# Patient Record
Sex: FEMALE | Race: WHITE | Employment: OTHER | URBAN - METROPOLITAN AREA
[De-identification: names, ages, dates, MRNs, and addresses within clinical notes are randomized per-mention and may not be internally consistent; named-entity substitution may affect disease eponyms.]

---

## 2017-03-08 ENCOUNTER — HOSPITAL ENCOUNTER (OUTPATIENT)
Age: 82
Setting detail: SPECIMEN
Discharge: HOME | End: 2017-03-08
Payer: MEDICARE

## 2017-03-08 LAB
ABSOLUTE EOS #: 0.1 K/UL (ref 0–0.4)
ABSOLUTE LYMPH #: 1.3 K/UL (ref 1–4.8)
ABSOLUTE MONO #: 0.5 K/UL (ref 0.1–1.2)
ANION GAP SERPL CALCULATED.3IONS-SCNC: 14 MMOL/L (ref 9–17)
BASOPHILS # BLD: 1 % (ref 0–2)
BASOPHILS ABSOLUTE: 0 K/UL (ref 0–0.2)
BUN BLDV-MCNC: 18 MG/DL (ref 8–23)
BUN/CREAT BLD: ABNORMAL (ref 9–20)
CALCIUM IONIZED: 1.38 MMOL/L (ref 1.13–1.33)
CALCIUM SERPL-MCNC: 10.2 MG/DL (ref 8.6–10.4)
CHLORIDE BLD-SCNC: 101 MMOL/L (ref 98–107)
CO2: 28 MMOL/L (ref 20–31)
CREAT SERPL-MCNC: 0.96 MG/DL (ref 0.5–0.9)
DIFFERENTIAL TYPE: ABNORMAL
EOSINOPHILS RELATIVE PERCENT: 4 % (ref 1–4)
GFR AFRICAN AMERICAN: >60 ML/MIN
GFR NON-AFRICAN AMERICAN: 55 ML/MIN
GFR SERPL CREATININE-BSD FRML MDRD: ABNORMAL ML/MIN/{1.73_M2}
GFR SERPL CREATININE-BSD FRML MDRD: ABNORMAL ML/MIN/{1.73_M2}
GLUCOSE BLD-MCNC: 90 MG/DL (ref 70–99)
HCT VFR BLD CALC: 40 % (ref 36–46)
HEMOGLOBIN: 13.4 G/DL (ref 12–16)
LYMPHOCYTES # BLD: 30 % (ref 24–44)
MAGNESIUM: 2.2 MG/DL (ref 1.6–2.6)
MCH RBC QN AUTO: 30 PG (ref 26–34)
MCHC RBC AUTO-ENTMCNC: 33.6 G/DL (ref 31–37)
MCV RBC AUTO: 89.4 FL (ref 80–100)
MONOCYTES # BLD: 12 % (ref 2–11)
PDW BLD-RTO: 14.4 % (ref 12.5–15.4)
PHOSPHORUS: 4.4 MG/DL (ref 2.6–4.5)
PLATELET # BLD: 193 K/UL (ref 140–450)
PLATELET ESTIMATE: ABNORMAL
PMV BLD AUTO: 9.5 FL (ref 6–12)
POTASSIUM SERPL-SCNC: 4.3 MMOL/L (ref 3.7–5.3)
RBC # BLD: 4.48 M/UL (ref 4–5.2)
RBC # BLD: ABNORMAL 10*6/UL
SEG NEUTROPHILS: 53 % (ref 36–66)
SEGMENTED NEUTROPHILS ABSOLUTE COUNT: 2.3 K/UL (ref 1.8–7.7)
SODIUM BLD-SCNC: 143 MMOL/L (ref 135–144)
T3 FREE: 2.91 PG/ML (ref 2.02–4.43)
THYROXINE, FREE: 1.31 NG/DL (ref 0.93–1.7)
TSH SERPL DL<=0.05 MIU/L-ACNC: 2.07 MIU/L (ref 0.3–5)
WBC # BLD: 4.3 K/UL (ref 3.5–11)
WBC # BLD: ABNORMAL 10*3/UL

## 2017-10-04 ENCOUNTER — OFFICE VISIT (OUTPATIENT)
Dept: FAMILY MEDICINE CLINIC | Age: 82
End: 2017-10-04
Payer: MEDICARE

## 2017-10-04 VITALS
WEIGHT: 157.6 LBS | BODY MASS INDEX: 29.76 KG/M2 | HEART RATE: 90 BPM | HEIGHT: 61 IN | SYSTOLIC BLOOD PRESSURE: 150 MMHG | DIASTOLIC BLOOD PRESSURE: 60 MMHG

## 2017-10-04 DIAGNOSIS — Z23 NEED FOR INFLUENZA VACCINATION: Primary | ICD-10-CM

## 2017-10-04 DIAGNOSIS — Z00.00 MEDICARE ANNUAL WELLNESS VISIT, SUBSEQUENT: ICD-10-CM

## 2017-10-04 DIAGNOSIS — R35.89 DIURESIS: ICD-10-CM

## 2017-10-04 DIAGNOSIS — I10 ESSENTIAL HYPERTENSION: ICD-10-CM

## 2017-10-04 DIAGNOSIS — Z23 NEED FOR PNEUMOCOCCAL VACCINATION: ICD-10-CM

## 2017-10-04 PROCEDURE — G0439 PPPS, SUBSEQ VISIT: HCPCS | Performed by: FAMILY MEDICINE

## 2017-10-04 PROCEDURE — 90670 PCV13 VACCINE IM: CPT | Performed by: FAMILY MEDICINE

## 2017-10-04 PROCEDURE — G0008 ADMIN INFLUENZA VIRUS VAC: HCPCS | Performed by: FAMILY MEDICINE

## 2017-10-04 PROCEDURE — G0009 ADMIN PNEUMOCOCCAL VACCINE: HCPCS | Performed by: FAMILY MEDICINE

## 2017-10-04 PROCEDURE — 90686 IIV4 VACC NO PRSV 0.5 ML IM: CPT | Performed by: FAMILY MEDICINE

## 2017-10-04 RX ORDER — CHLORTHALIDONE 25 MG/1
TABLET ORAL
Qty: 30 TABLET | Refills: 3 | Status: SHIPPED | OUTPATIENT
Start: 2017-10-04 | End: 2019-02-27 | Stop reason: SDUPTHER

## 2017-10-04 ASSESSMENT — ENCOUNTER SYMPTOMS
CONSTIPATION: 0
TROUBLE SWALLOWING: 0
VOMITING: 0
VOICE CHANGE: 0
ANAL BLEEDING: 0
SHORTNESS OF BREATH: 0
ABDOMINAL PAIN: 0
COUGH: 0
BACK PAIN: 0
NAUSEA: 0
EYE PAIN: 0
ABDOMINAL DISTENTION: 0
RECTAL PAIN: 0
COLOR CHANGE: 0
EYE DISCHARGE: 0
DIARRHEA: 0
SINUS PRESSURE: 0
BLOOD IN STOOL: 0
CHEST TIGHTNESS: 0
EYE REDNESS: 0

## 2017-10-04 ASSESSMENT — PATIENT HEALTH QUESTIONNAIRE - PHQ9
SUM OF ALL RESPONSES TO PHQ QUESTIONS 1-9: 0
SUM OF ALL RESPONSES TO PHQ9 QUESTIONS 1 & 2: 0
1. LITTLE INTEREST OR PLEASURE IN DOING THINGS: 0
2. FEELING DOWN, DEPRESSED OR HOPELESS: 0

## 2017-10-04 NOTE — MR AVS SNAPSHOT
After Visit Summary             [facility]   10/4/2017 10:15 AM   Office Visit    Description:  Female : 1929   Provider:  Rqauel Malloy MD   Department:  [facility]              Your Follow-Up and Future Appointments         Below is a list of your follow-up and future appointments. This may not be a complete list as you may have made appointments directly with providers that we are not aware of or your providers may have made some for you. Please call your providers to confirm appointments. It is important to keep your appointments. Please bring your current insurance card, photo ID, co-pay, and all medication bottles to your appointment. If self-pay, payment is expected at the time of service. Your To-Do List     Future Appointments Provider Department Dept Phone    10/18/2017 10:30 AM Raquel Malloy MD [address] [place] [address] [phone]    Please arrive 15 minutes prior to appointment time, bring insurance card and photo ID. Future Orders Complete By Expires    Basic Metabolic Panel [FCC68 Custom]  2018 10/4/2018    Follow-Up    Return in about 2 weeks (around 10/18/2017). Information from Your Visit        Department     Name Address Phone Fax    [address] [phone]      You Were Seen for:         Comments    Need for influenza vaccination   [008088]         Vital Signs     Blood Pressure Pulse Height Weight Body Mass Index Smoking Status    150/60 90 5' 1\" (1.549 m) 157 lb 9.6 oz (71.5 kg) 29.78 kg/m2 Never Smoker      Additional Information about your Body Mass Index (BMI)           Your BMI as listed above is considered overweight (25.0-29.9). BMI is an estimate of body fat, calculated from your height and weight.   The higher your BMI, the greater your risk of heart disease, high blood pressure, type 2 diabetes, stroke, gallstones, arthritis, sleep apnea, and certain cancers. BMI is not perfect. It may overestimate body fat in athletes and people who are more muscular. If your body fat is high you can improve your BMI by decreasing your calorie intake and becoming more physically active. Learn more at: [email or web address]             Today's Medication Changes          These changes are accurate as of: 10/4/17 11:45 AM.  If you have any questions, ask your nurse or doctor.                START taking these medications           chlorthalidone 25 MG tablet   Commonly known as:  HYGROTON   Instructions:  1/2 a tab twice weekly on Thursday and Guzman mornings   Quantity:  30 tablet   Refills:  3   Started by:  Juan Pablo Warren MD            Where to Get Your Medications      [facility] [phone] - [phone]  [address] [facility] [address]     Phone:  [phone]     chlorthalidone 25 MG tablet               Your Current Medications Are              chlorthalidone (HYGROTON) 25 MG tablet 1/2 a tab twice weekly on Thursday and Guzman mornings    hydrALAZINE (APRESOLINE) 25 MG tablet TAKE 1 TABLET BY MOUTH EVERY 8 HOURS    darifenacin (ENABLEX) 7.5 MG extended release tablet TAKE 1 TABLET BY MOUTH EVERY DAY    donepezil (ARICEPT) 10 MG tablet TAKE 1 TABLET BY MOUTH NIGHTLY    memantine (NAMENDA) 10 MG tablet Take 1 tablet by mouth 2 times daily    Lactobacillus (ACIDOPHILUS) CAPS capsule Take 2 capsules by mouth daily    vitamin D3 (CHOLECALCIFEROL) 400 UNITS TABS tablet Take 400 Units by mouth daily    nitroGLYCERIN (NITROSTAT) 0.4 MG SL tablet Place 1 tablet under the tongue as needed for Chest pain    ezetimibe (ZETIA) 10 MG tablet Take 1 tablet by mouth daily    Multiple Vitamins-Minerals (CENTRUM SILVER) TABS Take 1 tablet by mouth daily omeprazole (PRILOSEC) 20 MG capsule Take 1 capsule by mouth 2 times daily    aspirin 81 MG tablet Take 1 tablet by mouth daily    calcium carbonate 600 MG TABS tablet Take 1 tablet by mouth daily     latanoprost (XALATAN) 0.005 % ophthalmic solution Place 1 drop into both eyes nightly     ranolazine (RANEXA) 500 MG extended release tablet Take 500 mg by mouth daily      Allergies              Latex Itching    Actonel [Risedronate Sodium] Other (See Comments)    abdo pain    Adhesive Tape Other (See Comments)    band-aid  band-aid    Aminophylline Itching    Other reaction(s): Unknown    Amlodipine     Celebrex [Celecoxib] Itching    Other reaction(s): Unknown  Other reaction(s): Unknown    Chlorthalidone     Ciprofloxacin     Pain muscles    Codeine Nausea Only    Other reaction(s): Unknown  headache    Colesevelam Hcl     Darvocet A500 [Propoxyphene N-acetaminophen] Nausea Only    headache    Demerol Hcl [Meperidine] Itching    Other reaction(s): Unknown  Flash backs- and did not help pain  Other reaction(s): Unknown  Flash backs- and did not help pain    Lisinopril     Lovastatin     Macrobid [Nitrofurantoin Monohyd Macro] Hives    Metoprolol     Nitrofuran Derivatives     Nitrofurantoin Hives    Other reaction(s): Unknown    Propoxyphene Nausea Only    headache    Ranitidine     Ranitidine Hcl     pain    Simvastatin     Tramadol Hcl     Other reaction(s): Unknown    Tramadol Hcl     Other reaction(s): Unknown    Trimethadione     Trimethoprim     Ultram [Tramadol] Itching    Other reaction(s): Unknown    Vioxx [Rofecoxib]     Naproxen Rash, Itching    rash    Sulfa Antibiotics Rash, Itching    Other reaction(s): Unknown      We Ordered/Performed the following           INFLUENZA, QUADV, 3 YRS AND OLDER, IM, PF, PREFILL SYR OR SDV, 0.5ML (FLUZONE QUADV, PF)     Pneumococcal conjugate vaccine 13-valent          Additional Information        Basic Information Date Of Birth Sex Race Ethnicity Preferred Language    1/12/1929 Female White Non-/Non  English      Problem List as of 10/4/2017  Date Reviewed: 10/4/2017                Dizziness    SOB (shortness of breath) on exertion    Angina pectoris (HCC) (Chronic)    Urinary bladder disorder    History of recurrent UTI (urinary tract infection)    Essential hypertension    Vascular dementia without behavioral disturbance    Dyslipidemia    Unstable angina (HCC)    UTI (urinary tract infection)    Drug allergy, multiple    Acute coronary syndrome (HCC)    Abnormal cardiovascular stress test (Chronic)    Pulmonary hypertension due to mitral valve disease (Chronic)    Hx of mitral valve repair    S/P CABG x 2    Right HF (heart failure) secondary to left HF (heart failure)    Acute right-sided CHF (congestive heart failure) (Chronic)    Renal insufficiency    GERD (gastroesophageal reflux disease)    Urinary incontinence    Osteoporosis      Immunizations as of 10/4/2017     Name Date    Influenza, Intradermal, Preservative free 10/25/2013    Influenza, Trivalent Vaccine 3 Years and above, IM, PF 10/19/2016    Pneumococcal Polysaccharide ([id number]) 4/27/2015      Preventive Care        Date Due    Yearly Flu Vaccine (1) 9/1/2017    Tetanus Combination Vaccine (1 - Tdap) 11/30/2017 (Originally 1/12/1948)    Pneumococcal Vaccines (two) for all adults aged 72 and over (2 of 2 - PCV13) 11/30/2017 (Originally 4/27/2016)            [address] [facility] records indicate that you have declined MyChart signup.

## 2017-10-04 NOTE — PROGRESS NOTES
Subjective:      Patient ID: Ilir Parry is a 80 y.o. female. HPI Here for annual wellness visit. Has been doing ok for the last couple of months. Still feels Dizzy on and off, does not exercise much, no recent falls, sleep has been ok, mood hand sleep has been good as well. Cathetarizes her bladder BID- last seen dr William Bernardo about a month ago. Has not been taking the chlorthalidone she was using 12.5 mg TIW- for a while now. Has gained about 8 Lbs as well. Spoke with Dr Rob Babcock- will restart this for twice a week- as weight gain and HTN seems to be from Fluid retention. Review of Systems   Constitutional: Negative for activity change, appetite change and fatigue. HENT: Negative for dental problem, ear pain, hearing loss, postnasal drip, sinus pressure, sneezing, tinnitus, trouble swallowing and voice change. Eyes: Negative for pain, discharge, redness and visual disturbance. Respiratory: Negative for cough, chest tightness and shortness of breath. Cardiovascular: Negative for chest pain, palpitations and leg swelling. Gastrointestinal: Negative for abdominal distention, abdominal pain, anal bleeding, blood in stool, constipation, diarrhea, nausea, rectal pain and vomiting. Endocrine: Negative for cold intolerance, heat intolerance, polydipsia, polyphagia and polyuria. Genitourinary: Positive for difficulty urinating. Negative for decreased urine volume, dyspareunia, dysuria, enuresis, flank pain, frequency, genital sores, hematuria, menstrual problem, pelvic pain, urgency, vaginal bleeding and vaginal discharge. Musculoskeletal: Negative for arthralgias, back pain, gait problem, joint swelling, myalgias, neck pain and neck stiffness. Skin: Negative for color change, pallor and rash. Allergic/Immunologic: Negative for environmental allergies, food allergies and immunocompromised state. Neurological: Positive for dizziness.  Negative for tremors, seizures, syncope, facial asymmetry, speech difficulty, weakness, light-headedness, numbness and headaches. Hematological: Negative for adenopathy. Does not bruise/bleed easily. Psychiatric/Behavioral: Negative for agitation, behavioral problems, confusion, decreased concentration, sleep disturbance and suicidal ideas. The patient is not nervous/anxious. Objective:   Physical Exam   Constitutional: She is oriented to person, place, and time. She appears well-developed and well-nourished. HENT:   Head: Normocephalic and atraumatic. Right Ear: External ear normal.   Left Ear: External ear normal.   Nose: Nose normal.   Mouth/Throat: Oropharynx is clear and moist. No oropharyngeal exudate. Eyes: Conjunctivae and EOM are normal. Pupils are equal, round, and reactive to light. Right eye exhibits no discharge. Left eye exhibits no discharge. No scleral icterus. Neck: Normal range of motion. Neck supple. No JVD present. No tracheal deviation present. No thyromegaly present. Cardiovascular: Normal rate, regular rhythm, normal heart sounds and intact distal pulses. Exam reveals no gallop and no friction rub. No murmur heard. Pulmonary/Chest: Effort normal and breath sounds normal. No respiratory distress. She has no wheezes. She has no rales. She exhibits no tenderness. Abdominal: Soft. Bowel sounds are normal. She exhibits no distension and no mass. There is no tenderness. There is no rebound and no guarding. Musculoskeletal: Normal range of motion. She exhibits no edema or tenderness. Lymphadenopathy:     She has no cervical adenopathy. Neurological: She is alert and oriented to person, place, and time. She has normal reflexes. No cranial nerve deficit. Coordination normal.   Skin: Skin is warm and dry. No rash noted. Psychiatric: She has a normal mood and affect. Assessment:      1. Need for influenza vaccination  INFLUENZA, QUADV, 3 YRS AND OLDER, IM, PF, PREFILL SYR OR SDV, 0.5ML (FLUZONE QUADV, PF)   2.  Medicare annual wellness visit, subsequent     3. Need for pneumococcal vaccination  Pneumococcal conjugate vaccine 13-valent         Plan:      Orders Placed This Encounter   Procedures    INFLUENZA, QUADV, 3 YRS AND OLDER, IM, PF, PREFILL SYR OR SDV, 0.5ML (FLUZONE QUADV, PF)    Pneumococcal conjugate vaccine 13-valent       Outpatient Encounter Prescriptions as of 10/4/2017   Medication Sig Dispense Refill    chlorthalidone (HYGROTON) 25 MG tablet 1/2 a tab twice weekly on Thursday and Sunday mornings 30 tablet 3    hydrALAZINE (APRESOLINE) 25 MG tablet TAKE 1 TABLET BY MOUTH EVERY 8 HOURS 90 tablet 0    darifenacin (ENABLEX) 7.5 MG extended release tablet TAKE 1 TABLET BY MOUTH EVERY DAY  3    donepezil (ARICEPT) 10 MG tablet TAKE 1 TABLET BY MOUTH NIGHTLY 30 tablet 3    memantine (NAMENDA) 10 MG tablet Take 1 tablet by mouth 2 times daily 60 tablet 5    Lactobacillus (ACIDOPHILUS) CAPS capsule Take 2 capsules by mouth daily 60 capsule 2    vitamin D3 (CHOLECALCIFEROL) 400 UNITS TABS tablet Take 400 Units by mouth daily      nitroGLYCERIN (NITROSTAT) 0.4 MG SL tablet Place 1 tablet under the tongue as needed for Chest pain 25 tablet 3    ezetimibe (ZETIA) 10 MG tablet Take 1 tablet by mouth daily 30 tablet 3    Multiple Vitamins-Minerals (CENTRUM SILVER) TABS Take 1 tablet by mouth daily 30 tablet 3    omeprazole (PRILOSEC) 20 MG capsule Take 1 capsule by mouth 2 times daily (Patient taking differently: Take 20 mg by mouth Daily ) 180 capsule 1    aspirin 81 MG tablet Take 1 tablet by mouth daily 30 tablet 3    calcium carbonate 600 MG TABS tablet Take 1 tablet by mouth daily       latanoprost (XALATAN) 0.005 % ophthalmic solution Place 1 drop into both eyes nightly       ranolazine (RANEXA) 500 MG extended release tablet Take 500 mg by mouth daily      [DISCONTINUED] chlorthalidone (HYGROTON) 25 MG tablet Take 12.5 mg by mouth three times a week Takes Tues, Thurs, Sat.         No facility-administered

## 2017-10-16 ENCOUNTER — HOSPITAL ENCOUNTER (OUTPATIENT)
Age: 82
Setting detail: SPECIMEN
Discharge: HOME | End: 2017-10-16
Payer: MEDICARE

## 2017-10-16 DIAGNOSIS — R35.89 DIURESIS: ICD-10-CM

## 2017-10-16 DIAGNOSIS — I10 ESSENTIAL HYPERTENSION: ICD-10-CM

## 2017-10-16 LAB
ANION GAP SERPL CALCULATED.3IONS-SCNC: 10 MMOL/L (ref 9–17)
BUN BLDV-MCNC: 19 MG/DL (ref 8–23)
BUN/CREAT BLD: ABNORMAL (ref 9–20)
CALCIUM SERPL-MCNC: 9.2 MG/DL (ref 8.6–10.4)
CHLORIDE BLD-SCNC: 101 MMOL/L (ref 98–107)
CO2: 29 MMOL/L (ref 20–31)
CREAT SERPL-MCNC: 0.94 MG/DL (ref 0.5–0.9)
GFR AFRICAN AMERICAN: >60 ML/MIN
GFR NON-AFRICAN AMERICAN: 56 ML/MIN
GFR SERPL CREATININE-BSD FRML MDRD: ABNORMAL ML/MIN/{1.73_M2}
GFR SERPL CREATININE-BSD FRML MDRD: ABNORMAL ML/MIN/{1.73_M2}
GLUCOSE BLD-MCNC: 99 MG/DL (ref 70–99)
POTASSIUM SERPL-SCNC: 4.2 MMOL/L (ref 3.7–5.3)
SODIUM BLD-SCNC: 140 MMOL/L (ref 135–144)

## 2017-10-18 ENCOUNTER — OFFICE VISIT (OUTPATIENT)
Dept: FAMILY MEDICINE CLINIC | Age: 82
End: 2017-10-18
Payer: MEDICARE

## 2017-10-18 VITALS
SYSTOLIC BLOOD PRESSURE: 154 MMHG | WEIGHT: 156 LBS | DIASTOLIC BLOOD PRESSURE: 82 MMHG | HEIGHT: 61 IN | BODY MASS INDEX: 29.45 KG/M2 | HEART RATE: 99 BPM

## 2017-10-18 DIAGNOSIS — F01.50 VASCULAR DEMENTIA WITHOUT BEHAVIORAL DISTURBANCE (HCC): ICD-10-CM

## 2017-10-18 DIAGNOSIS — R42 DIZZINESS: ICD-10-CM

## 2017-10-18 DIAGNOSIS — N28.9 RENAL INSUFFICIENCY: ICD-10-CM

## 2017-10-18 DIAGNOSIS — R60.9 BODY FLUID RETENTION: ICD-10-CM

## 2017-10-18 DIAGNOSIS — I10 ESSENTIAL HYPERTENSION: Primary | ICD-10-CM

## 2017-10-18 PROCEDURE — 99213 OFFICE O/P EST LOW 20 MIN: CPT | Performed by: FAMILY MEDICINE

## 2017-10-18 ASSESSMENT — ENCOUNTER SYMPTOMS
CHEST TIGHTNESS: 0
VOICE CHANGE: 0
CONSTIPATION: 0
ABDOMINAL PAIN: 0
VOMITING: 0
COUGH: 0
ANAL BLEEDING: 0
BACK PAIN: 0
EYE DISCHARGE: 0
BLOOD IN STOOL: 0
TROUBLE SWALLOWING: 0
SINUS PRESSURE: 0
NAUSEA: 0
COLOR CHANGE: 0
EYE PAIN: 0
ABDOMINAL DISTENTION: 0
SHORTNESS OF BREATH: 0
RECTAL PAIN: 0
DIARRHEA: 0
EYE REDNESS: 0

## 2017-10-18 ASSESSMENT — PATIENT HEALTH QUESTIONNAIRE - PHQ9
2. FEELING DOWN, DEPRESSED OR HOPELESS: 0
SUM OF ALL RESPONSES TO PHQ QUESTIONS 1-9: 0
1. LITTLE INTEREST OR PLEASURE IN DOING THINGS: 0
SUM OF ALL RESPONSES TO PHQ9 QUESTIONS 1 & 2: 0

## 2017-10-18 NOTE — PROGRESS NOTES
Subjective:      Patient ID: Clint Reddy is a 80 y.o. female. HPI Recent weight gain that has not improved after starting chlorthalidone 1/2 a tab twice a week as advised by Dr Fay Navarro- he is running some tests to see why she is retaining fluid in November. States States sleep is ok, bowels and bladder ok, dizziness has improved from her past sx. Appetite has been good, mood ok as well. Review of Systems   Constitutional: Positive for unexpected weight change. Negative for activity change, appetite change and fatigue. HENT: Negative for dental problem, ear pain, hearing loss, postnasal drip, sinus pressure, sneezing, tinnitus, trouble swallowing and voice change. Eyes: Negative for pain, discharge, redness and visual disturbance. Respiratory: Negative for cough, chest tightness and shortness of breath. Cardiovascular: Negative for chest pain, palpitations and leg swelling. Gastrointestinal: Negative for abdominal distention, abdominal pain, anal bleeding, blood in stool, constipation, diarrhea, nausea, rectal pain and vomiting. Endocrine: Negative for cold intolerance, heat intolerance, polydipsia, polyphagia and polyuria. Genitourinary: Negative for decreased urine volume, difficulty urinating, dyspareunia, dysuria, enuresis, flank pain, frequency, genital sores, hematuria, menstrual problem, pelvic pain, urgency, vaginal bleeding and vaginal discharge. Musculoskeletal: Negative for arthralgias, back pain, gait problem, joint swelling, myalgias, neck pain and neck stiffness. Skin: Negative for color change, pallor and rash. Allergic/Immunologic: Negative for environmental allergies, food allergies and immunocompromised state. Neurological: Positive for dizziness. Negative for tremors, seizures, syncope, facial asymmetry, speech difficulty, weakness, light-headedness, numbness and headaches. Hematological: Negative for adenopathy. Does not bruise/bleed easily. Psychiatric/Behavioral: Negative for agitation, behavioral problems, confusion, decreased concentration, sleep disturbance and suicidal ideas. The patient is not nervous/anxious. Objective:   Physical Exam   Constitutional: She is oriented to person, place, and time. She appears well-developed and well-nourished. HENT:   Head: Normocephalic and atraumatic. Right Ear: External ear normal.   Left Ear: External ear normal.   Nose: Nose normal.   Mouth/Throat: Oropharynx is clear and moist. No oropharyngeal exudate. Eyes: Conjunctivae and EOM are normal. Pupils are equal, round, and reactive to light. Right eye exhibits no discharge. Left eye exhibits no discharge. No scleral icterus. Neck: Normal range of motion. Neck supple. No JVD present. No tracheal deviation present. No thyromegaly present. Cardiovascular: Normal rate, regular rhythm, normal heart sounds and intact distal pulses. Exam reveals no gallop and no friction rub. No murmur heard. Pulmonary/Chest: Effort normal and breath sounds normal. No respiratory distress. She has no wheezes. She has no rales. She exhibits no tenderness. Abdominal: Soft. Bowel sounds are normal. She exhibits no distension and no mass. There is no tenderness. There is no rebound and no guarding. Musculoskeletal: Normal range of motion. She exhibits no edema or tenderness. Lymphadenopathy:     She has no cervical adenopathy. Neurological: She is alert and oriented to person, place, and time. She has normal reflexes. No cranial nerve deficit. Coordination normal.   Skin: Skin is warm and dry. No rash noted. Psychiatric: She has a normal mood and affect. Assessment:      1. Essential hypertension     2. Vascular dementia without behavioral disturbance     3. Dizziness     4. Renal insufficiency     5. Body fluid retention           Plan:      No orders of the defined types were placed in this encounter.       Outpatient Encounter Prescriptions as of 10/18/2017   Medication Sig Dispense Refill    chlorthalidone (HYGROTON) 25 MG tablet 1/2 a tab twice weekly on Thursday and Sunday mornings 30 tablet 3    hydrALAZINE (APRESOLINE) 25 MG tablet TAKE 1 TABLET BY MOUTH EVERY 8 HOURS 90 tablet 0    darifenacin (ENABLEX) 7.5 MG extended release tablet TAKE 1 TABLET BY MOUTH EVERY DAY  3    donepezil (ARICEPT) 10 MG tablet TAKE 1 TABLET BY MOUTH NIGHTLY 30 tablet 3    ranolazine (RANEXA) 500 MG extended release tablet Take 500 mg by mouth daily      memantine (NAMENDA) 10 MG tablet Take 1 tablet by mouth 2 times daily 60 tablet 5    Lactobacillus (ACIDOPHILUS) CAPS capsule Take 2 capsules by mouth daily 60 capsule 2    vitamin D3 (CHOLECALCIFEROL) 400 UNITS TABS tablet Take 400 Units by mouth daily      nitroGLYCERIN (NITROSTAT) 0.4 MG SL tablet Place 1 tablet under the tongue as needed for Chest pain 25 tablet 3    ezetimibe (ZETIA) 10 MG tablet Take 1 tablet by mouth daily 30 tablet 3    Multiple Vitamins-Minerals (CENTRUM SILVER) TABS Take 1 tablet by mouth daily 30 tablet 3    omeprazole (PRILOSEC) 20 MG capsule Take 1 capsule by mouth 2 times daily (Patient taking differently: Take 20 mg by mouth Daily ) 180 capsule 1    aspirin 81 MG tablet Take 1 tablet by mouth daily 30 tablet 3    calcium carbonate 600 MG TABS tablet Take 1 tablet by mouth daily       latanoprost (XALATAN) 0.005 % ophthalmic solution Place 1 drop into both eyes nightly        No facility-administered encounter medications on file as of 10/18/2017.

## 2017-10-26 DIAGNOSIS — F01.50 VASCULAR DEMENTIA WITHOUT BEHAVIORAL DISTURBANCE (HCC): ICD-10-CM

## 2017-10-26 RX ORDER — DONEPEZIL HYDROCHLORIDE 10 MG/1
TABLET, FILM COATED ORAL
Qty: 30 TABLET | Refills: 3 | Status: SHIPPED | OUTPATIENT
Start: 2017-10-26 | End: 2018-03-21 | Stop reason: SDUPTHER

## 2017-10-26 NOTE — TELEPHONE ENCOUNTER
Gissel Renee is calling to request a refill on the following medication(s):  Requested Prescriptions     Pending Prescriptions Disp Refills    donepezil (ARICEPT) 10 MG tablet [Pharmacy Med Name: DONEPEZIL 10MG] 30 tablet 3     Sig: TAKE 1 TABLET BY MOUTH NIGHTLY       Last Visit Date (If Applicable):  83/97/7900    Next Visit Date:    Visit date not found

## 2017-11-18 DIAGNOSIS — I10 HYPERTENSION, UNSPECIFIED TYPE: ICD-10-CM

## 2017-11-20 RX ORDER — HYDRALAZINE HYDROCHLORIDE 25 MG/1
TABLET, FILM COATED ORAL
Qty: 90 TABLET | Refills: 0 | Status: SHIPPED | OUTPATIENT
Start: 2017-11-20 | End: 2018-01-27 | Stop reason: SDUPTHER

## 2017-11-20 NOTE — TELEPHONE ENCOUNTER
Bradford Leanne is calling to request a refill on the following medication(s):  Requested Prescriptions     Pending Prescriptions Disp Refills    hydrALAZINE (APRESOLINE) 25 MG tablet [Pharmacy Med Name: HYDRALAZINE 25MG] 90 tablet 0     Sig: TAKE 1 TABLET BY MOUTH EVERY 8 HOURS       Last Visit Date (If Applicable):  96/23/2666    Next Visit Date:    Visit date not found

## 2017-12-27 ENCOUNTER — OFFICE VISIT (OUTPATIENT)
Dept: FAMILY MEDICINE CLINIC | Age: 82
End: 2017-12-27
Payer: MEDICARE

## 2017-12-27 VITALS
SYSTOLIC BLOOD PRESSURE: 152 MMHG | WEIGHT: 161.4 LBS | HEART RATE: 119 BPM | DIASTOLIC BLOOD PRESSURE: 93 MMHG | HEIGHT: 61 IN | BODY MASS INDEX: 30.47 KG/M2

## 2017-12-27 DIAGNOSIS — I10 ESSENTIAL HYPERTENSION: Primary | ICD-10-CM

## 2017-12-27 DIAGNOSIS — K21.9 GASTROESOPHAGEAL REFLUX DISEASE, ESOPHAGITIS PRESENCE NOT SPECIFIED: ICD-10-CM

## 2017-12-27 DIAGNOSIS — E78.5 DYSLIPIDEMIA: ICD-10-CM

## 2017-12-27 DIAGNOSIS — Z95.1 S/P CABG X 2: ICD-10-CM

## 2017-12-27 DIAGNOSIS — R32 URINARY INCONTINENCE, UNSPECIFIED TYPE: ICD-10-CM

## 2017-12-27 DIAGNOSIS — F01.50 VASCULAR DEMENTIA WITHOUT BEHAVIORAL DISTURBANCE (HCC): ICD-10-CM

## 2017-12-27 DIAGNOSIS — I05.9 PULMONARY HYPERTENSION DUE TO MITRAL VALVE DISEASE (HCC): Chronic | ICD-10-CM

## 2017-12-27 DIAGNOSIS — R00.0 TACHYCARDIA: ICD-10-CM

## 2017-12-27 DIAGNOSIS — Z98.890 HX OF MITRAL VALVE REPAIR: ICD-10-CM

## 2017-12-27 DIAGNOSIS — I27.22 PULMONARY HYPERTENSION DUE TO MITRAL VALVE DISEASE (HCC): Chronic | ICD-10-CM

## 2017-12-27 PROCEDURE — 99213 OFFICE O/P EST LOW 20 MIN: CPT | Performed by: FAMILY MEDICINE

## 2017-12-27 ASSESSMENT — ENCOUNTER SYMPTOMS
ANAL BLEEDING: 0
BLOOD IN STOOL: 0
NAUSEA: 0
CONSTIPATION: 0
VOMITING: 0
EYE PAIN: 0
EYE DISCHARGE: 0
CHEST TIGHTNESS: 0
RECTAL PAIN: 0
TROUBLE SWALLOWING: 0
COLOR CHANGE: 0
COUGH: 0
BACK PAIN: 0
SHORTNESS OF BREATH: 0
EYE REDNESS: 0
ABDOMINAL PAIN: 0
SINUS PRESSURE: 0
DIARRHEA: 0
ABDOMINAL DISTENTION: 0
VOICE CHANGE: 0

## 2017-12-27 ASSESSMENT — PATIENT HEALTH QUESTIONNAIRE - PHQ9
2. FEELING DOWN, DEPRESSED OR HOPELESS: 0
SUM OF ALL RESPONSES TO PHQ9 QUESTIONS 1 & 2: 0
1. LITTLE INTEREST OR PLEASURE IN DOING THINGS: 0
SUM OF ALL RESPONSES TO PHQ QUESTIONS 1-9: 0

## 2017-12-27 NOTE — PROGRESS NOTES
Subjective:      Patient ID: Issac Shoemaker is a 80 y.o. female. HPI States here for follow up of hr dizziness as well as HTN. States SOB at times with exertion but not too much recently. Has no Chest pains or palpitations. Has been taking her meds regularly as advised by Dr Ramesh Medrano, he is concerned about fluid retention but patient states difficult to cut the water pill in half and sometimes only gets powder after trying to cut pill in half. Today she denies any mew sx or any worsening of her chronic sx as well. Sleep, appetite, bowels are ok, cath BID her bladder and no recent UTI . States memory has been stable. Sleep, mood, appetite and bowels are good. No recent chest pains or anginal sx as well as SOB any more than her normal.  No URI sx  Or UTI sx. Review of Systems   Constitutional: Negative for activity change, appetite change and fatigue. HENT: Negative for dental problem, ear pain, hearing loss, postnasal drip, sinus pressure, sneezing, tinnitus, trouble swallowing and voice change. Eyes: Negative for pain, discharge, redness and visual disturbance. Respiratory: Negative for cough, chest tightness and shortness of breath. Cardiovascular: Negative for chest pain, palpitations and leg swelling. Gastrointestinal: Negative for abdominal distention, abdominal pain, anal bleeding, blood in stool, constipation, diarrhea, nausea, rectal pain and vomiting. Endocrine: Negative for cold intolerance, heat intolerance, polydipsia, polyphagia and polyuria. Genitourinary: Negative for decreased urine volume, difficulty urinating, dyspareunia, dysuria, enuresis, flank pain, frequency, genital sores, hematuria, menstrual problem, pelvic pain, urgency, vaginal bleeding and vaginal discharge. Musculoskeletal: Negative for arthralgias, back pain, gait problem, joint swelling, myalgias, neck pain and neck stiffness. Skin: Negative for color change, pallor and rash.    Allergic/Immunologic: No facility-administered encounter medications on file as of 12/27/2017. Dr PACK [facility] office close till new years- will schedule appt after the new year as she is asymptomatic at this time- advised to take the water pill as advised- pharmacy to cut the pills if patient has diff doing so.

## 2018-01-03 ENCOUNTER — HOSPITAL ENCOUNTER (OUTPATIENT)
Age: 83
Setting detail: SPECIMEN
Discharge: HOME | End: 2018-01-03
Payer: MEDICARE

## 2018-01-03 LAB
ANION GAP SERPL CALCULATED.3IONS-SCNC: 13 MMOL/L (ref 9–17)
BNP INTERPRETATION: NORMAL
BUN BLDV-MCNC: 17 MG/DL (ref 8–23)
BUN/CREAT BLD: ABNORMAL (ref 9–20)
CALCIUM SERPL-MCNC: 9.1 MG/DL (ref 8.6–10.4)
CHLORIDE BLD-SCNC: 99 MMOL/L (ref 98–107)
CO2: 29 MMOL/L (ref 20–31)
CREAT SERPL-MCNC: 0.93 MG/DL (ref 0.5–0.9)
GFR AFRICAN AMERICAN: >60 ML/MIN
GFR NON-AFRICAN AMERICAN: 57 ML/MIN
GFR SERPL CREATININE-BSD FRML MDRD: ABNORMAL ML/MIN/{1.73_M2}
GFR SERPL CREATININE-BSD FRML MDRD: ABNORMAL ML/MIN/{1.73_M2}
GLUCOSE BLD-MCNC: 95 MG/DL (ref 70–99)
POTASSIUM SERPL-SCNC: 4.2 MMOL/L (ref 3.7–5.3)
PRO-BNP: 271 PG/ML
SODIUM BLD-SCNC: 141 MMOL/L (ref 135–144)

## 2018-01-27 DIAGNOSIS — I10 HYPERTENSION, UNSPECIFIED TYPE: ICD-10-CM

## 2018-01-29 RX ORDER — HYDRALAZINE HYDROCHLORIDE 25 MG/1
TABLET, FILM COATED ORAL
Qty: 90 TABLET | Refills: 0 | Status: SHIPPED | OUTPATIENT
Start: 2018-01-29 | End: 2018-03-12 | Stop reason: SDUPTHER

## 2018-03-12 DIAGNOSIS — I10 HYPERTENSION, UNSPECIFIED TYPE: ICD-10-CM

## 2018-03-12 RX ORDER — HYDRALAZINE HYDROCHLORIDE 25 MG/1
TABLET, FILM COATED ORAL
Qty: 90 TABLET | Refills: 0 | Status: SHIPPED | OUTPATIENT
Start: 2018-03-12 | End: 2018-04-13 | Stop reason: SDUPTHER

## 2018-03-21 DIAGNOSIS — F01.50 VASCULAR DEMENTIA WITHOUT BEHAVIORAL DISTURBANCE (HCC): ICD-10-CM

## 2018-03-21 RX ORDER — DONEPEZIL HYDROCHLORIDE 10 MG/1
TABLET, FILM COATED ORAL
Qty: 30 TABLET | Refills: 3 | Status: SHIPPED | OUTPATIENT
Start: 2018-03-21 | End: 2018-08-09 | Stop reason: SDUPTHER

## 2018-03-29 ENCOUNTER — OFFICE VISIT (OUTPATIENT)
Dept: FAMILY MEDICINE CLINIC | Age: 83
End: 2018-03-29
Payer: MEDICARE

## 2018-03-29 VITALS
BODY MASS INDEX: 30.51 KG/M2 | DIASTOLIC BLOOD PRESSURE: 83 MMHG | SYSTOLIC BLOOD PRESSURE: 153 MMHG | HEART RATE: 85 BPM | HEIGHT: 61 IN | OXYGEN SATURATION: 97 % | WEIGHT: 161.6 LBS

## 2018-03-29 DIAGNOSIS — I10 ESSENTIAL HYPERTENSION: Primary | ICD-10-CM

## 2018-03-29 DIAGNOSIS — E53.9 VITAMIN B DEFICIENCY: ICD-10-CM

## 2018-03-29 DIAGNOSIS — R73.9 HYPERGLYCEMIA: ICD-10-CM

## 2018-03-29 DIAGNOSIS — R42 DIZZINESS: ICD-10-CM

## 2018-03-29 DIAGNOSIS — E55.9 VITAMIN D DEFICIENCY: ICD-10-CM

## 2018-03-29 DIAGNOSIS — Z13.29 SCREENING FOR THYROID DISORDER: ICD-10-CM

## 2018-03-29 DIAGNOSIS — K21.9 GASTROESOPHAGEAL REFLUX DISEASE, ESOPHAGITIS PRESENCE NOT SPECIFIED: ICD-10-CM

## 2018-03-29 DIAGNOSIS — N28.9 RENAL INSUFFICIENCY: ICD-10-CM

## 2018-03-29 DIAGNOSIS — R32 URINARY INCONTINENCE, UNSPECIFIED TYPE: ICD-10-CM

## 2018-03-29 DIAGNOSIS — E78.5 DYSLIPIDEMIA: ICD-10-CM

## 2018-03-29 PROCEDURE — 99214 OFFICE O/P EST MOD 30 MIN: CPT | Performed by: FAMILY MEDICINE

## 2018-03-29 ASSESSMENT — ENCOUNTER SYMPTOMS
CHEST TIGHTNESS: 0
EYE PAIN: 0
RECTAL PAIN: 0
SINUS PRESSURE: 0
ANAL BLEEDING: 0
CONSTIPATION: 0
VOMITING: 0
TROUBLE SWALLOWING: 0
NAUSEA: 0
BACK PAIN: 0
SHORTNESS OF BREATH: 0
ABDOMINAL DISTENTION: 0
VOICE CHANGE: 0
DIARRHEA: 0
COLOR CHANGE: 0
ABDOMINAL PAIN: 0
COUGH: 0
EYE REDNESS: 0
EYE DISCHARGE: 0
BLOOD IN STOOL: 0

## 2018-03-29 ASSESSMENT — PATIENT HEALTH QUESTIONNAIRE - PHQ9
1. LITTLE INTEREST OR PLEASURE IN DOING THINGS: 0
1. LITTLE INTEREST OR PLEASURE IN DOING THINGS: 0
SUM OF ALL RESPONSES TO PHQ9 QUESTIONS 1 & 2: 0
SUM OF ALL RESPONSES TO PHQ QUESTIONS 1-9: 0
2. FEELING DOWN, DEPRESSED OR HOPELESS: 0
SUM OF ALL RESPONSES TO PHQ QUESTIONS 1-9: 0
SUM OF ALL RESPONSES TO PHQ9 QUESTIONS 1 & 2: 0
2. FEELING DOWN, DEPRESSED OR HOPELESS: 0

## 2018-05-16 DIAGNOSIS — F03.90 DEMENTIA WITHOUT BEHAVIORAL DISTURBANCE, UNSPECIFIED DEMENTIA TYPE: ICD-10-CM

## 2018-05-16 RX ORDER — MEMANTINE HYDROCHLORIDE 10 MG/1
TABLET ORAL
Qty: 60 TABLET | Refills: 5 | Status: SHIPPED | OUTPATIENT
Start: 2018-05-16 | End: 2019-01-14 | Stop reason: SDUPTHER

## 2018-07-17 ENCOUNTER — HOSPITAL ENCOUNTER (OUTPATIENT)
Age: 83
Setting detail: SPECIMEN
Discharge: HOME | End: 2018-07-17
Payer: MEDICARE

## 2018-07-17 DIAGNOSIS — E55.9 VITAMIN D DEFICIENCY: ICD-10-CM

## 2018-07-17 DIAGNOSIS — E53.9 VITAMIN B DEFICIENCY: ICD-10-CM

## 2018-07-17 DIAGNOSIS — R73.9 HYPERGLYCEMIA: ICD-10-CM

## 2018-07-17 DIAGNOSIS — Z13.29 SCREENING FOR THYROID DISORDER: ICD-10-CM

## 2018-07-17 DIAGNOSIS — E78.5 DYSLIPIDEMIA: ICD-10-CM

## 2018-07-17 LAB
ALBUMIN SERPL-MCNC: 4.4 G/DL (ref 3.5–5.2)
ALBUMIN/GLOBULIN RATIO: 1.7 (ref 1–2.5)
ALP BLD-CCNC: 53 U/L (ref 35–104)
ALT SERPL-CCNC: 16 U/L (ref 5–33)
ANION GAP SERPL CALCULATED.3IONS-SCNC: 14 MMOL/L (ref 9–17)
AST SERPL-CCNC: 19 U/L
BILIRUB SERPL-MCNC: 0.5 MG/DL (ref 0.3–1.2)
BUN BLDV-MCNC: 16 MG/DL (ref 8–23)
BUN/CREAT BLD: ABNORMAL (ref 9–20)
CALCIUM SERPL-MCNC: 9.4 MG/DL (ref 8.6–10.4)
CHLORIDE BLD-SCNC: 102 MMOL/L (ref 98–107)
CHOLESTEROL/HDL RATIO: 3.8
CHOLESTEROL: 183 MG/DL
CO2: 25 MMOL/L (ref 20–31)
CREAT SERPL-MCNC: 0.94 MG/DL (ref 0.5–0.9)
ESTIMATED AVERAGE GLUCOSE: 108 MG/DL
FOLATE: >20 NG/ML
GFR AFRICAN AMERICAN: >60 ML/MIN
GFR NON-AFRICAN AMERICAN: 56 ML/MIN
GFR SERPL CREATININE-BSD FRML MDRD: ABNORMAL ML/MIN/{1.73_M2}
GFR SERPL CREATININE-BSD FRML MDRD: ABNORMAL ML/MIN/{1.73_M2}
GLUCOSE BLD-MCNC: 94 MG/DL (ref 70–99)
HBA1C MFR BLD: 5.4 % (ref 4–6)
HCT VFR BLD CALC: 43.8 % (ref 36.3–47.1)
HDLC SERPL-MCNC: 48 MG/DL
HEMOGLOBIN: 14 G/DL (ref 11.9–15.1)
LDL CHOLESTEROL: 107 MG/DL (ref 0–130)
MCH RBC QN AUTO: 30.9 PG (ref 25.2–33.5)
MCHC RBC AUTO-ENTMCNC: 32 G/DL (ref 28.4–34.8)
MCV RBC AUTO: 96.7 FL (ref 82.6–102.9)
NRBC AUTOMATED: 0 PER 100 WBC
PDW BLD-RTO: 12.8 % (ref 11.8–14.4)
PLATELET # BLD: 180 K/UL (ref 138–453)
PMV BLD AUTO: 11.6 FL (ref 8.1–13.5)
POTASSIUM SERPL-SCNC: 4 MMOL/L (ref 3.7–5.3)
RBC # BLD: 4.53 M/UL (ref 3.95–5.11)
SODIUM BLD-SCNC: 141 MMOL/L (ref 135–144)
T3 FREE: 3.18 PG/ML (ref 2.02–4.43)
THYROXINE, FREE: 1.52 NG/DL (ref 0.93–1.7)
TOTAL PROTEIN: 7 G/DL (ref 6.4–8.3)
TRIGL SERPL-MCNC: 140 MG/DL
TSH SERPL DL<=0.05 MIU/L-ACNC: 3.29 MIU/L (ref 0.3–5)
VITAMIN B-12: 1181 PG/ML (ref 232–1245)
VLDLC SERPL CALC-MCNC: NORMAL MG/DL (ref 1–30)
WBC # BLD: 5.1 K/UL (ref 3.5–11.3)

## 2018-07-18 LAB — VITAMIN D 25-HYDROXY: 40.4 NG/ML (ref 30–100)

## 2018-07-23 ENCOUNTER — OFFICE VISIT (OUTPATIENT)
Dept: FAMILY MEDICINE CLINIC | Age: 83
End: 2018-07-23
Payer: MEDICARE

## 2018-07-23 VITALS
HEIGHT: 61 IN | OXYGEN SATURATION: 96 % | BODY MASS INDEX: 30.4 KG/M2 | SYSTOLIC BLOOD PRESSURE: 125 MMHG | WEIGHT: 161 LBS | DIASTOLIC BLOOD PRESSURE: 68 MMHG | HEART RATE: 86 BPM

## 2018-07-23 DIAGNOSIS — M81.0 OSTEOPOROSIS WITHOUT CURRENT PATHOLOGICAL FRACTURE, UNSPECIFIED OSTEOPOROSIS TYPE: ICD-10-CM

## 2018-07-23 DIAGNOSIS — Z95.1 S/P CABG X 2: ICD-10-CM

## 2018-07-23 DIAGNOSIS — I10 ESSENTIAL HYPERTENSION: ICD-10-CM

## 2018-07-23 DIAGNOSIS — E78.5 DYSLIPIDEMIA: ICD-10-CM

## 2018-07-23 DIAGNOSIS — N28.9 RENAL INSUFFICIENCY: ICD-10-CM

## 2018-07-23 DIAGNOSIS — K21.9 GASTROESOPHAGEAL REFLUX DISEASE, ESOPHAGITIS PRESENCE NOT SPECIFIED: ICD-10-CM

## 2018-07-23 DIAGNOSIS — Z23 NEED FOR SHINGLES VACCINE: Primary | ICD-10-CM

## 2018-07-23 DIAGNOSIS — Z98.890 HX OF MITRAL VALVE REPAIR: ICD-10-CM

## 2018-07-23 DIAGNOSIS — R33.9 URINARY RETENTION: ICD-10-CM

## 2018-07-23 DIAGNOSIS — F01.50 VASCULAR DEMENTIA WITHOUT BEHAVIORAL DISTURBANCE (HCC): ICD-10-CM

## 2018-07-23 PROCEDURE — 99213 OFFICE O/P EST LOW 20 MIN: CPT | Performed by: FAMILY MEDICINE

## 2018-07-23 ASSESSMENT — ENCOUNTER SYMPTOMS
BACK PAIN: 0
VOICE CHANGE: 0
VOMITING: 0
ANAL BLEEDING: 0
EYE PAIN: 0
CHEST TIGHTNESS: 0
EYE REDNESS: 0
SHORTNESS OF BREATH: 0
CONSTIPATION: 0
SINUS PRESSURE: 0
COLOR CHANGE: 0
NAUSEA: 0
ABDOMINAL PAIN: 0
ABDOMINAL DISTENTION: 0
DIARRHEA: 0
COUGH: 0
EYE DISCHARGE: 0
RECTAL PAIN: 0
TROUBLE SWALLOWING: 0
BLOOD IN STOOL: 0

## 2018-07-23 ASSESSMENT — PATIENT HEALTH QUESTIONNAIRE - PHQ9
1. LITTLE INTEREST OR PLEASURE IN DOING THINGS: 0
SUM OF ALL RESPONSES TO PHQ9 QUESTIONS 1 & 2: 0
SUM OF ALL RESPONSES TO PHQ QUESTIONS 1-9: 0
2. FEELING DOWN, DEPRESSED OR HOPELESS: 0

## 2018-07-23 NOTE — PROGRESS NOTES
Subjective:      Patient ID: Sudhir Lindsay is a 80 y.o. female. HPI Here for a recheck. States has been doing a lot better recently. Active at times around the house. Walks to the mail box and back. States mood, sleep, appetite, bowels are all ok. Continues to cath twice a day. No Recent UTI. No Sx of dizziness as well. BP has been stable. Lbs done recently reviewed with patient- labs look great. Review of Systems   Constitutional: Negative for activity change, appetite change and fatigue. HENT: Negative for dental problem, ear pain, hearing loss, postnasal drip, sinus pressure, sneezing, tinnitus, trouble swallowing and voice change. Eyes: Negative for pain, discharge, redness and visual disturbance. Respiratory: Negative for cough, chest tightness and shortness of breath. Cardiovascular: Negative for chest pain, palpitations and leg swelling. Gastrointestinal: Negative for abdominal distention, abdominal pain, anal bleeding, blood in stool, constipation, diarrhea, nausea, rectal pain and vomiting. Endocrine: Negative for cold intolerance, heat intolerance, polydipsia, polyphagia and polyuria. Genitourinary: Negative for decreased urine volume, difficulty urinating, dyspareunia, dysuria, enuresis, flank pain, frequency, genital sores, hematuria, menstrual problem, pelvic pain, urgency, vaginal bleeding and vaginal discharge. Musculoskeletal: Negative for arthralgias, back pain, gait problem, joint swelling, myalgias, neck pain and neck stiffness. Skin: Negative for color change, pallor and rash. Allergic/Immunologic: Negative for environmental allergies, food allergies and immunocompromised state. Neurological: Negative for dizziness, tremors, seizures, syncope, facial asymmetry, speech difficulty, weakness, light-headedness, numbness and headaches. Hematological: Negative for adenopathy. Does not bruise/bleed easily.    Psychiatric/Behavioral: Negative for agitation, behavioral

## 2018-08-09 DIAGNOSIS — I10 HYPERTENSION, UNSPECIFIED TYPE: ICD-10-CM

## 2018-08-09 DIAGNOSIS — F01.50 VASCULAR DEMENTIA WITHOUT BEHAVIORAL DISTURBANCE (HCC): ICD-10-CM

## 2018-08-10 RX ORDER — HYDRALAZINE HYDROCHLORIDE 25 MG/1
25 TABLET, FILM COATED ORAL 3 TIMES DAILY PRN
Qty: 90 TABLET | Refills: 0 | Status: SHIPPED | OUTPATIENT
Start: 2018-08-10 | End: 2018-10-01 | Stop reason: SDUPTHER

## 2018-08-10 RX ORDER — DONEPEZIL HYDROCHLORIDE 10 MG/1
TABLET, FILM COATED ORAL
Qty: 30 TABLET | Refills: 3 | Status: SHIPPED | OUTPATIENT
Start: 2018-08-10 | End: 2018-12-31 | Stop reason: SDUPTHER

## 2018-10-01 DIAGNOSIS — I10 HYPERTENSION, UNSPECIFIED TYPE: ICD-10-CM

## 2018-10-01 RX ORDER — HYDRALAZINE HYDROCHLORIDE 25 MG/1
25 TABLET, FILM COATED ORAL 3 TIMES DAILY PRN
Qty: 90 TABLET | Refills: 0 | Status: SHIPPED | OUTPATIENT
Start: 2018-10-01 | End: 2018-12-31 | Stop reason: SDUPTHER

## 2018-11-26 ENCOUNTER — HOSPITAL ENCOUNTER (OUTPATIENT)
Age: 83
Setting detail: SPECIMEN
Discharge: HOME | End: 2018-11-26
Payer: MEDICARE

## 2018-11-26 ENCOUNTER — OFFICE VISIT (OUTPATIENT)
Dept: FAMILY MEDICINE CLINIC | Age: 83
End: 2018-11-26
Payer: MEDICARE

## 2018-11-26 VITALS
OXYGEN SATURATION: 95 % | HEART RATE: 88 BPM | WEIGHT: 156 LBS | HEIGHT: 61 IN | DIASTOLIC BLOOD PRESSURE: 80 MMHG | SYSTOLIC BLOOD PRESSURE: 159 MMHG | BODY MASS INDEX: 29.45 KG/M2

## 2018-11-26 DIAGNOSIS — R35.0 URINARY FREQUENCY: ICD-10-CM

## 2018-11-26 DIAGNOSIS — R31.9 URINARY TRACT INFECTION WITH HEMATURIA, SITE UNSPECIFIED: ICD-10-CM

## 2018-11-26 DIAGNOSIS — N28.9 RENAL INSUFFICIENCY: ICD-10-CM

## 2018-11-26 DIAGNOSIS — N39.0 URINARY TRACT INFECTION WITH HEMATURIA, SITE UNSPECIFIED: ICD-10-CM

## 2018-11-26 DIAGNOSIS — E78.5 DYSLIPIDEMIA: ICD-10-CM

## 2018-11-26 DIAGNOSIS — F01.50 VASCULAR DEMENTIA WITHOUT BEHAVIORAL DISTURBANCE (HCC): ICD-10-CM

## 2018-11-26 DIAGNOSIS — I10 ESSENTIAL HYPERTENSION: ICD-10-CM

## 2018-11-26 DIAGNOSIS — Z23 NEED FOR INFLUENZA VACCINATION: ICD-10-CM

## 2018-11-26 DIAGNOSIS — Z00.00 ROUTINE GENERAL MEDICAL EXAMINATION AT A HEALTH CARE FACILITY: Primary | ICD-10-CM

## 2018-11-26 DIAGNOSIS — K21.9 GASTROESOPHAGEAL REFLUX DISEASE, ESOPHAGITIS PRESENCE NOT SPECIFIED: ICD-10-CM

## 2018-11-26 DIAGNOSIS — Z95.1 S/P CABG X 2: ICD-10-CM

## 2018-11-26 LAB
-: ABNORMAL
AMORPHOUS: ABNORMAL
BACTERIA: ABNORMAL
BILIRUBIN URINE: NEGATIVE
BILIRUBIN, POC: NORMAL
BLOOD URINE, POC: NORMAL
CASTS UA: ABNORMAL /LPF (ref 0–2)
CLARITY, POC: NORMAL
COLOR, POC: YELLOW
COLOR: YELLOW
COMMENT UA: ABNORMAL
CRYSTALS, UA: ABNORMAL /HPF
EPITHELIAL CELLS UA: ABNORMAL /HPF (ref 0–5)
GLUCOSE URINE, POC: NORMAL
GLUCOSE URINE: NEGATIVE
KETONES, POC: NORMAL
KETONES, URINE: NEGATIVE
LEUKOCYTE EST, POC: NORMAL
LEUKOCYTE ESTERASE, URINE: ABNORMAL
MUCUS: ABNORMAL
NITRITE, POC: NORMAL
NITRITE, URINE: NEGATIVE
OTHER OBSERVATIONS UA: ABNORMAL
PH UA: 7.5 (ref 5–8)
PH, POC: 7
PROTEIN UA: ABNORMAL
PROTEIN, POC: + 30
RBC UA: ABNORMAL /HPF (ref 0–2)
RENAL EPITHELIAL, UA: ABNORMAL /HPF
SPECIFIC GRAVITY UA: 1.01 (ref 1–1.03)
SPECIFIC GRAVITY, POC: 1.01
TRICHOMONAS: ABNORMAL
TURBIDITY: ABNORMAL
URINE HGB: ABNORMAL
UROBILINOGEN, POC: NORMAL
UROBILINOGEN, URINE: NORMAL
WBC UA: ABNORMAL /HPF (ref 0–5)
YEAST: ABNORMAL

## 2018-11-26 PROCEDURE — G0439 PPPS, SUBSEQ VISIT: HCPCS | Performed by: FAMILY MEDICINE

## 2018-11-26 PROCEDURE — 99213 OFFICE O/P EST LOW 20 MIN: CPT | Performed by: FAMILY MEDICINE

## 2018-11-26 PROCEDURE — G0008 ADMIN INFLUENZA VIRUS VAC: HCPCS | Performed by: FAMILY MEDICINE

## 2018-11-26 PROCEDURE — 81003 URINALYSIS AUTO W/O SCOPE: CPT | Performed by: FAMILY MEDICINE

## 2018-11-26 PROCEDURE — 90662 IIV NO PRSV INCREASED AG IM: CPT | Performed by: FAMILY MEDICINE

## 2018-11-26 ASSESSMENT — LIFESTYLE VARIABLES: HOW OFTEN DO YOU HAVE A DRINK CONTAINING ALCOHOL: 0

## 2018-11-26 ASSESSMENT — ANXIETY QUESTIONNAIRES: GAD7 TOTAL SCORE: 0

## 2018-11-26 ASSESSMENT — PATIENT HEALTH QUESTIONNAIRE - PHQ9
SUM OF ALL RESPONSES TO PHQ QUESTIONS 1-9: 2
SUM OF ALL RESPONSES TO PHQ QUESTIONS 1-9: 2

## 2018-11-26 NOTE — PROGRESS NOTES
of your eyesight?: (!) Yes  Have you had an eye exam within the past year?: Yes  Hearing/Vision Interventions:  · Hearing concerns:  patient declines any further evaluation/treatment for hearing issues   · Forgets to put hearing aids   · Has a pair of glasses- recent eye appt with Dr Oriana Hernandez at [facility].     Safety:  Safety  Do you have working smoke detectors?: Yes  Have all throw rugs been removed or fastened?: (!) No  Do you have non-slip mats in all bathtubs?: Yes  Do all of your stairways have a railing or banister?: Yes  Are your doorways, halls and stairs free of clutter?: Yes  Do you always fasten your seatbelt when you are in a car?: Yes  Safety Interventions:  · Home safety tips provided    ADL:  ADLs  In the past 7 days, did you need help from others to perform any of the following everyday activities?: None (has a cane )  In the past 7 days, did you need help from others to take care of any of the following?: [employer], Housekeeping, [employer], Shopping, Food Preparation, Transportation  ADL Interventions:  · Patient declines any further evaluation/treatment for this issue  Has twice a month housekeeping from [facility] Maintenance Status  Immunization History   Administered Date(s) Administered    Influenza, Intradermal, Preservative free 10/25/2013    Influenza, Quadv, 3 yrs and older, IM, PF (Fluzone 3 yrs and older or Afluria 5 yrs and older) 10/04/2017    Influenza, Triv, 3 Years and older, IM, PF (Afluria 5yrs and older) 10/19/2016    Pneumococcal 13-valent Conjugate ([id number]) 10/04/2017    Pneumococcal Polysaccharide ([id number]) 04/27/2015        Health Maintenance   Topic Date Due    Shingles Vaccine (1 of 2 - 2 Dose Series) 01/12/1979    Flu vaccine (1) 09/01/2018    DTaP/Tdap/Td vaccine (1 - Tdap) 12/31/2018 (Originally 1/12/1948)    Potassium monitoring  07/17/2019    Creatinine monitoring  07/17/2019    Pneumococcal low/med risk  Completed     Recommendations for Preventive Services Due: see orders and patient instructions/AVS.  .   Recommended screening schedule for the next 5-10 years is provided to the patient in written form: see Patient Instructions/AVS.

## 2018-11-28 LAB
CULTURE: ABNORMAL
CULTURE: ABNORMAL
Lab: ABNORMAL
ORGANISM: ABNORMAL
ORGANISM: ABNORMAL
SPECIMEN DESCRIPTION: ABNORMAL
STATUS: ABNORMAL

## 2018-11-29 RX ORDER — CEPHALEXIN 250 MG/1
250 CAPSULE ORAL 3 TIMES DAILY
Qty: 30 CAPSULE | Refills: 0 | Status: SHIPPED | OUTPATIENT
Start: 2018-11-29 | End: 2018-12-11 | Stop reason: ALTCHOICE

## 2018-12-11 ENCOUNTER — HOSPITAL ENCOUNTER (OUTPATIENT)
Age: 83
Setting detail: SPECIMEN
Discharge: HOME | End: 2018-12-11
Payer: MEDICARE

## 2018-12-11 ENCOUNTER — OFFICE VISIT (OUTPATIENT)
Dept: FAMILY MEDICINE CLINIC | Age: 83
End: 2018-12-11
Payer: MEDICARE

## 2018-12-11 VITALS
HEART RATE: 81 BPM | SYSTOLIC BLOOD PRESSURE: 147 MMHG | BODY MASS INDEX: 29 KG/M2 | DIASTOLIC BLOOD PRESSURE: 73 MMHG | WEIGHT: 153.6 LBS | HEIGHT: 61 IN | OXYGEN SATURATION: 97 %

## 2018-12-11 DIAGNOSIS — R26.9 GAIT DIFFICULTY: ICD-10-CM

## 2018-12-11 DIAGNOSIS — R32 URINARY INCONTINENCE, UNSPECIFIED TYPE: ICD-10-CM

## 2018-12-11 DIAGNOSIS — N28.9 RENAL INSUFFICIENCY: ICD-10-CM

## 2018-12-11 DIAGNOSIS — N39.0 URINARY TRACT INFECTION WITHOUT HEMATURIA, SITE UNSPECIFIED: ICD-10-CM

## 2018-12-11 DIAGNOSIS — N39.0 URINARY TRACT INFECTION WITHOUT HEMATURIA, SITE UNSPECIFIED: Primary | ICD-10-CM

## 2018-12-11 DIAGNOSIS — R33.9 URINARY RETENTION: ICD-10-CM

## 2018-12-11 DIAGNOSIS — I10 ESSENTIAL HYPERTENSION: ICD-10-CM

## 2018-12-11 LAB
-: ABNORMAL
AMORPHOUS: ABNORMAL
BACTERIA: ABNORMAL
BILIRUBIN URINE: NEGATIVE
BILIRUBIN, POC: NORMAL
BLOOD URINE, POC: NORMAL
CASTS UA: ABNORMAL /LPF (ref 0–2)
CLARITY, POC: NORMAL
COLOR, POC: YELLOW
COLOR: YELLOW
COMMENT UA: ABNORMAL
CRYSTALS, UA: ABNORMAL /HPF
EPITHELIAL CELLS UA: ABNORMAL /HPF (ref 0–5)
GLUCOSE URINE, POC: NORMAL
GLUCOSE URINE: NEGATIVE
KETONES, POC: NORMAL
KETONES, URINE: NEGATIVE
LEUKOCYTE EST, POC: NORMAL
LEUKOCYTE ESTERASE, URINE: ABNORMAL
MUCUS: ABNORMAL
NITRITE, POC: NORMAL
NITRITE, URINE: NEGATIVE
OTHER OBSERVATIONS UA: ABNORMAL
PH UA: 8.5 (ref 5–8)
PH, POC: 7.5
PROTEIN UA: NEGATIVE
PROTEIN, POC: NORMAL
RBC UA: ABNORMAL /HPF (ref 0–2)
RENAL EPITHELIAL, UA: ABNORMAL /HPF
SPECIFIC GRAVITY UA: 1.02 (ref 1–1.03)
SPECIFIC GRAVITY, POC: 1
TRICHOMONAS: ABNORMAL
TURBIDITY: ABNORMAL
URINE HGB: NEGATIVE
UROBILINOGEN, POC: 0.2
UROBILINOGEN, URINE: NORMAL
WBC UA: ABNORMAL /HPF (ref 0–5)
YEAST: ABNORMAL

## 2018-12-11 PROCEDURE — 99213 OFFICE O/P EST LOW 20 MIN: CPT | Performed by: FAMILY MEDICINE

## 2018-12-11 PROCEDURE — 81003 URINALYSIS AUTO W/O SCOPE: CPT | Performed by: FAMILY MEDICINE

## 2018-12-11 ASSESSMENT — ENCOUNTER SYMPTOMS
SHORTNESS OF BREATH: 0
ABDOMINAL DISTENTION: 0
RECTAL PAIN: 0
CONSTIPATION: 0
COLOR CHANGE: 0
COLOR CHANGE: 0
COUGH: 0
ANAL BLEEDING: 0
BLOOD IN STOOL: 0
TROUBLE SWALLOWING: 0
EYE PAIN: 0
EYE REDNESS: 0
BLOOD IN STOOL: 0
SHORTNESS OF BREATH: 0
CONSTIPATION: 0
EYE DISCHARGE: 0
ANAL BLEEDING: 0
DIARRHEA: 0
ABDOMINAL PAIN: 0
RECTAL PAIN: 0
NAUSEA: 0
BACK PAIN: 0
NAUSEA: 0
VOICE CHANGE: 0
BACK PAIN: 0
SINUS PRESSURE: 0
EYE PAIN: 0
TROUBLE SWALLOWING: 0
COUGH: 0
VOMITING: 0
SINUS PRESSURE: 0
EYE DISCHARGE: 0
DIARRHEA: 0
CHEST TIGHTNESS: 0
ABDOMINAL PAIN: 0
VOMITING: 0
EYE REDNESS: 0
ABDOMINAL DISTENTION: 1
VOICE CHANGE: 0
CHEST TIGHTNESS: 0

## 2018-12-11 ASSESSMENT — PATIENT HEALTH QUESTIONNAIRE - PHQ9
SUM OF ALL RESPONSES TO PHQ9 QUESTIONS 1 & 2: 0
2. FEELING DOWN, DEPRESSED OR HOPELESS: 0
1. LITTLE INTEREST OR PLEASURE IN DOING THINGS: 0
SUM OF ALL RESPONSES TO PHQ QUESTIONS 1-9: 0
SUM OF ALL RESPONSES TO PHQ QUESTIONS 1-9: 0

## 2018-12-11 NOTE — PROGRESS NOTES
Subjective:      Patient ID: Veronica Gentile is a 80 y.o. female. HPI Here for a recheck. States completed antibiotics and recheck urine was done, still showing blood and nitrates. Symptomatically feels better. No fever, chills, nausea, vomiting or flank pains. Review of Systems   Constitutional: Negative for activity change, appetite change and fatigue. HENT: Negative for dental problem, ear pain, hearing loss, postnasal drip, sinus pressure, sneezing, tinnitus, trouble swallowing and voice change. Eyes: Negative for pain, discharge, redness and visual disturbance. Respiratory: Negative for cough, chest tightness and shortness of breath. Cardiovascular: Negative for chest pain, palpitations and leg swelling. Gastrointestinal: Negative for abdominal distention, abdominal pain, anal bleeding, blood in stool, constipation, diarrhea, nausea, rectal pain and vomiting. Endocrine: Negative for cold intolerance, heat intolerance, polydipsia, polyphagia and polyuria. Genitourinary: Positive for difficulty urinating. Negative for decreased urine volume, dyspareunia, dysuria, enuresis, flank pain, frequency, genital sores, hematuria, menstrual problem, pelvic pain, urgency, vaginal bleeding and vaginal discharge. Musculoskeletal: Positive for gait problem. Negative for arthralgias, back pain, joint swelling, myalgias, neck pain and neck stiffness. Difficulty with getting up from chair, ambulates with cane   Skin: Negative for color change, pallor and rash. Allergic/Immunologic: Negative for environmental allergies, food allergies and immunocompromised state. Neurological: Negative for dizziness, tremors, seizures, syncope, facial asymmetry, speech difficulty, weakness, light-headedness, numbness and headaches. Hematological: Negative for adenopathy. Does not bruise/bleed easily.    Psychiatric/Behavioral: Negative for agitation, behavioral problems, confusion, decreased tablet 0    donepezil (ARICEPT) 10 MG tablet TAKE 1 TABLET BY MOUTH NIGHTLY 30 tablet 3    memantine (NAMENDA) 10 MG tablet TAKE 1 TABLET BY MOUTH TWICE DAILY 60 tablet 5    chlorthalidone (HYGROTON) 25 MG tablet 1/2 a tab twice weekly on Thursday and Sunday mornings 30 tablet 3    darifenacin (ENABLEX) 7.5 MG extended release tablet TAKE 1 TABLET BY MOUTH EVERY DAY  3    ranolazine (RANEXA) 500 MG extended release tablet Take 500 mg by mouth daily      Lactobacillus (ACIDOPHILUS) CAPS capsule Take 2 capsules by mouth daily 60 capsule 2    vitamin D3 (CHOLECALCIFEROL) 400 UNITS TABS tablet Take 400 Units by mouth daily      nitroGLYCERIN (NITROSTAT) 0.4 MG SL tablet Place 1 tablet under the tongue as needed for Chest pain 25 tablet 3    ezetimibe (ZETIA) 10 MG tablet Take 1 tablet by mouth daily 30 tablet 3    Multiple Vitamins-Minerals (CENTRUM SILVER) TABS Take 1 tablet by mouth daily 30 tablet 3    omeprazole (PRILOSEC) 20 MG capsule Take 1 capsule by mouth 2 times daily (Patient taking differently: Take 20 mg by mouth Daily ) 180 capsule 1    aspirin 81 MG tablet Take 1 tablet by mouth daily 30 tablet 3    calcium carbonate 600 MG TABS tablet Take 1 tablet by mouth daily       latanoprost (XALATAN) 0.005 % ophthalmic solution Place 1 drop into both eyes nightly       [DISCONTINUED] cephALEXin (KEFLEX) 250 MG capsule Take 1 capsule by mouth 3 times daily 30 capsule 0     No facility-administered encounter medications on file as of 12/11/2018. Urine c/s came back positive for proteus > 100,000 colonies on 12-12-18. Called in keflex 500 mg BID x 7 days. Las t culture was positive for e coli as well as proteus. E coli was eliminated after 250 mg BID dose for 10 days of keflex. Due to multiple drug allergies will increase dose of keflex and see if effective    Serg Ospina MD

## 2018-12-11 NOTE — PROGRESS NOTES
Objective:   Physical Exam   Constitutional: She is oriented to person, place, and time. She appears well-developed and well-nourished. HENT:   Head: Normocephalic and atraumatic. Right Ear: External ear normal.   Left Ear: External ear normal.   Nose: Nose normal.   Mouth/Throat: Oropharynx is clear and moist. No oropharyngeal exudate. Eyes: Pupils are equal, round, and reactive to light. Conjunctivae and EOM are normal. Right eye exhibits no discharge. Left eye exhibits no discharge. No scleral icterus. Neck: Normal range of motion. Neck supple. No JVD present. No tracheal deviation present. No thyromegaly present. Cardiovascular: Normal rate, regular rhythm, normal heart sounds and intact distal pulses. Exam reveals no gallop and no friction rub. No murmur heard. Pulmonary/Chest: Effort normal and breath sounds normal. No respiratory distress. She has no wheezes. She has no rales. She exhibits no tenderness. Abdominal: Soft. Bowel sounds are normal. She exhibits no distension and no mass. There is no tenderness. There is no rebound and no guarding. Musculoskeletal: Normal range of motion. She exhibits no edema or tenderness. Slow gait with cane   Lymphadenopathy:     She has no cervical adenopathy. Neurological: She is alert and oriented to person, place, and time. She has normal reflexes. No cranial nerve deficit. Coordination normal.   Skin: Skin is warm and dry. No rash noted. Psychiatric: She has a normal mood and affect. Assessment:       Diagnosis Orders   1. Routine general medical examination at a health care facility     2. Need for influenza vaccination  INFLUENZA, HIGH DOSE, 65 YRS +, IM, PF, PREFILL SYR, 0.5ML (FLUZONE HD)   3. Urinary tract infection with hematuria, site unspecified  Urinalysis Reflex to Culture    POCT Urinalysis No Micro (Auto)    DISCONTINUED: cephALEXin (KEFLEX) 250 MG capsule   4. Essential hypertension     5.  Urinary frequency  POCT Urinalysis No Micro (Auto)   6. Gastroesophageal reflux disease, esophagitis presence not specified     7. Renal insufficiency     8. S/P CABG x 2     9.  Vascular dementia without behavioral disturbance  Ludwig Clarke MD, [facility] [place]*   8. Dyslipidemia           Plan:      Orders Placed This Encounter   Procedures    INFLUENZA, HIGH DOSE, 65 YRS +, IM, PF, PREFILL SYR, 0.5ML (FLUZONE HD)    Urinalysis Reflex to Culture   Ludwig Clarke MD, [facility] [place]*    POCT Urinalysis No Micro (Auto)       Outpatient Encounter Prescriptions as of 11/26/2018   Medication Sig Dispense Refill    [DISCONTINUED] cephALEXin (KEFLEX) 250 MG capsule Take 1 capsule by mouth 3 times daily 30 capsule 0    hydrALAZINE (APRESOLINE) 25 MG tablet TAKE 1 TABLET BY MOUTH 3 TIMES DAILY AS NEEDED (SBP >140) 90 tablet 0    donepezil (ARICEPT) 10 MG tablet TAKE 1 TABLET BY MOUTH NIGHTLY 30 tablet 3    memantine (NAMENDA) 10 MG tablet TAKE 1 TABLET BY MOUTH TWICE DAILY 60 tablet 5    chlorthalidone (HYGROTON) 25 MG tablet 1/2 a tab twice weekly on Thursday and Sunday mornings 30 tablet 3    darifenacin (ENABLEX) 7.5 MG extended release tablet TAKE 1 TABLET BY MOUTH EVERY DAY  3    Lactobacillus (ACIDOPHILUS) CAPS capsule Take 2 capsules by mouth daily 60 capsule 2    nitroGLYCERIN (NITROSTAT) 0.4 MG SL tablet Place 1 tablet under the tongue as needed for Chest pain 25 tablet 3    ezetimibe (ZETIA) 10 MG tablet Take 1 tablet by mouth daily 30 tablet 3    Multiple Vitamins-Minerals (CENTRUM SILVER) TABS Take 1 tablet by mouth daily 30 tablet 3    omeprazole (PRILOSEC) 20 MG capsule Take 1 capsule by mouth 2 times daily (Patient taking differently: Take 20 mg by mouth Daily ) 180 capsule 1    aspirin 81 MG tablet Take 1 tablet by mouth daily 30 tablet 3    calcium carbonate 600 MG TABS tablet Take 1 tablet by mouth daily       latanoprost (XALATAN) 0.005 % ophthalmic solution Place 1 drop into both eyes

## 2018-12-12 LAB
CULTURE: ABNORMAL
Lab: ABNORMAL
ORGANISM: ABNORMAL
SPECIMEN DESCRIPTION: ABNORMAL
STATUS: ABNORMAL

## 2018-12-12 RX ORDER — CEPHALEXIN 500 MG/1
500 CAPSULE ORAL 2 TIMES DAILY
Qty: 14 CAPSULE | Refills: 0 | Status: SHIPPED | OUTPATIENT
Start: 2018-12-12 | End: 2018-12-19

## 2018-12-31 DIAGNOSIS — I10 HYPERTENSION, UNSPECIFIED TYPE: ICD-10-CM

## 2018-12-31 DIAGNOSIS — F01.50 VASCULAR DEMENTIA WITHOUT BEHAVIORAL DISTURBANCE (HCC): ICD-10-CM

## 2018-12-31 RX ORDER — DONEPEZIL HYDROCHLORIDE 10 MG/1
TABLET, FILM COATED ORAL
Qty: 30 TABLET | Refills: 3 | Status: SHIPPED | OUTPATIENT
Start: 2018-12-31 | End: 2019-03-05 | Stop reason: SDUPTHER

## 2018-12-31 RX ORDER — HYDRALAZINE HYDROCHLORIDE 25 MG/1
25 TABLET, FILM COATED ORAL 3 TIMES DAILY PRN
Qty: 90 TABLET | Refills: 0 | Status: SHIPPED | OUTPATIENT
Start: 2018-12-31 | End: 2019-02-19 | Stop reason: SDUPTHER

## 2019-01-14 DIAGNOSIS — N39.0 URINARY TRACT INFECTION WITHOUT HEMATURIA, SITE UNSPECIFIED: ICD-10-CM

## 2019-01-14 DIAGNOSIS — F03.90 DEMENTIA WITHOUT BEHAVIORAL DISTURBANCE, UNSPECIFIED DEMENTIA TYPE: ICD-10-CM

## 2019-01-14 RX ORDER — MEMANTINE HYDROCHLORIDE 10 MG/1
TABLET ORAL
Qty: 60 TABLET | Refills: 5 | Status: ON HOLD | OUTPATIENT
Start: 2019-01-14 | End: 2019-04-19

## 2019-01-14 RX ORDER — CEPHALEXIN 500 MG/1
CAPSULE ORAL
Qty: 14 CAPSULE | Refills: 0 | OUTPATIENT
Start: 2019-01-14

## 2019-02-19 DIAGNOSIS — I10 HYPERTENSION, UNSPECIFIED TYPE: ICD-10-CM

## 2019-02-19 RX ORDER — HYDRALAZINE HYDROCHLORIDE 25 MG/1
25 TABLET, FILM COATED ORAL 3 TIMES DAILY PRN
Qty: 90 TABLET | Refills: 0 | Status: ON HOLD | OUTPATIENT
Start: 2019-02-19 | End: 2019-04-19

## 2019-02-27 ENCOUNTER — OFFICE VISIT (OUTPATIENT)
Dept: FAMILY MEDICINE CLINIC | Age: 84
End: 2019-02-27
Payer: MEDICARE

## 2019-02-27 VITALS
HEIGHT: 61 IN | SYSTOLIC BLOOD PRESSURE: 143 MMHG | HEART RATE: 88 BPM | DIASTOLIC BLOOD PRESSURE: 76 MMHG | WEIGHT: 149 LBS | OXYGEN SATURATION: 98 % | BODY MASS INDEX: 28.13 KG/M2

## 2019-02-27 DIAGNOSIS — Z88.9 DRUG ALLERGY, MULTIPLE: ICD-10-CM

## 2019-02-27 DIAGNOSIS — R60.9 EDEMA, UNSPECIFIED TYPE: ICD-10-CM

## 2019-02-27 DIAGNOSIS — N39.0 RECURRENT UTI: Primary | ICD-10-CM

## 2019-02-27 DIAGNOSIS — I10 ESSENTIAL HYPERTENSION: ICD-10-CM

## 2019-02-27 DIAGNOSIS — F01.50 VASCULAR DEMENTIA WITHOUT BEHAVIORAL DISTURBANCE (HCC): ICD-10-CM

## 2019-02-27 DIAGNOSIS — E78.5 DYSLIPIDEMIA: ICD-10-CM

## 2019-02-27 DIAGNOSIS — N32.9 URINARY BLADDER DISORDER: ICD-10-CM

## 2019-02-27 DIAGNOSIS — R42 DIZZINESS: ICD-10-CM

## 2019-02-27 DIAGNOSIS — K21.9 GASTROESOPHAGEAL REFLUX DISEASE, ESOPHAGITIS PRESENCE NOT SPECIFIED: ICD-10-CM

## 2019-02-27 LAB
BILIRUBIN, POC: NORMAL
BLOOD URINE, POC: NORMAL
CLARITY, POC: NORMAL
COLOR, POC: YELLOW
GLUCOSE URINE, POC: NORMAL
KETONES, POC: NORMAL
LEUKOCYTE EST, POC: NORMAL
NITRITE, POC: NORMAL
PH, POC: 6.5
PROTEIN, POC: NORMAL
SPECIFIC GRAVITY, POC: 1.01
UROBILINOGEN, POC: 0.2

## 2019-02-27 PROCEDURE — 99213 OFFICE O/P EST LOW 20 MIN: CPT | Performed by: FAMILY MEDICINE

## 2019-02-27 PROCEDURE — 81003 URINALYSIS AUTO W/O SCOPE: CPT | Performed by: FAMILY MEDICINE

## 2019-02-27 RX ORDER — CEPHALEXIN 250 MG/1
250 CAPSULE ORAL 4 TIMES DAILY
COMMUNITY
End: 2019-02-27 | Stop reason: DRUGHIGH

## 2019-02-27 RX ORDER — CHLORTHALIDONE 25 MG/1
TABLET ORAL
Qty: 30 TABLET | Refills: 3 | Status: ON HOLD | OUTPATIENT
Start: 2019-02-27 | End: 2019-04-19

## 2019-02-27 RX ORDER — LEVOFLOXACIN 250 MG/1
TABLET ORAL
COMMUNITY
Start: 2019-01-08 | End: 2019-02-27 | Stop reason: DRUGHIGH

## 2019-02-27 RX ORDER — CEPHALEXIN 250 MG/1
250 CAPSULE ORAL DAILY
Qty: 90 CAPSULE | Refills: 0 | Status: SHIPPED | OUTPATIENT
Start: 2019-02-27 | End: 2019-04-16 | Stop reason: ALTCHOICE

## 2019-02-27 ASSESSMENT — PATIENT HEALTH QUESTIONNAIRE - PHQ9
SUM OF ALL RESPONSES TO PHQ QUESTIONS 1-9: 0
2. FEELING DOWN, DEPRESSED OR HOPELESS: 0
SUM OF ALL RESPONSES TO PHQ QUESTIONS 1-9: 0
SUM OF ALL RESPONSES TO PHQ9 QUESTIONS 1 & 2: 0
SUM OF ALL RESPONSES TO PHQ9 QUESTIONS 1 & 2: 0
1. LITTLE INTEREST OR PLEASURE IN DOING THINGS: 0
SUM OF ALL RESPONSES TO PHQ QUESTIONS 1-9: 0
1. LITTLE INTEREST OR PLEASURE IN DOING THINGS: 0
2. FEELING DOWN, DEPRESSED OR HOPELESS: 0
SUM OF ALL RESPONSES TO PHQ QUESTIONS 1-9: 0

## 2019-02-27 ASSESSMENT — ENCOUNTER SYMPTOMS
DIARRHEA: 0
ABDOMINAL DISTENTION: 0
BLOOD IN STOOL: 0
BACK PAIN: 0
SHORTNESS OF BREATH: 0
ABDOMINAL PAIN: 0
COLOR CHANGE: 0
ANAL BLEEDING: 0
NAUSEA: 0
CONSTIPATION: 0
COUGH: 0
CHEST TIGHTNESS: 0
EYE PAIN: 0
EYE REDNESS: 0
RECTAL PAIN: 0
EYE DISCHARGE: 0
SINUS PRESSURE: 0
VOMITING: 0
TROUBLE SWALLOWING: 0
VOICE CHANGE: 0

## 2019-03-05 DIAGNOSIS — F01.50 VASCULAR DEMENTIA WITHOUT BEHAVIORAL DISTURBANCE (HCC): ICD-10-CM

## 2019-03-05 RX ORDER — DONEPEZIL HYDROCHLORIDE 10 MG/1
10 TABLET, FILM COATED ORAL NIGHTLY
Qty: 90 TABLET | Refills: 3 | Status: ON HOLD | OUTPATIENT
Start: 2019-03-05 | End: 2020-10-23

## 2019-03-08 ENCOUNTER — TELEPHONE (OUTPATIENT)
Dept: FAMILY MEDICINE CLINIC | Age: 84
End: 2019-03-08

## 2019-03-11 ENCOUNTER — HOSPITAL ENCOUNTER (OUTPATIENT)
Age: 84
Setting detail: SPECIMEN
Discharge: HOME | End: 2019-03-11
Payer: MEDICARE

## 2019-03-11 LAB
ALT SERPL-CCNC: 14 U/L (ref 5–33)
ANION GAP SERPL CALCULATED.3IONS-SCNC: 16 MMOL/L (ref 9–17)
AST SERPL-CCNC: 18 U/L
BUN BLDV-MCNC: 17 MG/DL (ref 8–23)
BUN/CREAT BLD: ABNORMAL (ref 9–20)
CALCIUM SERPL-MCNC: 9.8 MG/DL (ref 8.6–10.4)
CHLORIDE BLD-SCNC: 99 MMOL/L (ref 98–107)
CHOLESTEROL/HDL RATIO: 3.7
CHOLESTEROL: 163 MG/DL
CO2: 26 MMOL/L (ref 20–31)
CREAT SERPL-MCNC: 1.09 MG/DL (ref 0.5–0.9)
ESTIMATED AVERAGE GLUCOSE: 100 MG/DL
GFR AFRICAN AMERICAN: 57 ML/MIN
GFR NON-AFRICAN AMERICAN: 47 ML/MIN
GFR SERPL CREATININE-BSD FRML MDRD: ABNORMAL ML/MIN/{1.73_M2}
GFR SERPL CREATININE-BSD FRML MDRD: ABNORMAL ML/MIN/{1.73_M2}
GLUCOSE BLD-MCNC: 110 MG/DL (ref 70–99)
HBA1C MFR BLD: 5.1 % (ref 4–6)
HDLC SERPL-MCNC: 44 MG/DL
LDL CHOLESTEROL: 97 MG/DL (ref 0–130)
POTASSIUM SERPL-SCNC: 4.1 MMOL/L (ref 3.7–5.3)
SODIUM BLD-SCNC: 141 MMOL/L (ref 135–144)
TRIGL SERPL-MCNC: 108 MG/DL
VLDLC SERPL CALC-MCNC: NORMAL MG/DL (ref 1–30)

## 2019-03-14 ENCOUNTER — OFFICE VISIT (OUTPATIENT)
Dept: FAMILY MEDICINE CLINIC | Age: 84
End: 2019-03-14
Payer: MEDICARE

## 2019-03-14 VITALS
DIASTOLIC BLOOD PRESSURE: 78 MMHG | SYSTOLIC BLOOD PRESSURE: 145 MMHG | HEIGHT: 61 IN | BODY MASS INDEX: 27.34 KG/M2 | OXYGEN SATURATION: 97 % | WEIGHT: 144.8 LBS | HEART RATE: 90 BPM

## 2019-03-14 DIAGNOSIS — I05.9 PULMONARY HYPERTENSION DUE TO MITRAL VALVE DISEASE (HCC): Chronic | ICD-10-CM

## 2019-03-14 DIAGNOSIS — I10 ESSENTIAL HYPERTENSION: ICD-10-CM

## 2019-03-14 DIAGNOSIS — Z98.890 HX OF MITRAL VALVE REPAIR: ICD-10-CM

## 2019-03-14 DIAGNOSIS — F01.50 VASCULAR DEMENTIA WITHOUT BEHAVIORAL DISTURBANCE (HCC): ICD-10-CM

## 2019-03-14 DIAGNOSIS — R32 URINARY INCONTINENCE, UNSPECIFIED TYPE: ICD-10-CM

## 2019-03-14 DIAGNOSIS — Z95.1 S/P CABG X 2: ICD-10-CM

## 2019-03-14 DIAGNOSIS — Z87.440 HISTORY OF RECURRENT UTI (URINARY TRACT INFECTION): ICD-10-CM

## 2019-03-14 DIAGNOSIS — I27.22 PULMONARY HYPERTENSION DUE TO MITRAL VALVE DISEASE (HCC): Chronic | ICD-10-CM

## 2019-03-14 DIAGNOSIS — N39.0 URINARY TRACT INFECTION WITHOUT HEMATURIA, SITE UNSPECIFIED: Primary | ICD-10-CM

## 2019-03-14 DIAGNOSIS — N28.9 RENAL INSUFFICIENCY: ICD-10-CM

## 2019-03-14 DIAGNOSIS — K21.9 GASTROESOPHAGEAL REFLUX DISEASE, ESOPHAGITIS PRESENCE NOT SPECIFIED: ICD-10-CM

## 2019-03-14 DIAGNOSIS — I20.9 ANGINA PECTORIS (HCC): ICD-10-CM

## 2019-03-14 DIAGNOSIS — Z88.9 DRUG ALLERGY, MULTIPLE: ICD-10-CM

## 2019-03-14 LAB
BILIRUBIN, POC: NORMAL
BLOOD URINE, POC: NORMAL
CLARITY, POC: NORMAL
COLOR, POC: YELLOW
GLUCOSE URINE, POC: NORMAL
KETONES, POC: NORMAL
LEUKOCYTE EST, POC: NORMAL
NITRITE, POC: POSITIVE
PH, POC: 6.5
PROTEIN, POC: NORMAL
SPECIFIC GRAVITY, POC: 1.01
UROBILINOGEN, POC: NORMAL

## 2019-03-14 PROCEDURE — 81003 URINALYSIS AUTO W/O SCOPE: CPT | Performed by: FAMILY MEDICINE

## 2019-03-14 PROCEDURE — 99214 OFFICE O/P EST MOD 30 MIN: CPT | Performed by: FAMILY MEDICINE

## 2019-03-14 PROCEDURE — 1111F DSCHRG MED/CURRENT MED MERGE: CPT | Performed by: FAMILY MEDICINE

## 2019-03-14 ASSESSMENT — ENCOUNTER SYMPTOMS
COUGH: 0
EYE PAIN: 0
SINUS PRESSURE: 0
RECTAL PAIN: 0
NAUSEA: 0
CHEST TIGHTNESS: 0
ANAL BLEEDING: 0
DIARRHEA: 0
EYE DISCHARGE: 0
EYE REDNESS: 0
ABDOMINAL PAIN: 0
VOMITING: 0
TROUBLE SWALLOWING: 0
BACK PAIN: 0
VOICE CHANGE: 0
ABDOMINAL DISTENTION: 0
SHORTNESS OF BREATH: 0
BLOOD IN STOOL: 0
CONSTIPATION: 0
COLOR CHANGE: 0

## 2019-03-14 ASSESSMENT — PATIENT HEALTH QUESTIONNAIRE - PHQ9
SUM OF ALL RESPONSES TO PHQ QUESTIONS 1-9: 0
1. LITTLE INTEREST OR PLEASURE IN DOING THINGS: 0
SUM OF ALL RESPONSES TO PHQ9 QUESTIONS 1 & 2: 0
2. FEELING DOWN, DEPRESSED OR HOPELESS: 0
SUM OF ALL RESPONSES TO PHQ QUESTIONS 1-9: 0

## 2019-04-16 ENCOUNTER — HOSPITAL ENCOUNTER (EMERGENCY)
Age: 84
Discharge: HOME | DRG: 690 | End: 2019-04-16
Attending: EMERGENCY MEDICINE
Payer: MEDICARE

## 2019-04-16 VITALS
OXYGEN SATURATION: 98 % | DIASTOLIC BLOOD PRESSURE: 61 MMHG | RESPIRATION RATE: 16 BRPM | HEIGHT: 62 IN | TEMPERATURE: 97.7 F | BODY MASS INDEX: 26.59 KG/M2 | WEIGHT: 144.5 LBS | HEART RATE: 95 BPM | SYSTOLIC BLOOD PRESSURE: 150 MMHG

## 2019-04-16 DIAGNOSIS — N30.00 ACUTE CYSTITIS WITHOUT HEMATURIA: Primary | ICD-10-CM

## 2019-04-16 LAB
-: NORMAL
ABSOLUTE EOS #: 0.1 K/UL (ref 0–0.4)
ABSOLUTE IMMATURE GRANULOCYTE: ABNORMAL K/UL (ref 0–0.3)
ABSOLUTE LYMPH #: 1.1 K/UL (ref 1–4.8)
ABSOLUTE MONO #: 0.7 K/UL (ref 0.2–0.8)
ALBUMIN SERPL-MCNC: 3.9 G/DL (ref 3.5–5.2)
ALBUMIN/GLOBULIN RATIO: ABNORMAL (ref 1–2.5)
ALP BLD-CCNC: 60 U/L (ref 35–104)
ALT SERPL-CCNC: 13 U/L (ref 5–33)
AMORPHOUS: NORMAL
ANION GAP SERPL CALCULATED.3IONS-SCNC: 12 MMOL/L (ref 9–17)
AST SERPL-CCNC: 16 U/L
BACTERIA: NORMAL
BASOPHILS # BLD: 1 % (ref 0–2)
BASOPHILS ABSOLUTE: 0.1 K/UL (ref 0–0.2)
BILIRUB SERPL-MCNC: 0.35 MG/DL (ref 0.3–1.2)
BILIRUBIN URINE: NEGATIVE
BUN BLDV-MCNC: 17 MG/DL (ref 8–23)
BUN/CREAT BLD: 15 (ref 9–20)
CALCIUM SERPL-MCNC: 9.9 MG/DL (ref 8.6–10.4)
CASTS UA: NORMAL /LPF
CHLORIDE BLD-SCNC: 102 MMOL/L (ref 98–107)
CO2: 27 MMOL/L (ref 20–31)
COLOR: YELLOW
COMMENT UA: ABNORMAL
CREAT SERPL-MCNC: 1.11 MG/DL (ref 0.5–0.9)
CRYSTALS, UA: NORMAL /HPF
DIFFERENTIAL TYPE: ABNORMAL
EOSINOPHILS RELATIVE PERCENT: 1 % (ref 1–4)
EPITHELIAL CELLS UA: NORMAL /HPF (ref 0–5)
GFR AFRICAN AMERICAN: 56 ML/MIN
GFR NON-AFRICAN AMERICAN: 46 ML/MIN
GFR SERPL CREATININE-BSD FRML MDRD: ABNORMAL ML/MIN/{1.73_M2}
GFR SERPL CREATININE-BSD FRML MDRD: ABNORMAL ML/MIN/{1.73_M2}
GLUCOSE BLD-MCNC: 112 MG/DL (ref 70–99)
GLUCOSE URINE: NEGATIVE
HCT VFR BLD CALC: 41.5 % (ref 36–46)
HEMOGLOBIN: 14.3 G/DL (ref 12–16)
IMMATURE GRANULOCYTES: ABNORMAL %
KETONES, URINE: NEGATIVE
LEUKOCYTE ESTERASE, URINE: ABNORMAL
LYMPHOCYTES # BLD: 16 % (ref 24–44)
MCH RBC QN AUTO: 31.9 PG (ref 26–34)
MCHC RBC AUTO-ENTMCNC: 34.3 G/DL (ref 31–37)
MCV RBC AUTO: 92.8 FL (ref 80–100)
MONOCYTES # BLD: 10 % (ref 1–7)
MUCUS: NORMAL
NITRITE, URINE: NEGATIVE
NRBC AUTOMATED: ABNORMAL PER 100 WBC
OTHER OBSERVATIONS UA: NORMAL
PDW BLD-RTO: 12.8 % (ref 11.5–14.5)
PH UA: 6.5 (ref 5–8)
PLATELET # BLD: 206 K/UL (ref 130–400)
PLATELET ESTIMATE: ABNORMAL
PMV BLD AUTO: ABNORMAL FL (ref 6–12)
POTASSIUM SERPL-SCNC: 4.1 MMOL/L (ref 3.7–5.3)
PROTEIN UA: ABNORMAL
RBC # BLD: 4.47 M/UL (ref 4–5.2)
RBC # BLD: ABNORMAL 10*6/UL
RBC UA: NORMAL /HPF (ref 0–2)
RENAL EPITHELIAL, UA: NORMAL /HPF
SEG NEUTROPHILS: 72 % (ref 36–66)
SEGMENTED NEUTROPHILS ABSOLUTE COUNT: 4.7 K/UL (ref 1.8–7.7)
SODIUM BLD-SCNC: 141 MMOL/L (ref 135–144)
SPECIFIC GRAVITY UA: 1.02 (ref 1–1.03)
TOTAL PROTEIN: 6.7 G/DL (ref 6.4–8.3)
TRICHOMONAS: NORMAL
TURBIDITY: ABNORMAL
URINE HGB: ABNORMAL
UROBILINOGEN, URINE: NORMAL
WBC # BLD: 6.7 K/UL (ref 3.5–11)
WBC # BLD: ABNORMAL 10*3/UL
WBC UA: NORMAL /HPF (ref 0–5)
YEAST: NORMAL

## 2019-04-16 PROCEDURE — 80053 COMPREHEN METABOLIC PANEL: CPT

## 2019-04-16 PROCEDURE — 81001 URINALYSIS AUTO W/SCOPE: CPT

## 2019-04-16 PROCEDURE — 87086 URINE CULTURE/COLONY COUNT: CPT

## 2019-04-16 PROCEDURE — 99284 EMERGENCY DEPT VISIT MOD MDM: CPT

## 2019-04-16 PROCEDURE — 85025 COMPLETE CBC W/AUTO DIFF WBC: CPT

## 2019-04-16 PROCEDURE — 87186 SC STD MICRODIL/AGAR DIL: CPT

## 2019-04-16 PROCEDURE — 87077 CULTURE AEROBIC IDENTIFY: CPT

## 2019-04-16 RX ORDER — CEFDINIR 300 MG/1
300 CAPSULE ORAL 2 TIMES DAILY
Qty: 14 CAPSULE | Refills: 0 | Status: ON HOLD | OUTPATIENT
Start: 2019-04-16 | End: 2019-04-21 | Stop reason: HOSPADM

## 2019-04-16 ASSESSMENT — ENCOUNTER SYMPTOMS: ABDOMINAL PAIN: 1

## 2019-04-16 ASSESSMENT — PAIN SCALES - GENERAL: PAINLEVEL_OUTOF10: 4

## 2019-04-16 NOTE — ED NOTES
ASSESSMENT:   Presents to ED per pvt auto with  with c/o UTI symptoms. Having suprapubic tenderness, freq urination in small amt's. Was up 5 times last nite. Been going on for past week. Was treated a month with keflex 1 month ago for UTI and still having problems. Sees Dr Maranda Cole and has an appt in 1 week.  states can't wait that long. Denies fever or chills. Denies n/v. On admission is pleasant. Unable to void on admission.      Adrienne Overton, DINA  04/16/19 [id number]

## 2019-04-16 NOTE — ED PROVIDER NOTES
EMERGENCY DEPARTMENT ENCOUNTER    Pt Name: Eliz Nugent  MRN: [id number]  [place] 1/12/1929  Date of evaluation: 4/16/19  CHIEF COMPLAINT       Chief Complaint   Patient presents with    Urinary Tract Infection     HISTORY OF PRESENT ILLNESS   Presents with dysuria for 1 week.  treating with pre-existing Rx for Keflex. Started with 250 mg once daily, but increased to twice daily. No fever. Still ambulatory with cane. The history is provided by the spouse and the patient. History limited by: Dementia. Female  Problem   Pain quality:  Burning  Pain severity:  Mild  Onset quality:  Gradual  Duration:  1 week  Timing:  Intermittent  Progression:  Waxing and waning  Chronicity:  New  Recent urinary tract infections: yes    Relieved by:  Nothing  Worsened by:  Nothing  Ineffective treatments:  None tried  Urinary symptoms: frequent urination    Associated symptoms: abdominal pain (suprapubic)    Associated symptoms: no fever    Risk factors: recurrent urinary tract infections        REVIEW OF SYSTEMS     Review of Systems   Unable to perform ROS: Dementia   Constitutional: Negative for fever. Gastrointestinal: Positive for abdominal pain (suprapubic).      PASTMEDICAL HISTORY     Past Medical History:   Diagnosis Date    Arthritis     CAD (coronary artery disease)     CHF (congestive heart failure) (HCC)     Dementia     Dyslipidemia     GERD (gastroesophageal reflux disease)     Hypertension     Osteoporosis     Renal insufficiency     Urinary incontinence      SURGICAL HISTORY       Past Surgical History:   Procedure Laterality Date    APPENDECTOMY      CARDIAC CATHETERIZATION  4-    CORONARY ARTERY BYPASS GRAFT N/A 2003    x2 with mitro valve repair    DILATION AND CURETTAGE OF UTERUS      EYE SURGERY Right     tear duct    HERNIA REPAIR      HYSTERECTOMY       CURRENT MEDICATIONS       Previous Medications    ASPIRIN 81 MG TABLET    Take 1 tablet by mouth daily CALCIUM CARBONATE 600 MG TABS TABLET    Take 1 tablet by mouth daily     CHLORTHALIDONE (HYGROTON) 25 MG TABLET    1/2 a tab twice weekly on Thursday and  mornings    DARIFENACIN (ENABLEX) 7.5 MG EXTENDED RELEASE TABLET    TAKE 1 TABLET BY MOUTH EVERY DAY    DONEPEZIL (ARICEPT) 10 MG TABLET    Take 1 tablet by mouth nightly    EZETIMIBE (ZETIA) 10 MG TABLET    Take 1 tablet by mouth daily    HYDRALAZINE (APRESOLINE) 25 MG TABLET    TAKE 1 TABLET BY MOUTH 3 TIMES DAILY AS NEEDED (SBP >140)    LACTOBACILLUS (ACIDOPHILUS) CAPS CAPSULE    Take 2 capsules by mouth daily    LATANOPROST (XALATAN) 0.005 % OPHTHALMIC SOLUTION    Place 1 drop into both eyes nightly     MEMANTINE (NAMENDA) 10 MG TABLET    TAKE 1 TABLET BY MOUTH TWICE DAILY    MULTIPLE VITAMINS-MINERALS (CENTRUM SILVER) TABS    Take 1 tablet by mouth daily    NITROGLYCERIN (NITROSTAT) 0.4 MG SL TABLET    Place 1 tablet under the tongue as needed for Chest pain    OMEPRAZOLE (PRILOSEC) 20 MG CAPSULE    Take 1 capsule by mouth 2 times daily    RANOLAZINE (RANEXA) 500 MG EXTENDED RELEASE TABLET    Take 500 mg by mouth daily     ALLERGIES     is allergic to latex; actonel [risedronate sodium]; adhesive tape; aminophylline; amlodipine; celebrex [celecoxib]; chlorthalidone; ciprofloxacin; codeine; colesevelam hcl; darvocet a500 [propoxyphene n-acetaminophen]; demerol hcl [meperidine]; lisinopril; lovastatin; macrobid [nitrofurantoin monohyd macro]; metoprolol; nitrofuran derivatives; nitrofurantoin; propoxyphene; ranitidine; ranitidine hcl; simvastatin; tramadol hcl; tramadol hcl; trimethadione; trimethoprim; ultram [tramadol]; vioxx [rofecoxib]; naproxen; and sulfa antibiotics. FAMILY HISTORY     indicated that her mother is . She indicated that her father is .      SOCIAL HISTORY       Social History     Tobacco Use    Smoking status: Never Smoker    Smokeless tobacco: Never Used   Substance Use Topics    Alcohol use: No     Comment: very little    Drug use: No     PHYSICAL EXAM     INITIAL VITALS: BP (!) 150/61   Pulse 95   Temp 97.7 °F (36.5 °C)   Resp 16   Ht 5' 2\" (1.575 m)   Wt 144 lb 8 oz (65.5 kg)   SpO2 98%   BMI 26.43 kg/m²    Physical Exam   Constitutional: She appears well-developed and well-nourished. Mild distress   HENT:   Mouth/Throat: Oropharynx is clear and moist. No oropharyngeal exudate. Eyes: Conjunctivae are normal. No scleral icterus. Cardiovascular: Normal rate, regular rhythm and intact distal pulses. Pulmonary/Chest: Effort normal and breath sounds normal. She has no rales. Abdominal: Soft. There is tenderness (mild) in the suprapubic area. There is no guarding. Neurological: She is alert. She exhibits normal muscle tone. Coordination normal.   Oriented to person, and borderline to place. Skin: Capillary refill takes less than 2 seconds. No rash noted. No pallor. Psychiatric: She has a normal mood and affect. Her behavior is normal.       MEDICAL DECISION MAKING:   Plan for U/A and basic labs.  agreeable. U/A shows numerous WBC's, with no bacteria. Suspect partially treated UTI. Labs show no leukocytosis. Old records show Proteus and E coli on previous urine cultures. I reviewed sensitivities of both. Patient has many antibiotic allergies. Best to continue Keflex or change to Cefdinir.  prefers to switch to Cefdinir. Rx provided. Stop Keflex. F/u with PCP. Return immediately for generalized weakness, altered mental status, fever, or any other concerns. CRITICAL CARE:       PROCEDURES:    Procedures    DIAGNOSTIC RESULTS   EKG:All EKG's are interpreted by the Emergency Department Physician who either signs or Co-signs this chart in the absence of a cardiologist.        RADIOLOGY:All plain film, CT, MRI, and formal ultrasound images (except ED bedside ultrasound) are read by the radiologist, see reports below, unless otherwisenoted in MDM or here.   No orders to display     LABS: All lab results were reviewed by myself, and all abnormals are listed below. Labs Reviewed   URINE RT REFLEX TO CULTURE - Abnormal; Notable for the following components:       Result Value    Turbidity UA TURBID (*)     Urine Hgb 1+ (*)     Protein, UA 2+ (*)     Leukocyte Esterase, Urine LARGE (*)     All other components within normal limits   CBC WITH AUTO DIFFERENTIAL - Abnormal; Notable for the following components:    Seg Neutrophils 72 (*)     Lymphocytes 16 (*)     Monocytes 10 (*)     All other components within normal limits   COMPREHENSIVE METABOLIC PANEL - Abnormal; Notable for the following components:    Glucose 112 (*)     CREATININE 1.11 (*)     GFR Non- 46 (*)     GFR  56 (*)     All other components within normal limits   CULTURE, URINE CATHETER   MICROSCOPIC URINALYSIS       EMERGENCY DEPARTMENTCOURSE:         Vitals:    Vitals:    04/16/19 1001 04/16/19 1002   BP: (!) 150/61    Pulse: 95    Resp: 16    Temp: 97.7 °F (36.5 °C)    SpO2: 98%    Weight:  144 lb 8 oz (65.5 kg)   Height:  5' 2\" (1.575 m)       The patient was given the following medications while in the emergency department:  Orders Placed This Encounter   Medications    cefdinir (OMNICEF) 300 MG capsule     Sig: Take 1 capsule by mouth 2 times daily for 7 days     Dispense:  14 capsule     Refill:  0     CONSULTS:  None    FINAL IMPRESSION      1.  Acute cystitis without hematuria          DISPOSITION/PLAN   DISPOSITION Decision To Discharge 04/16/2019 11:36:14 AM      PATIENT REFERRED TO:  Lana Escobar MD  [address] [place] [address]  [phone]    In 3 days      DISCHARGE MEDICATIONS:  New Prescriptions    CEFDINIR (OMNICEF) 300 MG CAPSULE    Take 1 capsule by mouth 2 times daily for 7 days     Gerry Staton MD  Attending Emergency Physician                    Teresa Jiménez MD  04/16/19 [id number]

## 2019-04-18 LAB
CULTURE: ABNORMAL
Lab: ABNORMAL
SPECIMEN DESCRIPTION: ABNORMAL

## 2019-04-19 ENCOUNTER — HOSPITAL ENCOUNTER (INPATIENT)
Age: 84
LOS: 2 days | Discharge: HOME | DRG: 690 | End: 2019-04-21
Attending: FAMILY MEDICINE | Admitting: FAMILY MEDICINE
Payer: MEDICARE

## 2019-04-19 ENCOUNTER — APPOINTMENT (OUTPATIENT)
Dept: ULTRASOUND IMAGING | Age: 84
DRG: 690 | End: 2019-04-19
Attending: FAMILY MEDICINE
Payer: MEDICARE

## 2019-04-19 ENCOUNTER — OFFICE VISIT (OUTPATIENT)
Dept: FAMILY MEDICINE CLINIC | Age: 84
End: 2019-04-19

## 2019-04-19 VITALS
SYSTOLIC BLOOD PRESSURE: 162 MMHG | BODY MASS INDEX: 27.19 KG/M2 | HEIGHT: 61 IN | TEMPERATURE: 97.3 F | WEIGHT: 144 LBS | OXYGEN SATURATION: 98 % | RESPIRATION RATE: 14 BRPM | HEART RATE: 93 BPM | DIASTOLIC BLOOD PRESSURE: 77 MMHG

## 2019-04-19 DIAGNOSIS — N39.0 PSEUDOMONAS URINARY TRACT INFECTION: ICD-10-CM

## 2019-04-19 DIAGNOSIS — Z87.440 HISTORY OF RECURRENT UTI (URINARY TRACT INFECTION): ICD-10-CM

## 2019-04-19 DIAGNOSIS — B96.5 PSEUDOMONAS URINARY TRACT INFECTION: ICD-10-CM

## 2019-04-19 DIAGNOSIS — R60.9 EDEMA, UNSPECIFIED TYPE: ICD-10-CM

## 2019-04-19 DIAGNOSIS — R53.1 WEAKNESS: Primary | ICD-10-CM

## 2019-04-19 DIAGNOSIS — N18.30 CHRONIC RENAL FAILURE, STAGE 3 (MODERATE) (HCC): ICD-10-CM

## 2019-04-19 DIAGNOSIS — R06.02 SOB (SHORTNESS OF BREATH) ON EXERTION: ICD-10-CM

## 2019-04-19 DIAGNOSIS — I10 ESSENTIAL HYPERTENSION: ICD-10-CM

## 2019-04-19 LAB
-: ABNORMAL
-: NORMAL
ABSOLUTE EOS #: 0.1 K/UL (ref 0–0.4)
ABSOLUTE IMMATURE GRANULOCYTE: ABNORMAL K/UL (ref 0–0.3)
ABSOLUTE LYMPH #: 1.5 K/UL (ref 1–4.8)
ABSOLUTE MONO #: 0.5 K/UL (ref 0.2–0.8)
ALBUMIN SERPL-MCNC: 4.1 G/DL (ref 3.5–5.2)
ALBUMIN/GLOBULIN RATIO: ABNORMAL (ref 1–2.5)
ALP BLD-CCNC: 66 U/L (ref 35–104)
ALT SERPL-CCNC: 13 U/L (ref 5–33)
AMORPHOUS: ABNORMAL
ANION GAP SERPL CALCULATED.3IONS-SCNC: 13 MMOL/L (ref 9–17)
AST SERPL-CCNC: 17 U/L
BACTERIA: ABNORMAL
BASOPHILS # BLD: 0 % (ref 0–2)
BASOPHILS ABSOLUTE: 0 K/UL (ref 0–0.2)
BILIRUB SERPL-MCNC: 0.41 MG/DL (ref 0.3–1.2)
BILIRUBIN DIRECT: 0.11 MG/DL
BILIRUBIN URINE: NEGATIVE
BILIRUBIN, INDIRECT: 0.3 MG/DL (ref 0–1)
BILIRUBIN, POC: NORMAL
BLOOD URINE, POC: NORMAL
BUN BLDV-MCNC: 15 MG/DL (ref 8–23)
CALCIUM SERPL-MCNC: 9.9 MG/DL (ref 8.6–10.4)
CASTS UA: ABNORMAL /LPF
CHLORIDE BLD-SCNC: 100 MMOL/L (ref 98–107)
CLARITY, POC: NORMAL
CO2: 27 MMOL/L (ref 20–31)
COLOR, POC: NORMAL
COLOR: YELLOW
COMMENT UA: ABNORMAL
CREAT SERPL-MCNC: 0.95 MG/DL (ref 0.5–0.9)
CRYSTALS, UA: ABNORMAL /HPF
DIFFERENTIAL TYPE: ABNORMAL
EOSINOPHILS RELATIVE PERCENT: 1 % (ref 1–4)
EPITHELIAL CELLS UA: ABNORMAL /HPF (ref 0–5)
GFR AFRICAN AMERICAN: >60 ML/MIN
GFR NON-AFRICAN AMERICAN: 55 ML/MIN
GFR SERPL CREATININE-BSD FRML MDRD: ABNORMAL ML/MIN/{1.73_M2}
GFR SERPL CREATININE-BSD FRML MDRD: ABNORMAL ML/MIN/{1.73_M2}
GLUCOSE BLD-MCNC: 100 MG/DL (ref 70–99)
GLUCOSE URINE, POC: NORMAL
GLUCOSE URINE: NEGATIVE
HCT VFR BLD CALC: 43 % (ref 36–46)
HEMOGLOBIN: 14.5 G/DL (ref 12–16)
IMMATURE GRANULOCYTES: ABNORMAL %
KETONES, POC: NORMAL
KETONES, URINE: NEGATIVE
LEUKOCYTE EST, POC: NORMAL
LEUKOCYTE ESTERASE, URINE: ABNORMAL
LYMPHOCYTES # BLD: 23 % (ref 24–44)
MCH RBC QN AUTO: 31.3 PG (ref 26–34)
MCHC RBC AUTO-ENTMCNC: 33.6 G/DL (ref 31–37)
MCV RBC AUTO: 92.9 FL (ref 80–100)
MONOCYTES # BLD: 9 % (ref 1–7)
MUCUS: ABNORMAL
NITRITE, POC: NORMAL
NITRITE, URINE: NEGATIVE
NRBC AUTOMATED: ABNORMAL PER 100 WBC
OTHER OBSERVATIONS UA: ABNORMAL
PDW BLD-RTO: 13.2 % (ref 11.5–14.5)
PH UA: 6 (ref 5–8)
PH, POC: 7.5
PLATELET # BLD: 214 K/UL (ref 130–400)
PLATELET ESTIMATE: ABNORMAL
PMV BLD AUTO: ABNORMAL FL (ref 6–12)
POTASSIUM SERPL-SCNC: 3.6 MMOL/L (ref 3.7–5.3)
PROTEIN UA: ABNORMAL
PROTEIN, POC: 2000
RBC # BLD: 4.62 M/UL (ref 4–5.2)
RBC # BLD: ABNORMAL 10*6/UL
RBC UA: ABNORMAL /HPF (ref 0–2)
REASON FOR REJECTION: NORMAL
RENAL EPITHELIAL, UA: ABNORMAL /HPF
SEG NEUTROPHILS: 67 % (ref 36–66)
SEGMENTED NEUTROPHILS ABSOLUTE COUNT: 4.3 K/UL (ref 1.8–7.7)
SODIUM BLD-SCNC: 140 MMOL/L (ref 135–144)
SPECIFIC GRAVITY UA: 1.01 (ref 1–1.03)
SPECIFIC GRAVITY, POC: 1.02
TOTAL PROTEIN: 7.3 G/DL (ref 6.4–8.3)
TRICHOMONAS: ABNORMAL
TURBIDITY: ABNORMAL
URINE HGB: ABNORMAL
UROBILINOGEN, POC: NORMAL
UROBILINOGEN, URINE: NORMAL
WBC # BLD: 6.4 K/UL (ref 3.5–11)
WBC # BLD: ABNORMAL 10*3/UL
WBC UA: ABNORMAL /HPF (ref 0–5)
YEAST: ABNORMAL
ZZ NTE CLEAN UP: ORDERED TEST: NORMAL
ZZ NTE WITH NAME CLEAN UP: SPECIMEN SOURCE: NORMAL

## 2019-04-19 PROCEDURE — 36415 COLL VENOUS BLD VENIPUNCTURE: CPT

## 2019-04-19 PROCEDURE — 87077 CULTURE AEROBIC IDENTIFY: CPT

## 2019-04-19 PROCEDURE — 2580000003 HC RX 258: Performed by: FAMILY MEDICINE

## 2019-04-19 PROCEDURE — 82248 BILIRUBIN DIRECT: CPT

## 2019-04-19 PROCEDURE — 99222 1ST HOSP IP/OBS MODERATE 55: CPT | Performed by: INTERNAL MEDICINE

## 2019-04-19 PROCEDURE — 87086 URINE CULTURE/COLONY COUNT: CPT

## 2019-04-19 PROCEDURE — 87040 BLOOD CULTURE FOR BACTERIA: CPT

## 2019-04-19 PROCEDURE — 76770 US EXAM ABDO BACK WALL COMP: CPT

## 2019-04-19 PROCEDURE — 81001 URINALYSIS AUTO W/SCOPE: CPT

## 2019-04-19 PROCEDURE — 2500000003 HC RX 250 WO HCPCS: Performed by: FAMILY MEDICINE

## 2019-04-19 PROCEDURE — 80053 COMPREHEN METABOLIC PANEL: CPT

## 2019-04-19 PROCEDURE — 85025 COMPLETE CBC W/AUTO DIFF WBC: CPT

## 2019-04-19 PROCEDURE — 1200000000 HC SEMI PRIVATE

## 2019-04-19 PROCEDURE — 51702 INSERT TEMP BLADDER CATH: CPT

## 2019-04-19 PROCEDURE — 6370000000 HC RX 637 (ALT 250 FOR IP): Performed by: FAMILY MEDICINE

## 2019-04-19 PROCEDURE — 99223 1ST HOSP IP/OBS HIGH 75: CPT | Performed by: FAMILY MEDICINE

## 2019-04-19 PROCEDURE — 87186 SC STD MICRODIL/AGAR DIL: CPT

## 2019-04-19 RX ORDER — LATANOPROST 50 UG/ML
1 SOLUTION/ DROPS OPHTHALMIC NIGHTLY
Status: DISCONTINUED | OUTPATIENT
Start: 2019-04-19 | End: 2019-04-21 | Stop reason: HOSPADM

## 2019-04-19 RX ORDER — SODIUM CHLORIDE 9 MG/ML
INJECTION, SOLUTION INTRAVENOUS CONTINUOUS
Status: DISCONTINUED | OUTPATIENT
Start: 2019-04-19 | End: 2019-04-21 | Stop reason: HOSPADM

## 2019-04-19 RX ORDER — ASPIRIN 81 MG/1
81 TABLET ORAL DAILY
Status: DISCONTINUED | OUTPATIENT
Start: 2019-04-20 | End: 2019-04-21 | Stop reason: HOSPADM

## 2019-04-19 RX ORDER — TROSPIUM CHLORIDE 20 MG/1
20 TABLET, FILM COATED ORAL
Status: DISCONTINUED | OUTPATIENT
Start: 2019-04-19 | End: 2019-04-21 | Stop reason: HOSPADM

## 2019-04-19 RX ORDER — HYDRALAZINE HYDROCHLORIDE 25 MG/1
25 TABLET, FILM COATED ORAL 3 TIMES DAILY PRN
Status: DISCONTINUED | OUTPATIENT
Start: 2019-04-19 | End: 2019-04-21 | Stop reason: HOSPADM

## 2019-04-19 RX ORDER — MEMANTINE HYDROCHLORIDE 10 MG/1
10 TABLET ORAL 2 TIMES DAILY
Status: DISCONTINUED | OUTPATIENT
Start: 2019-04-19 | End: 2019-04-21 | Stop reason: HOSPADM

## 2019-04-19 RX ORDER — PANTOPRAZOLE SODIUM 40 MG/1
40 TABLET, DELAYED RELEASE ORAL
Status: DISCONTINUED | OUTPATIENT
Start: 2019-04-20 | End: 2019-04-21 | Stop reason: HOSPADM

## 2019-04-19 RX ORDER — DONEPEZIL HYDROCHLORIDE 10 MG/1
10 TABLET, FILM COATED ORAL NIGHTLY
Status: DISCONTINUED | OUTPATIENT
Start: 2019-04-19 | End: 2019-04-21 | Stop reason: HOSPADM

## 2019-04-19 RX ORDER — RANOLAZINE 500 MG/1
500 TABLET, EXTENDED RELEASE ORAL DAILY
Status: DISCONTINUED | OUTPATIENT
Start: 2019-04-19 | End: 2019-04-20 | Stop reason: ALTCHOICE

## 2019-04-19 RX ORDER — EZETIMIBE 10 MG/1
10 TABLET ORAL DAILY
Status: DISCONTINUED | OUTPATIENT
Start: 2019-04-19 | End: 2019-04-19 | Stop reason: CLARIF

## 2019-04-19 RX ORDER — DARIFENACIN HYDROBROMIDE 7.5 MG/1
7.5 TABLET, EXTENDED RELEASE ORAL DAILY
COMMUNITY
End: 2019-09-18 | Stop reason: ALTCHOICE

## 2019-04-19 RX ORDER — CHLORTHALIDONE 25 MG/1
12.5 TABLET ORAL DAILY
Status: DISCONTINUED | OUTPATIENT
Start: 2019-04-19 | End: 2019-04-20 | Stop reason: ALTCHOICE

## 2019-04-19 RX ORDER — HYDRALAZINE HYDROCHLORIDE 25 MG/1
25 TABLET, FILM COATED ORAL 3 TIMES DAILY PRN
COMMUNITY
End: 2019-05-02 | Stop reason: SDUPTHER

## 2019-04-19 RX ORDER — CHLORTHALIDONE 25 MG/1
12.5 TABLET ORAL
Status: ON HOLD | COMMUNITY
End: 2020-10-23

## 2019-04-19 RX ORDER — DARIFENACIN HYDROBROMIDE 7.5 MG/1
1 TABLET, EXTENDED RELEASE ORAL DAILY
Status: DISCONTINUED | OUTPATIENT
Start: 2019-04-19 | End: 2019-04-19 | Stop reason: CLARIF

## 2019-04-19 RX ORDER — MEMANTINE HYDROCHLORIDE 10 MG/1
10 TABLET ORAL 2 TIMES DAILY
COMMUNITY
End: 2019-09-12 | Stop reason: SDUPTHER

## 2019-04-19 RX ORDER — NITROGLYCERIN 0.4 MG/1
0.4 TABLET SUBLINGUAL PRN
Status: DISCONTINUED | OUTPATIENT
Start: 2019-04-19 | End: 2019-04-21 | Stop reason: HOSPADM

## 2019-04-19 RX ADMIN — MEMANTINE HYDROCHLORIDE 10 MG: 10 TABLET, FILM COATED ORAL at 20:54

## 2019-04-19 RX ADMIN — DONEPEZIL HYDROCHLORIDE 10 MG: 10 TABLET, FILM COATED ORAL at 20:54

## 2019-04-19 RX ADMIN — SODIUM CHLORIDE: 9 INJECTION, SOLUTION INTRAVENOUS at 14:59

## 2019-04-19 RX ADMIN — TAZOBACTAM SODIUM AND PIPERACILLIN SODIUM 3.38 G: 375; 3 INJECTION, SOLUTION INTRAVENOUS at 14:59

## 2019-04-19 RX ADMIN — TROSPIUM CHLORIDE 20 MG: 20 TABLET, FILM COATED ORAL at 17:16

## 2019-04-19 RX ADMIN — LATANOPROST 1 DROP: 50 SOLUTION OPHTHALMIC at 20:59

## 2019-04-19 RX ADMIN — TAZOBACTAM SODIUM AND PIPERACILLIN SODIUM 3.38 G: 375; 3 INJECTION, SOLUTION INTRAVENOUS at 20:55

## 2019-04-19 RX ADMIN — HYDRALAZINE HYDROCHLORIDE 25 MG: 25 TABLET, FILM COATED ORAL at 20:59

## 2019-04-19 ASSESSMENT — ENCOUNTER SYMPTOMS
DIARRHEA: 0
COUGH: 0
NAUSEA: 0
VOMITING: 0
RECTAL PAIN: 0
ABDOMINAL PAIN: 0
BACK PAIN: 0
RECTAL PAIN: 0
ABDOMINAL DISTENTION: 0
CHEST TIGHTNESS: 0
CHEST TIGHTNESS: 0
DIARRHEA: 0
VOICE CHANGE: 0
CONSTIPATION: 0
EYE DISCHARGE: 0
BACK PAIN: 0
SINUS PRESSURE: 0
CONSTIPATION: 0
SINUS PRESSURE: 0
ANAL BLEEDING: 0
SHORTNESS OF BREATH: 0
COUGH: 0
VOICE CHANGE: 0
BLOOD IN STOOL: 0
EYE DISCHARGE: 0
EYE REDNESS: 0
SHORTNESS OF BREATH: 0
EYE PAIN: 0
COLOR CHANGE: 0
BLOOD IN STOOL: 0
ANAL BLEEDING: 0
COLOR CHANGE: 0
NAUSEA: 0
EYE PAIN: 0
VOMITING: 0
ABDOMINAL PAIN: 0
EYE REDNESS: 0
TROUBLE SWALLOWING: 0
ABDOMINAL DISTENTION: 0
TROUBLE SWALLOWING: 0

## 2019-04-19 ASSESSMENT — PATIENT HEALTH QUESTIONNAIRE - PHQ9
2. FEELING DOWN, DEPRESSED OR HOPELESS: 0
1. LITTLE INTEREST OR PLEASURE IN DOING THINGS: 1
SUM OF ALL RESPONSES TO PHQ9 QUESTIONS 1 & 2: 0
SUM OF ALL RESPONSES TO PHQ9 QUESTIONS 1 & 2: 1
SUM OF ALL RESPONSES TO PHQ QUESTIONS 1-9: 0
SUM OF ALL RESPONSES TO PHQ QUESTIONS 1-9: 1
1. LITTLE INTEREST OR PLEASURE IN DOING THINGS: 0
SUM OF ALL RESPONSES TO PHQ QUESTIONS 1-9: 1
SUM OF ALL RESPONSES TO PHQ QUESTIONS 1-9: 0
2. FEELING DOWN, DEPRESSED OR HOPELESS: 0

## 2019-04-19 ASSESSMENT — PAIN SCALES - GENERAL
PAINLEVEL_OUTOF10: 0

## 2019-04-19 NOTE — PROGRESS NOTES
Visit Information    Have you changed or started any medications since your last visit including any over-the-counter medicines, vitamins, or herbal medicines? no   Are you having any side effects from any of your medications? -  no  Have you stopped taking any of your medications? Is so, why? -  no    Have you seen any other physician or provider since your last visit? Yes - Records Obtained  Have you had any other diagnostic tests since your last visit? Yes - Records Obtained  Have you been seen in the emergency room and/or had an admission to a hospital since we last saw you? Yes - Records Obtained  Have you had your routine dental cleaning in the past 6 months? no    Have you activated your [employer] account? If not, what are your barriers?  Yes     Patient Care Team:  Ofe Maza MD as PCP - General    Medical History Review  Past Medical, Family, and Social History reviewed and does not contribute to the patient presenting condition    Health Maintenance   Topic Date Due    DTaP/Tdap/Td vaccine (1 - Tdap) 03/14/2020 (Originally 1/12/1948)    Shingles Vaccine (1 of 2) 03/31/2020 (Originally 1/12/1979)    Potassium monitoring  04/16/2020    Creatinine monitoring  04/16/2020    Flu vaccine  Completed    Pneumococcal 65+ years Vaccine  Completed

## 2019-04-19 NOTE — CONSULTS
Shakir Dela Cruz  Urology Consultation    Patient:  Nyasia Diaz  MRN: [id number]  YOB: 1929    CHIEF COMPLAINT:  Urinary retention  Recurrent UTIs    HISTORY OF PRESENT ILLNESS:   The patient is a 80 y.o. female who presents with Physical as urinary tract infection, chronic retention,  On intermittent catheterization performed by her . .    The patient has had 2 visits to the emergency room,, following which she ended up being admitted. Patient has a neurogenic bladder requiring intermittent catheterization which has been performed for quite sometime. Presently on IV antibiotic ,, patient unable to answer questions appropriately but the patient's  is present . The  is caregiver    Patient's old records, notes and chart reviewed and summarized above.     Past Medical History:    Past Medical History:   Diagnosis Date    Arthritis     CAD (coronary artery disease)     CHF (congestive heart failure) (HCC)     Dementia     Dyslipidemia     GERD (gastroesophageal reflux disease)     Hypertension     Osteoporosis     Renal insufficiency     Urinary incontinence        Past Surgical History:    Past Surgical History:   Procedure Laterality Date    APPENDECTOMY      CARDIAC CATHETERIZATION  4-    CORONARY ARTERY BYPASS GRAFT N/A 2003    x2 with mitro valve repair    DILATION AND CURETTAGE OF UTERUS      EYE SURGERY Right     tear duct    HERNIA REPAIR      HYSTERECTOMY       Previous  surgery: cystoscopy   Medications:    Scheduled Meds:   [START ON 4/20/2019] pantoprazole  40 mg Oral QAM AC    piperacillin-tazobactam  3.375 g Intravenous Q8H    [START ON 4/20/2019] aspirin  81 mg Oral Daily    chlorthalidone  12.5 mg Oral Daily    donepezil  10 mg Oral Nightly    lactobacillus  2 tablet Oral Daily    latanoprost  1 drop Both Eyes Nightly    memantine  10 mg Oral BID    ranolazine  500 mg Oral Daily     Continuous Infusions:   sodium Social History Narrative    Not on file       Family History:    Family History   Problem Relation Age of Onset    Diabetes Mother     Cancer Father     Anemia Father      Previous Urologic Family history: none  REVIEW OF SYSTEMS:  Constitutional: negative  Eyes: negative  Respiratory: negative  Cardiovascular: negative  Gastrointestinal: negative  Genitourinary: see HPI  Musculoskeletal: negative  Skin: negative   Neurological: negative  Hematological/Lymphatic: negative  Psychological: Dementia and confusion    Physical Exam:      This a 80 y.o. female   Patient Vitals for the past 24 hrs:   BP Temp Temp src Pulse Resp SpO2 Height Weight   04/19/19 1112 (!) 147/70 97.2 °F (36.2 °C) Oral 90 18 96 % 5' 4\" (1.626 m) 141 lb 12.8 oz (64.3 kg)     Constitutional: Patient in no acute distress. Neuro: Alert and oriented to person, place and time. Psych: mood and affect normal  HEENT negative  Lungs: Respiratory effort is normal  Cardiovascular: Normal peripheral pulses  Abdomen: Soft, non-tender, non-distended with no CVA, flank pain or hepatosplenomegaly. No hernias. Kidneys normal.  Lymphatics: No palpable lymphadenopathy. The Briones catheter was inserted upon admission The urine is now clear  Bladder non-tender and not distended.       LABS:   Recent Labs     04/19/19  1234   WBC 6.4   HGB 14.5   HCT 43.0   MCV 92.9        Recent Labs     04/19/19  1234      K 3.6*      CO2 27   BUN 15   CREATININE 0.95*       Additional Lab/culture results:    Urinalysis:   Recent Labs     04/19/19  1400   COLORU YELLOW   PHUR 6.0   WBCUA TOO NUMEROUS TO COUNT   RBCUA 2 TO 5   MUCUS NOT REPORTED   TRICHOMONAS NOT REPORTED   YEAST NOT REPORTED   BACTERIA MODERATE*   SPECGRAV 1.015   LEUKOCYTESUR MOD*   UROBILINOGEN Normal   BILIRUBINUR NEGATIVE        -----------------------------------------------------------------  Imaging Results:      Assessment and Plan   Impression: Urinary retention neurogenic bladder Recurrent UTI   Patient Active Problem List   Diagnosis    Renal insufficiency    GERD (gastroesophageal reflux disease)    Urinary incontinence    Osteoporosis    Abnormal cardiovascular stress test    Pulmonary hypertension due to mitral valve disease ([facility]    Hx of mitral valve repair    S/P CABG x 2    Right HF (heart failure) secondary to left HF (heart failure) ([address]    Acute right-sided CHF (congestive heart failure) ([facility])    Acute coronary syndrome ([facility])    Drug allergy, multiple    Unstable angina ([facility]    Essential hypertension    Vascular dementia without behavioral disturbance    Dyslipidemia    Urinary bladder disorder    History of recurrent UTI (urinary tract infection)    Angina pectoris ([facility])    SOB (shortness of breath) on exertion    Dizziness    Weakness    GERD (gastroesophageal reflux disease)    Angina pectoris ([facility])    Pseudomonas urinary tract infection    Essential hypertension    Chronic renal failure, stage 3 (moderate) ([facility])    UTI (urinary tract infection)    Weakness       Plan: Agree with antibiotic treatment as outlined discussed with patient and  follow-up office visit next week after discharge continue with the present regimen catheter will stay indwelling for now    Kacy Wright  3:44 PM 4/19/2019

## 2019-04-19 NOTE — PROGRESS NOTES
Per [facility] approved policy, Zetia is not stocked or supplied for our inpatients. Please note that the Zetia is non-formulary and has been discontinued while inpatient. If you feel the patient needs to continue their home therapy during the inpatient stay, the patient may bring their Zetia for verification and administration pursuant to our home medication use policy. At discharge, Sophie Russian can be continued with your orders. Please contact the pharmacy with any questions or concerns. Thank you.   Jason Denton RPh/PharmD 4/19/2019 3:42 PM

## 2019-04-19 NOTE — CARE COORDINATION
Case Management Initial Discharge Plan  Tracy Hess,         Readmission Risk              Risk of Unplanned Readmission:        10             Met with:patient or spouse/SO to discuss discharge plans. Information verified: address, contacts, phone number, , insurance Yes  PCP: Mohit Martins MD  Date of last visit: 19    Insurance Provider: [employer]    Discharge Planning  Current Residence:  Private home  Living Arrangements:  Spouse/Significant Other   Home has 1 stories/4 stairs to climb  Support Systems:  Family Members  Current Services PTA:  none Agency: Previous use of [facility]  Patient able to perform ADL's:Assisted  DME in home:  Cane, walker, shower bench, self catheter supplies, Meals on Wheels Mon-Fri  DME used to aid ambulation prior to admission:   cane  DME used during admission:  TBD    Potential Assistance Needed:  N/A    Pharmacy: [facility] phone [phone]   Potential Assistance Purchasing Medications:  No  Does patient want to participate in local refill/ meds to beds program?  No    Patient agreeable to home care: Yes, if needed  Freedom of choice provided:  [address] in the past and would be agreeably to using them again. Type of Home Care Services:  Meals on Wheels  Patient expects to be discharged to:  home    Prior SNF/Rehab Placement and Facility: none  Agreeable to SNF/Rehab: No  [place] of choice provided: n/a   Evaluation: n/a    Expected Discharge date:  19  Follow Up Appointment: Best Day/ Time: Monday AM    Transportation provider:   Transportation arrangements needed for discharge: No    Discharge Plan:   Met with patient and her  to review plan of care. Due to patient's dementia  answers all the questions; pt is pleasantly confused and passive and looks to  to answer all questions.  drives and maintains household responsibiities.   He states patient self caths twice a day due to urinary retention. Significant history of multiple UTI with multiple antibiotics tried.  states he remembers having home care once for IV antibiotics at home. He would be agreeable to using [facility] again if needed. Plan:  F/U with urology and ID consult and watch for need for IV antibiotics.  prefers to take patient home due to her dementia.         Electronically signed by Todd Rios on 4/19/19 at 2:03 PM

## 2019-04-19 NOTE — PROGRESS NOTES
Subjective:      Patient ID: Lelia Melissa is a 80 y.o. female. HPI Been to the ER x 3 times in last few weeks . States was seen at [facility] on 13 th and started on cefdinir 300 mg BID but staes not any better. Appetite is poor, no N/V. Bowels are ok. Urine is very cloudy on self cath. Increased frequency and urgency every half an hour or so per patient. This has been going on for last month. First time she was given a high dose keflex 1000 mg x 5 days followed by 250 mg BID, had improved some on the high dose but sx came back. Last seen Dr Litzy Armstrong approx 3 months ago, next appt is on Tuesday. Review of Systems   Constitutional: Positive for activity change, appetite change, fatigue and unexpected weight change. HENT: Negative for dental problem, ear pain, hearing loss, postnasal drip, sinus pressure, sneezing, tinnitus, trouble swallowing and voice change. Eyes: Negative for pain, discharge, redness and visual disturbance. Respiratory: Negative for cough, chest tightness and shortness of breath. Cardiovascular: Negative for chest pain, palpitations and leg swelling. Gastrointestinal: Negative for abdominal distention, abdominal pain, anal bleeding, blood in stool, constipation, diarrhea, nausea, rectal pain and vomiting. Endocrine: Negative for cold intolerance, heat intolerance, polydipsia, polyphagia and polyuria. Genitourinary: Positive for frequency and urgency. Negative for decreased urine volume, difficulty urinating, dyspareunia, dysuria, enuresis, flank pain, genital sores, hematuria, menstrual problem, pelvic pain, vaginal bleeding and vaginal discharge. Musculoskeletal: Negative for arthralgias, back pain, gait problem, joint swelling, myalgias, neck pain and neck stiffness. Skin: Negative for color change, pallor and rash. Allergic/Immunologic: Negative for environmental allergies, food allergies and immunocompromised state.    Neurological: Negative for  cefdinir (OMNICEF) 300 MG capsule Take 1 capsule by mouth 2 times daily for 7 days 14 capsule 0    donepezil (ARICEPT) 10 MG tablet Take 1 tablet by mouth nightly 90 tablet 3    chlorthalidone (HYGROTON) 25 MG tablet 1/2 a tab twice weekly on Thursday and Sunday mornings 30 tablet 3    hydrALAZINE (APRESOLINE) 25 MG tablet TAKE 1 TABLET BY MOUTH 3 TIMES DAILY AS NEEDED (SBP >140) 90 tablet 0    memantine (NAMENDA) 10 MG tablet TAKE 1 TABLET BY MOUTH TWICE DAILY 60 tablet 5    darifenacin (ENABLEX) 7.5 MG extended release tablet TAKE 1 TABLET BY MOUTH EVERY DAY  3    ranolazine (RANEXA) 500 MG extended release tablet Take 500 mg by mouth daily      Lactobacillus (ACIDOPHILUS) CAPS capsule Take 2 capsules by mouth daily 60 capsule 2    nitroGLYCERIN (NITROSTAT) 0.4 MG SL tablet Place 1 tablet under the tongue as needed for Chest pain 25 tablet 3    ezetimibe (ZETIA) 10 MG tablet Take 1 tablet by mouth daily 30 tablet 3    Multiple Vitamins-Minerals (CENTRUM SILVER) TABS Take 1 tablet by mouth daily 30 tablet 3    omeprazole (PRILOSEC) 20 MG capsule Take 1 capsule by mouth 2 times daily (Patient taking differently: Take 20 mg by mouth Daily ) 180 capsule 1    aspirin 81 MG tablet Take 1 tablet by mouth daily 30 tablet 3    calcium carbonate 600 MG TABS tablet Take 1 tablet by mouth daily       latanoprost (XALATAN) 0.005 % ophthalmic solution Place 1 drop into both eyes nightly        No facility-administered encounter medications on file as of 4/19/2019.         Admit to [facility], ID and Urology consults, IV antibiotics, hydration    Sarita Cee MD

## 2019-04-19 NOTE — FLOWSHEET NOTE
Patient was awake and a;ert with spouse at bedside. Patient and spouse were approachable but denied any spiritual or emotional concerns. Patient reports attending [facility] in [place]. Patient appears to be coping. Patient accepted a prayer/information card for possible further follow. Patient expressed appreciation for the visit.        04/19/19 1219   Encounter Summary   Services provided to: Patient and family together   Referral/Consult From: [address] [address]   Continue Visiting   (4/19/19)   Complexity of Encounter Low   Length of Encounter 15 minutes   Spiritual Assessment Completed Yes   Routine   Type Initial   Assessment Approachable   Intervention Active listening;Explored feelings, thoughts, concerns;Explored coping resources;Nurtured hope;Sustaining presence/ Ministry of presence   Outcome Expressed gratitude

## 2019-04-19 NOTE — CONSULTS
Inpatient consult to Infectious Diseases  Consult performed by: Eirc Munoz MD  Consult ordered by: Ave Borja MD          [facility]  Initial Consult Note  Date: 4/19/2019    Impression:   1. Pseudomonas aeruginosa urinary tract infection  2. Bladder Outlet obstruction and self catheterizes twice a day at home  3. CAD status post coronary artery bypass grafting  4. Essential hypertension    Recommendations   · Continue intravenous antimicrobial therapy with Zosyn. · While the patient previously had E. coli and Proteus isolated the patient currently has pseudomonas aeruginosa which would likely explain why the symptoms were not improving. · Await the sensitivity data and adjust antimicrobial therapy accordingly    Chief complaint/reason for consultation:   Pseudomonas urinary tract infection    History of Present Illness:   Merlin Manly is a 80y.o.-year-old female who was initially admitted on 4/19/2019. Geraldo De Los Santos is seen and evaluated at bedside and the history is obtained mostly from her . The  reports that she does have some underlying dementia, pulmonary hypertension, chronic kidney disease, urinary incontinence and self catheterizes at home twice a day, coronary artery disease status post coronary artery bypass grafting ×2, history of mitral valve repair, essential hypertension. The  reports that the patient has had recurrent urinary tract infections and for at least the last month he reports that she's been dealing a urinary tract infections. She was seen at [facility] emergency department and diagnosed with a urinary tract infection for which she was started on cephalexin. She is taking this 4 times a day for 5 days and then take it 3 times a day for 10 days for a total of 15 days. The patient was then seen by the emergency department here on 4/16/19 who prescribed a different antibiotic Ceftdinir with no improvement.    The patient was seen by Dr. Blaise Wakefield in the office today and again continued dysuria, frequency and pelvic pain. Has had some generalized weakness as well has loss of appetite. Cultures from 4/16/19  Growing pseudomonas aeruginosa and patient has been admitted started on Zosyn and I was asked to evaluate and help with antibiotic choice. I have personally reviewed the past medical history, past surgical history, medications, social history, and family history, and I have updated the database accordingly.   Past Medical History:     Past Medical History:   Diagnosis Date    Arthritis     CAD (coronary artery disease)     CHF (congestive heart failure) (HCC)     Dementia     Dyslipidemia     GERD (gastroesophageal reflux disease)     Hypertension     Osteoporosis     Renal insufficiency     Urinary incontinence      Past Surgical  History:     Past Surgical History:   Procedure Laterality Date    APPENDECTOMY      CARDIAC CATHETERIZATION  4-    CORONARY ARTERY BYPASS GRAFT N/A 2003    x2 with mitro valve repair    DILATION AND CURETTAGE OF UTERUS      EYE SURGERY Right     tear duct    HERNIA REPAIR      HYSTERECTOMY       Medications:      [START ON 4/20/2019] pantoprazole  40 mg Oral QAM AC    piperacillin-tazobactam  3.375 g Intravenous Q8H    [START ON 4/20/2019] aspirin  81 mg Oral Daily    chlorthalidone  12.5 mg Oral Daily    donepezil  10 mg Oral Nightly    lactobacillus  2 tablet Oral Daily    latanoprost  1 drop Both Eyes Nightly    memantine  10 mg Oral BID    ranolazine  500 mg Oral Daily    trospium  20 mg Oral BID AC     Social History:     Social History     Socioeconomic History    Marital status:      Spouse name: Chica Farley    Number of children: 2    Years of education: 12    Highest education level: Not on file   Occupational History    Occupation: RETIRED   Social Needs    Financial resource strain: Not on file    Food insecurity:     Worry: Not on file     Inability: Not on file   [employer] needs:     Medical: Not on file     Non-medical: Not on file   Tobacco Use    Smoking status: Never Smoker    Smokeless tobacco: Never Used   Substance and Sexual Activity    Alcohol use: No     Comment: very little    Drug use: No    Sexual activity: Not Currently   Lifestyle    Physical activity:     Days per week: Not on file     Minutes per session: Not on file    Stress: Not on file   Relationships    Social connections:     Talks on phone: Not on file     Gets together: Not on file     Attends Christianity service: Not on file     Active member of club or organization: Not on file     Attends meetings of clubs or organizations: Not on file     Relationship status: Not on file    Intimate partner violence:     Fear of current or ex partner: Not on file     Emotionally abused: Not on file     Physically abused: Not on file     Forced sexual activity: Not on file   Other Topics Concern    Not on file   Social History Narrative    Not on file     Family History:     Family History   Problem Relation Age of Onset    Diabetes Mother     Cancer Father     Anemia Father       Allergies:   Latex; Actonel [risedronate sodium]; Adhesive tape; Aminophylline; Amlodipine; Celebrex [celecoxib]; Chlorthalidone; Ciprofloxacin; Codeine; Colesevelam hcl; Darvocet a500 [propoxyphene n-acetaminophen]; Demerol hcl [meperidine]; Lisinopril; Lovastatin; Macrobid [nitrofurantoin monohyd macro]; Metoprolol; Nitrofuran derivatives; Nitrofurantoin; Propoxyphene; Ranitidine; Ranitidine hcl; Simvastatin; Tramadol hcl; Tramadol hcl; Trimethadione; Trimethoprim; Ultram [tramadol]; Vioxx [rofecoxib]; Naproxen; and Sulfa antibiotics     Review of Systems:   General: The patient has had some fatigue/weakness. No subjective fevers  Eyes: No double vision or blurry vision. ENT: No sore throat or runny nose. Cardiovascular: No chest pain or palpitations. Lung: No shortness of breath or cough.   Abdomen: She's had some abdominal pain but no nausea vomiting or diarrhea  Genitourinary: She's had dysuria and frequency as well as some bladder pain  Musculoskeletal: No muscle aches or pains. Hematologic: No bleeding or bruising. Neurologic: No headache, weakness, numbness, or tingling. Physical Examination :   BP (!) 147/70   Pulse 90   Temp 97.2 °F (36.2 °C) (Oral)   Resp 18   Ht 5' 4\" (1.626 m)   Wt 141 lb 12.8 oz (64.3 kg)   SpO2 96%   BMI 24.34 kg/m²     Temperature Range: Temp: 97.2 °F (36.2 °C) Temp  Av.3 °F (36.3 °C)  Min: 97.2 °F (36.2 °C)  Max: 97.3 °F (36.3 °C)  General Appearance: Awake, alert, and in no apparent distress  Head: Normocephalic, without obvious abnormality, atraumatic  Eyes: Pupils equal, round, reactive, to light and accommodation; extraocular movements intact; sclera anicteric; conjunctivae pink  ENT: Oropharynx clear, without erythema, exudate, or thrush. Neck: Supple, without lymphadenopathy. Pulmonary/Chest: Clear to auscultation, without wheezes, rales, or rhonchi  Cardiovascular: Regular rate and rhythm without murmurs, rubs, or gallops. Abdomen: Positive bowel sounds in all 4 quadrants mild tenderness to palpation in the lower abdomen, no organomegaly or rebound tenderness appreciated  Extremities: No cyanosis, clubbing, edema, or effusions. Neurologic: No gross sensory or motor deficits. Skin: Warm and dry with no rash.     Medical Decision Making:   I have independently reviewed/ordered the following labs:  CBC with Differential:   Recent Labs     19  1234   WBC 6.4   HGB 14.5   HCT 43.0      LYMPHOPCT 23*   MONOPCT 9*     BMP:   Recent Labs     19  1234      K 3.6*      CO2 27   BUN 15   CREATININE 0.95*     Hepatic Function Panel:   Recent Labs     19  1234   PROT 7.3   LABALBU 4.1   BILIDIR 0.11   IBILI 0.30   BILITOT 0.41   ALKPHOS 66   ALT 13   AST 17     No results found for: CRP  No results found for: SEDRATE    No results for input(s): PROCAL in the last 72 hours. Color, UA YELLOW  YELLOW Final 04/16/2019 10:30 AM [facility]   Turbidity UA TURBIDAbnormal   CLEAR Final 04/16/2019 10:30 AM [facility]   Glucose, Ur NEGATIVE  NEGATIVE Final 04/16/2019 10:30 AM [facility]   Bilirubin Urine NEGATIVE  NEGATIVE Final 04/16/2019 10:30 AM [facility]   Ketones, Urine NEGATIVE  NEGATIVE Final 04/16/2019 10:30 AM [facility]   Specific [place], UA 1.020  1.005 - 1.030 Final 04/16/2019 10:30 AM [facility]   Urine Hgb 1+Abnormal   NEGATIVE Final 04/16/2019 10:30 AM [facility]   pH, UA 6.5  5.0 - 8.0 Final 04/16/2019 10:30 AM [facility]   Protein, UA 2+Abnormal   NEGATIVE Final 04/16/2019 10:30 AM [facility]   Urobilinogen, Urine Normal  Normal Final 04/16/2019 10:30 AM [facility]   Nitrite, Urine NEGATIVE  NEGATIVE Final 04/16/2019 10:30 AM [facility]   Leukocyte Esterase, Urine LARGEAbnormal   NEGATIVE Final 04/16/2019 10:30 AM [facility] L     WBC, UA  0 - 5 /HPF Final 04/16/2019 10:30 AM [facility]   TOO NUMEROUS TO COUNT    RBC, UA 5 TO 10  0 - 2 /HPF Final 04/16/2019 10:30 AM [facility]       Imaging Studies:   RETROPERITONEAL ULTRASOUND OF THE KIDNEYS AND URINARY BLADDER 4/19/2019   FINDINGS:   1. Collapsed urinary bladder with catheter in place. 2. No hydronephrosis. 3. Simple midpole left renal hilum or parapelvic cyst measuring up to 1.5 cm.        Cultures:     URINE,STRAIGHT CATHETER    Special Requests 04/16/2019 10:30 AM [facility]   NOT REPORTED    Culture Abnormal  04/16/2019 10:30 AM [address]   50 to 100,000 CFU/ML        Urine culture clean catch [[id number]] Collected: 04/19/19 1400   Order Status: No result Specimen: Urine, clean catch Updated: 04/19/19 1440   CULTURE BLOOD #1 [[id number]] Collected: 04/19/19 1234   Order Status: Sent Specimen: Blood Updated: 04/19/19 1429       Thank you for allowing us to participate in the care of this patient. Please call with questions. Electronically signed by Abe Barlow MD on 4/19/2019 at 4:02 PM      [facility]  Abe Barlow MD  [employer]  OFFICE: [phone]  CELL:     [phone]    This note is created with the assistance of a speech recognition program.  While intending to generate a document that actually reflects the content of the visit, the document can still have some errors including those of syntax and sound a like substitutions which may escape proof reading. It such instances, actual meaning can be extrapolated by contextual diversion.

## 2019-04-19 NOTE — PROGRESS NOTES
Transitions of Care Pharmacy Service   Medication Review    The patient's list of current home medications has been reviewed. Source(s) of information: Patient's , surescripts General Combs)    Based on information provided by the above source(s), I have updated the patient's home med list as described below. Please review the ACTION REQUESTED BY PHYSICIAN section of this note below for any discrepancies on current hospital orders. I changed or updated the following medications on the patient's home medication list:  Discontinued Calcium carbonate 600mg QD  Ranexa 500mg QD     Adjusted   Prilosec adjusted from 20mg QD to BID       PHYSICIAN ACTION REQUESTED  Discrepancies on current hospital orders that need to be addressed by a physician:    Medication Action Requested     Ranexa     Chlorthalidone      Pt no longer takes, please d/c    Pt takes 12.5mg on thurs/sun, please adjust         Please feel free to call me with any questions about this encounter. Thank you.     Jacey Hodge [facility]   Transitions of Care Pharmacy Service  Phone:  [phone]  Fax: [phone]      Electronically signed by Jacey Hodge [facility] on 4/19/2019 at 3:39 PM     Medications Prior to Admission: chlorthalidone (HYGROTON) 25 MG tablet, Take 12.5 mg by mouth Twice a Week Indications: on thursday and sunday mornings  darifenacin (ENABLEX) 7.5 MG extended release tablet, Take 7.5 mg by mouth daily  hydrALAZINE (APRESOLINE) 25 MG tablet, Take 25 mg by mouth 3 times daily as needed (SBP > 140)  memantine (NAMENDA) 10 MG tablet, Take 10 mg by mouth 2 times daily  cefdinir (OMNICEF) 300 MG capsule, Take 1 capsule by mouth 2 times daily for 7 days  donepezil (ARICEPT) 10 MG tablet, Take 1 tablet by mouth nightly  Lactobacillus (ACIDOPHILUS) CAPS capsule, Take 2 capsules by mouth daily  nitroGLYCERIN (NITROSTAT) 0.4 MG SL tablet, Place 1 tablet under the tongue as needed for Chest pain  ezetimibe (ZETIA) 10 MG tablet, Take 1 tablet by mouth daily  Multiple Vitamins-Minerals (CENTRUM SILVER) TABS, Take 1 tablet by mouth daily  omeprazole (PRILOSEC) 20 MG capsule, Take 1 capsule by mouth 2 times daily  aspirin 81 MG tablet, Take 1 tablet by mouth daily  latanoprost (XALATAN) 0.005 % ophthalmic solution, Place 1 drop into both eyes nightly

## 2019-04-19 NOTE — H&P
Ja Peterson is an 80 y.o.  female. with H/O recurrent UTI as well as multiple antibiotic allergies came to my office today with CC of fatigue,loss of appetite, weakness as well as urgency of urination. She had Been to the ER x 3 times in last few weeks . States was seen at [facility] on 13 th and started on cefdinir 300 mg BID but states not any better. Appetite is poor, no N/V. Bowels are ok. Urine is very cloudy on self cath. Increased frequency and urgency every half an hour or so per patient. This has been going on for last month. First time she was in ER , she was given a high dose keflex 1000 mg x 5 days followed by 250 mg BID, had improved some on the high dose but sx came back.   Last seen Dr Nicky Maldonado approx 3 months ago, next appt is on Tuesday.       Past Medical History:   Diagnosis Date    Arthritis     CAD (coronary artery disease)     CHF (congestive heart failure) ([facility] [address]     Dementia     Dyslipidemia     GERD (gastroesophageal reflux disease)     Hypertension     Osteoporosis     Renal insufficiency     Urinary incontinence      Past Surgical History:   Procedure Laterality Date    APPENDECTOMY      CARDIAC CATHETERIZATION  4-    CORONARY ARTERY BYPASS GRAFT N/A 2003    x2 with mitro valve repair    DILATION AND CURETTAGE OF UTERUS      EYE SURGERY Right     tear duct    HERNIA REPAIR      HYSTERECTOMY           Social History     Socioeconomic History    Marital status:      Spouse name: Cherylene Sells Number of children: 2    Years of education: 15    Highest education level: Not on file   Occupational History    Occupation: RETIRED   Social Needs    Financial resource strain: Not on file    Food insecurity:     Worry: Not on file     Inability: Not on file   [employer] needs:     Medical: Not on file     Non-medical: Not on file   Tobacco Use    Smoking status: Never Smoker    Smokeless tobacco: Never Used   Substance and Sexual Activity  Alcohol use: No     Comment: very little    Drug use: No    Sexual activity: Not Currently   Lifestyle    Physical activity:     Days per week: Not on file     Minutes per session: Not on file    Stress: Not on file   Relationships    Social connections:     Talks on phone: Not on file     Gets together: Not on file     Attends Religion service: Not on file     Active member of club or organization: Not on file     Attends meetings of clubs or organizations: Not on file     Relationship status: Not on file    Intimate partner violence:     Fear of current or ex partner: Not on file     Emotionally abused: Not on file     Physically abused: Not on file     Forced sexual activity: Not on file   Other Topics Concern    Not on file   Social History Narrative    Not on file     Family History   Problem Relation Age of Onset    Diabetes Mother     Cancer Father     Anemia Father      Allergies:    Allergies   Allergen Reactions    Latex Itching    Actonel [Risedronate Sodium] Other (See Comments)     abdo pain      Adhesive Tape Other (See Comments)     band-aid  band-aid    Aminophylline Itching     Other reaction(s): Unknown    Amlodipine     Celebrex [Celecoxib] Itching     Other reaction(s): Unknown  Other reaction(s): Unknown    Chlorthalidone     Ciprofloxacin      Pain muscles      Codeine Nausea Only     Other reaction(s): Unknown  headache    Colesevelam Hcl     Darvocet A500 [Propoxyphene N-Acetaminophen] Nausea Only     headache    Demerol Hcl [Meperidine] Itching     Other reaction(s): Unknown  Flash backs- and did not help pain  Other reaction(s): Unknown  Flash backs- and did not help pain    Lisinopril     Lovastatin     Macrobid [Nitrofurantoin Monohyd Macro] Hives    Metoprolol     Nitrofuran Derivatives     Nitrofurantoin Hives     Other reaction(s): Unknown    Propoxyphene Nausea Only     headache    Ranitidine     Ranitidine Hcl      pain    Simvastatin     Tramadol Hcl      Other reaction(s): Unknown    Tramadol Hcl      Other reaction(s): Unknown    Trimethadione     Trimethoprim     Ultram [Tramadol] Itching     Other reaction(s): Unknown    Vioxx [Rofecoxib]     Naproxen Rash and Itching       rash    Sulfa Antibiotics Rash and Itching     Other reaction(s): Unknown       Active Problems:    Pseudomonas urinary tract infection  Resolved Problems:    * No resolved hospital problems. *    Blood pressure (!) 147/70, pulse 90, temperature 97.2 °F (36.2 °C), temperature source Oral, resp. rate 18, height 5' 4\" (1.626 m), weight 141 lb 12.8 oz (64.3 kg), SpO2 96 %, not currently breastfeeding. Review of Systems   Constitutional: Positive for activity change, appetite change and fatigue. HENT: Negative for dental problem, ear pain, hearing loss, postnasal drip, sinus pressure, sneezing, tinnitus, trouble swallowing and voice change. Eyes: Negative for pain, discharge, redness and visual disturbance. Respiratory: Negative for cough, chest tightness and shortness of breath. Cardiovascular: Negative for chest pain, palpitations and leg swelling. Gastrointestinal: Negative for abdominal distention, abdominal pain, anal bleeding, blood in stool, constipation, diarrhea, nausea, rectal pain and vomiting. Endocrine: Negative for cold intolerance, heat intolerance, polydipsia, polyphagia and polyuria. Genitourinary: Positive for urgency. Negative for decreased urine volume, difficulty urinating, dyspareunia, dysuria, enuresis, flank pain, frequency, genital sores, hematuria, menstrual problem, pelvic pain, vaginal bleeding and vaginal discharge. Musculoskeletal: Negative for arthralgias, back pain, gait problem, joint swelling, myalgias, neck pain and neck stiffness. Skin: Negative for color change, pallor and rash. Allergic/Immunologic: Negative for environmental allergies, food allergies and immunocompromised state.    Neurological: Positive for weakness. Negative for dizziness, tremors, seizures, syncope, facial asymmetry, speech difficulty, light-headedness, numbness and headaches. Hematological: Negative for adenopathy. Does not bruise/bleed easily. Psychiatric/Behavioral: Negative for agitation, behavioral problems, confusion, decreased concentration, sleep disturbance and suicidal ideas. The patient is not nervous/anxious. Physical Exam   Constitutional: She is oriented to person, place, and time. No distress. HENT:   Head: Normocephalic and atraumatic. Right Ear: External ear normal.   Left Ear: External ear normal.   Eyes: Pupils are equal, round, and reactive to light. Conjunctivae and EOM are normal.   Neck: Normal range of motion. No tracheal deviation present. No thyromegaly present. Cardiovascular: Normal rate, regular rhythm and intact distal pulses. Exam reveals no gallop and no friction rub. No murmur heard. Pulmonary/Chest: No stridor. No respiratory distress. She has no wheezes. She has no rales. She exhibits no tenderness. Abdominal: Soft. Bowel sounds are normal. She exhibits no distension. There is no tenderness. There is no rebound. Musculoskeletal: Normal range of motion. Lymphadenopathy:     She has no cervical adenopathy. Neurological: She is alert and oriented to person, place, and time. She displays normal reflexes. No cranial nerve deficit. She exhibits normal muscle tone. Skin: Skin is warm. No rash noted. No erythema. No pallor.        Assessment:  Problem List Items Addressed This Visit     None      Pseudomonas UTI  Weakness  HTN  CAD  H/O Recurrent UTI  Bladder outlet obstruction with H/O daily self catheterization  H/O Multiple drug allergies    Plan:  Orders Placed This Encounter   Procedures    CULTURE BLOOD #1    CBC WITH AUTO DIFFERENTIAL    COMP METABOLIC W/ BILI PROFILE    Urinalysis Reflex to Culture    DIET CARDIAC;    Insert indwelling urinary catheter    Discontinue indwelling urinary catheter when documented indications no longer apply per hospital policy   [facility] privileges with assistance    Maintain sequential compression device    Inpatient consult to Infectious Diseases    Inpatient consult to Urology    PATIENT STATUS (DIRECT) Inpatient       [unfilled]    Dean Mejia MD  4/19/2019

## 2019-04-20 PROCEDURE — 51702 INSERT TEMP BLADDER CATH: CPT

## 2019-04-20 PROCEDURE — 6370000000 HC RX 637 (ALT 250 FOR IP): Performed by: FAMILY MEDICINE

## 2019-04-20 PROCEDURE — 97530 THERAPEUTIC ACTIVITIES: CPT

## 2019-04-20 PROCEDURE — 99233 SBSQ HOSP IP/OBS HIGH 50: CPT | Performed by: FAMILY MEDICINE

## 2019-04-20 PROCEDURE — 97163 PT EVAL HIGH COMPLEX 45 MIN: CPT

## 2019-04-20 PROCEDURE — 1200000000 HC SEMI PRIVATE

## 2019-04-20 PROCEDURE — 2500000003 HC RX 250 WO HCPCS: Performed by: FAMILY MEDICINE

## 2019-04-20 PROCEDURE — 97116 GAIT TRAINING THERAPY: CPT

## 2019-04-20 PROCEDURE — 2580000003 HC RX 258: Performed by: INTERNAL MEDICINE

## 2019-04-20 PROCEDURE — 99232 SBSQ HOSP IP/OBS MODERATE 35: CPT | Performed by: INTERNAL MEDICINE

## 2019-04-20 PROCEDURE — 6360000002 HC RX W HCPCS: Performed by: INTERNAL MEDICINE

## 2019-04-20 PROCEDURE — 2580000003 HC RX 258: Performed by: FAMILY MEDICINE

## 2019-04-20 RX ORDER — POTASSIUM CHLORIDE 20 MEQ/1
20 TABLET, EXTENDED RELEASE ORAL 2 TIMES DAILY WITH MEALS
Status: COMPLETED | OUTPATIENT
Start: 2019-04-20 | End: 2019-04-21

## 2019-04-20 RX ORDER — CHLORTHALIDONE 25 MG/1
12.5 TABLET ORAL
Status: DISCONTINUED | OUTPATIENT
Start: 2019-04-21 | End: 2019-04-21 | Stop reason: HOSPADM

## 2019-04-20 RX ORDER — LORAZEPAM 0.5 MG/1
0.25 TABLET ORAL 4 TIMES DAILY
Status: DISCONTINUED | OUTPATIENT
Start: 2019-04-20 | End: 2019-04-21 | Stop reason: HOSPADM

## 2019-04-20 RX ADMIN — TROSPIUM CHLORIDE 20 MG: 20 TABLET, FILM COATED ORAL at 17:11

## 2019-04-20 RX ADMIN — DONEPEZIL HYDROCHLORIDE 10 MG: 10 TABLET, FILM COATED ORAL at 22:20

## 2019-04-20 RX ADMIN — PANTOPRAZOLE SODIUM 40 MG: 40 TABLET, DELAYED RELEASE ORAL at 05:16

## 2019-04-20 RX ADMIN — LORAZEPAM 0.25 MG: 0.5 TABLET ORAL at 15:39

## 2019-04-20 RX ADMIN — MEMANTINE HYDROCHLORIDE 10 MG: 10 TABLET, FILM COATED ORAL at 22:20

## 2019-04-20 RX ADMIN — TAZOBACTAM SODIUM AND PIPERACILLIN SODIUM 3.38 G: 375; 3 INJECTION, SOLUTION INTRAVENOUS at 04:46

## 2019-04-20 RX ADMIN — PROBIOTIC PRODUCT - TAB 2 TABLET: TAB at 09:48

## 2019-04-20 RX ADMIN — CEFEPIME HYDROCHLORIDE 2 G: 2 INJECTION, POWDER, FOR SOLUTION INTRAVENOUS at 15:09

## 2019-04-20 RX ADMIN — SODIUM CHLORIDE: 9 INJECTION, SOLUTION INTRAVENOUS at 09:48

## 2019-04-20 RX ADMIN — LORAZEPAM 0.25 MG: 0.5 TABLET ORAL at 22:20

## 2019-04-20 RX ADMIN — ASPIRIN 81 MG: 81 TABLET, COATED ORAL at 09:48

## 2019-04-20 RX ADMIN — TROSPIUM CHLORIDE 20 MG: 20 TABLET, FILM COATED ORAL at 05:16

## 2019-04-20 RX ADMIN — LATANOPROST 1 DROP: 50 SOLUTION OPHTHALMIC at 22:27

## 2019-04-20 RX ADMIN — POTASSIUM CHLORIDE 20 MEQ: 20 TABLET, EXTENDED RELEASE ORAL at 17:11

## 2019-04-20 RX ADMIN — MEMANTINE HYDROCHLORIDE 10 MG: 10 TABLET, FILM COATED ORAL at 09:48

## 2019-04-20 ASSESSMENT — PAIN SCALES - GENERAL
PAINLEVEL_OUTOF10: 3
PAINLEVEL_OUTOF10: 3
PAINLEVEL_OUTOF10: 0

## 2019-04-20 ASSESSMENT — ENCOUNTER SYMPTOMS
NAUSEA: 0
COUGH: 0
DIARRHEA: 0
SHORTNESS OF BREATH: 0
VOMITING: 0

## 2019-04-20 NOTE — PLAN OF CARE
Problem: Falls - Risk of:  Goal: Will remain free from falls  Description  Will remain free from falls  Outcome: Ongoing  Note:   Siderails up x 2  Hourly rounding  Call light in reach  Instructed to call for assist before attempting out of bed. Remains free from falls and accidental injury at this time   Floor free from obstacles  Bed is locked and in lowest position  Adequate lighting provided  Bed alarm on, [place] star and Stay with Me signs posted  Tele-sitter in place.   Goal: Absence of physical injury  Description  Absence of physical injury  Outcome: Ongoing

## 2019-04-20 NOTE — PROGRESS NOTES
Nutrition Assessment    Type and Reason for Visit: Initial, Positive Nutrition Screen(MST = 2)    Nutrition Recommendations: 1. Suggest continuing with Cardiac Diet. 2. Consider adding high calorie oral nutrition supplement (Ensure Enlive) 3x/d per /pt request.    Nutrition Assessment: Patient nutritionally compromised as evidenced by 19 lb (11.8%) weight loss in 4 months. At risk for further nutritional compromise related to ongoing poor appetite/decreased PO intake. Upon visit, writer observed patient eating lunch (bbq chicken breast, mashed potatoes, strawberry shortcake); she reported that her appetite is \"getting better,\" but would like to add high calorie oral nutrition supplement for added kcal/protein. Suggest continuing with Cardiac Diet. Consider adding high calorie oral nutrition supplement (Ensure Enlive) 3x/d. Malnutrition Assessment:  · Malnutrition Status: At risk for malnutrition  · Context: Acute illness or injury  · Findings of the 6 clinical characteristics of malnutrition (Minimum of 2 out of 6 clinical characteristics is required to make the diagnosis of moderate or severe Protein Calorie Malnutrition based on AND/ASPEN Guidelines):  1. Energy Intake-Less than or equal to 75% of estimated energy requirement, Greater than or equal to 3 months    2. Weight Loss-10% loss or greater, (4 months)  3. Fat Loss-No significant subcutaneous fat loss  4. Muscle Loss-No significant muscle mass loss  5. Fluid Accumulation-No significant fluid accumulation  6.  Strength-Not measured    Nutrition Risk Level:  Moderate    Nutrient Needs:  · Estimated Daily Total Kcal: 6158-1103 (23-25 kcal/kg)  · Estimated Daily Protein (g): 65-75 (1.0-1.2 g/kg)  · Estimated Daily Total Fluid (ml/day): 9126-1200 (25-30 ml/kg)    Nutrition Diagnosis:   · Problem: Unintended weight loss  · Etiology: related to Insufficient energy/nutrient consumption, Cognitive or neurological impairment     Signs and symptoms: as evidenced by Weight loss, Diet history of poor intake, Patient report of    Objective Information:  · Nutrition-Focused Physical Findings: GI: soft, last BM 4/18, no flatus, active bowel sounds; PV: WDL; Skin: WDL  · Wound Type: None  · Current Nutrition Therapies:  · Oral Diet Orders: Cardiac   · Oral Diet intake: Unable to assess  · Oral Nutrition Supplement (ONS) Orders: None  · ONS intake:    · Anthropometric Measures:  · Ht: 5' 4\" (162.6 cm)   · Current Body Wt: 141 lb 12.8 oz (64.3 kg)  · Admission Body Wt: 141 lb 12.8 oz (64.3 kg)  · Usual Body Wt: 160 lb (72.6 kg)(12/21/18)  · % Weight Change:  11.8% loss in 6 months  · Ideal Body Wt: 120 lb (54.4 kg), % Ideal Body 118%  · BMI Classification: BMI 18.5 - 24.9 Normal Weight    Nutrition Interventions:   Continue current diet, Start ONS  Continued Inpatient Monitoring, Coordination of Care    Nutrition Evaluation:   · Evaluation: Goals set   · Goals: Patient able to tolerate PO intake >75% of estimated kcal/protein needs     · Monitoring: Nutrition Progression, Meal Intake, Diet Tolerance, Skin Integrity, I&O, Mental Status/Confusion, Weight, Pertinent Labs, Monitor Bowel Function      Electronically signed by Pastor Bruce RD, LD on 4/20/19 at 12:15 PM    Contact Number: [phone]

## 2019-04-20 NOTE — PROGRESS NOTES
Abdirahman Hernandez is a 80 y.o. female patient.     Current Facility-Administered Medications   Medication Dose Route Frequency Provider Last Rate Last Dose    cefepime (MAXIPIME) 2 g IVPB minibag  2 g Intravenous Q24H Millicent Frias MD        LORazepam (ATIVAN) tablet 0.25 mg  0.25 mg Oral 4x Daily Belinda Crespo MD        0.9 % sodium chloride infusion   Intravenous Continuous Belinda Crespo MD 50 mL/hr at 04/20/19 0948      pantoprazole (PROTONIX) tablet 40 mg  40 mg Oral QAM AC Belinda Crespo MD   40 mg at 04/20/19 0516    aspirin EC tablet 81 mg  81 mg Oral Daily Belinda Crespo MD   81 mg at 04/20/19 9104    chlorthalidone (HYGROTON) tablet 12.5 mg  12.5 mg Oral Daily Belinda Crespo MD        donepezil (ARICEPT) tablet 10 mg  10 mg Oral Nightly Belinda Crespo MD   10 mg at 04/19/19 2054    hydrALAZINE (APRESOLINE) tablet 25 mg  25 mg Oral TID PRN Belinda Crespo MD   25 mg at 04/19/19 2059    lactobacillus (BACID) tablet 2 tablet  2 tablet Oral Daily Belinda Crespo MD   2 tablet at 04/20/19 0948    latanoprost (XALATAN) 0.005 % ophthalmic solution 1 drop  1 drop Both Eyes Nightly Belinda Crespo MD   1 drop at 04/19/19 2059    memantine (NAMENDA) tablet 10 mg  10 mg Oral BID Belinda Crespo MD   10 mg at 04/20/19 0948    nitroGLYCERIN (NITROSTAT) SL tablet 0.4 mg  0.4 mg Sublingual PRN Belinda Crespo MD        ranolazine [facility]) extended release tablet 500 mg  500 mg Oral Daily Belinda Crespo MD        [facility]) tablet 20 mg  20 mg Oral BID AC Belinda Crespo MD   20 mg at 04/20/19 5925     Allergies   Allergen Reactions    Latex Itching    Actonel [Risedronate Sodium] Other (See Comments)     abdo pain      Adhesive Tape Other (See Comments)     band-aid  band-aid    Aminophylline Itching     Other reaction(s): Unknown    Amlodipine     Celebrex [Celecoxib] Itching     Other reaction(s): Unknown  Other reaction(s): Unknown    Chlorthalidone     Ciprofloxacin Objective:  General Appearance:  Comfortable, well-appearing, in no acute distress and not in pain (does have episodes of confusion). Vital signs: (most recent): Blood pressure (!) 145/56, pulse 83, temperature 97.9 °F (36.6 °C), temperature source Oral, resp. rate 16, height 5' 4\" (1.626 m), weight 141 lb 12.8 oz (64.3 kg), SpO2 97 %, not currently breastfeeding. Vital signs are normal.  No fever. Output: Producing urine. HEENT: Normal HEENT exam.    Lungs:  Normal effort and normal respiratory rate. Breath sounds clear to auscultation. She is not in respiratory distress. No stridor. No decreased breath sounds, wheezes or rhonchi. Heart: Normal rate. Regular rhythm. S1 normal and S2 normal.    Chest: Symmetric chest wall expansion. No chest wall tenderness. Abdomen: Abdomen is soft. Bowel sounds are normal.   There is no abdominal tenderness. Extremities: Normal range of motion. Pulses: Distal pulses are intact. Neurological: Patient is alert and oriented to person, place and time. Pupils:  Pupils are equal, round, and reactive to light. Skin:  Warm and dry. Assessment:    Condition: In stable condition. Improving. (Pseudomonas UTI  Dementia with increased confusion  Weakness- improving  Hypokalemia  Recurrent UTI  Multiple antibiotic allergies  CKD  GERD  Dyslipidemia  HTN  CAD  ). Plan:   Ad roddy activity and per physical therapy. Start/continue incentive spirometry. Consults: ID and urology. Restricted diet. Administer medications as ordered. (Antibiotic switched to IV cefapime as per advice of ID  Continue gentle diuresis  Home meds as reconciled  Replace K orally x 2 doses. PT eval and management  Oral ativan PRN per request of  due to confusion and trying to get OOB.).        Wilfrid Abad MD  4/20/2019

## 2019-04-20 NOTE — CONSULTS
frequency and pelvic pain. Has had some generalized weakness as well has loss of appetite. Cultures from 4/16/19  Growing pseudomonas aeruginosa and patient has been admitted started on Zosyn and I was asked to evaluate and help with antibiotic choice. CURRENT EVALUATION :4/20/2019    Afebrile  VS stable   feeling better  Cultures with Pseudomonas sensitive to cefepime, resistant to cipro    Switched to cefepime to decrease fluid and Na load with zosyn. I have personally reviewed the past medical history, past surgical history, medications, social history, and family history, and I have updated the database accordingly.   Past Medical History:     Past Medical History:   Diagnosis Date    Arthritis     CAD (coronary artery disease)     CHF (congestive heart failure) (HCC)     Dementia     Dyslipidemia     GERD (gastroesophageal reflux disease)     Hypertension     Osteoporosis     Renal insufficiency     Urinary incontinence      Past Surgical  History:     Past Surgical History:   Procedure Laterality Date    APPENDECTOMY      CARDIAC CATHETERIZATION  4-    CORONARY ARTERY BYPASS GRAFT N/A 2003    x2 with mitro valve repair    DILATION AND CURETTAGE OF UTERUS      EYE SURGERY Right     tear duct    HERNIA REPAIR      HYSTERECTOMY       Medications:      pantoprazole  40 mg Oral QAM AC    piperacillin-tazobactam  3.375 g Intravenous Q8H    aspirin  81 mg Oral Daily    chlorthalidone  12.5 mg Oral Daily    donepezil  10 mg Oral Nightly    lactobacillus  2 tablet Oral Daily    latanoprost  1 drop Both Eyes Nightly    memantine  10 mg Oral BID    ranolazine  500 mg Oral Daily    trospium  20 mg Oral BID AC     Social History:     Social History     Socioeconomic History    Marital status:      Spouse name: Doe Mccloud    Number of children: 2    Years of education: 12    Highest education level: Not on file   Occupational History    Occupation: [employer] Financial resource strain: Not on file    Food insecurity:     Worry: Not on file     Inability: Not on file    Transportation needs:     Medical: Not on file     Non-medical: Not on file   Tobacco Use    Smoking status: Never Smoker    Smokeless tobacco: Never Used   Substance and Sexual Activity    Alcohol use: No     Comment: very little    Drug use: No    Sexual activity: Not Currently   Lifestyle    Physical activity:     Days per week: Not on file     Minutes per session: Not on file    Stress: Not on file   Relationships    Social connections:     Talks on phone: Not on file     Gets together: Not on file     Attends Anabaptist service: Not on file     Active member of club or organization: Not on file     Attends meetings of clubs or organizations: Not on file     Relationship status: Not on file    Intimate partner violence:     Fear of current or ex partner: Not on file     Emotionally abused: Not on file     Physically abused: Not on file     Forced sexual activity: Not on file   Other Topics Concern    Not on file   Social History Narrative    Not on file     Family History:     Family History   Problem Relation Age of Onset    Diabetes Mother     Cancer Father     Anemia Father       Allergies:   Latex; Actonel [risedronate sodium]; Adhesive tape; Aminophylline; Amlodipine; Celebrex [celecoxib]; Chlorthalidone; Ciprofloxacin; Codeine; Colesevelam hcl; Darvocet a500 [propoxyphene n-acetaminophen]; Demerol hcl [meperidine]; Lisinopril; Lovastatin; Macrobid [nitrofurantoin monohyd macro]; Metoprolol; Nitrofuran derivatives; Nitrofurantoin; Propoxyphene; Ranitidine; Ranitidine hcl; Simvastatin; Tramadol hcl; Tramadol hcl; Trimethadione; Trimethoprim; Ultram [tramadol]; Vioxx [rofecoxib]; Naproxen; and Sulfa antibiotics     Review of Systems:   General: The patient has had some fatigue/weakness. No subjective fevers  Eyes: No double vision or blurry vision.   ENT: No sore throat or runny nose.  Cardiovascular: No chest pain or palpitations. Lung: No shortness of breath or cough. Abdomen: She's had some abdominal pain but no nausea vomiting or diarrhea  Genitourinary: She's had dysuria and frequency as well as some bladder pain  Musculoskeletal: No muscle aches or pains. Hematologic: No bleeding or bruising. Neurologic: No headache, weakness, numbness, or tingling. Physical Examination :   BP (!) 145/56   Pulse 83   Temp 97.9 °F (36.6 °C) (Oral)   Resp 16   Ht 5' 4\" (1.626 m)   Wt 141 lb 12.8 oz (64.3 kg)   SpO2 97%   BMI 24.34 kg/m²     Temperature Range: Temp: 97.9 °F (36.6 °C) Temp  Av.7 °F (36.5 °C)  Min: 97.2 °F (36.2 °C)  Max: 98.1 °F (36.7 °C)  General Appearance: Awake, alert, and in no apparent distress  Head: Normocephalic, without obvious abnormality, atraumatic  Eyes: Pupils equal, round, reactive, to light and accommodation; extraocular movements intact; sclera anicteric; conjunctivae pink  ENT: Oropharynx clear, without erythema, exudate, or thrush. Neck: Supple, without lymphadenopathy. Pulmonary/Chest: Clear to auscultation, without wheezes, rales, or rhonchi  Cardiovascular: Regular rate and rhythm without murmurs, rubs, or gallops. Abdomen: Positive bowel sounds in all 4 quadrants mild tenderness to palpation in the lower abdomen, no organomegaly or rebound tenderness appreciated  Extremities: No cyanosis, clubbing, edema, or effusions. Neurologic: No gross sensory or motor deficits. Skin: Warm and dry with no rash.     Medical Decision Making:   I have independently reviewed/ordered the following labs:  CBC with Differential:   Recent Labs     19  1234   WBC 6.4   HGB 14.5   HCT 43.0      LYMPHOPCT 23*   MONOPCT 9*     BMP:   Recent Labs     19  1234      K 3.6*      CO2 27   BUN 15   CREATININE 0.95*     Hepatic Function Panel:   Recent Labs     19  1234   PROT 7.3   LABALBU 4.1   BILIDIR 0.11   IBILI 0.30   BILITOT 0.41   ALKPHOS 66   ALT 13   AST 17     No results found for: CRP  No results found for: SEDRATE    No results for input(s): PROCAL in the last 72 hours. Color, UA YELLOW  YELLOW Final 04/16/2019 10:30 AM [facility]   Turbidity UA TURBIDAbnormal   CLEAR Final 04/16/2019 10:30 AM [facility]   Glucose, Ur NEGATIVE  NEGATIVE Final 04/16/2019 10:30 AM [facility]   Bilirubin Urine NEGATIVE  NEGATIVE Final 04/16/2019 10:30 AM [facility]   Ketones, Urine NEGATIVE  NEGATIVE Final 04/16/2019 10:30 AM [facility]   Specific [place], UA 1.020  1.005 - 1.030 Final 04/16/2019 10:30 AM [facility]   Urine Hgb 1+Abnormal   NEGATIVE Final 04/16/2019 10:30 AM [facility]   pH, UA 6.5  5.0 - 8.0 Final 04/16/2019 10:30 AM [facility]   Protein, UA 2+Abnormal   NEGATIVE Final 04/16/2019 10:30 AM [facility]   Urobilinogen, Urine Normal  Normal Final 04/16/2019 10:30 AM [facility]   Nitrite, Urine NEGATIVE  NEGATIVE Final 04/16/2019 10:30 AM [facility]   Leukocyte Esterase, Urine LARGEAbnormal   NEGATIVE Final 04/16/2019 10:30 AM [facility]     WBC, UA  0 - 5 /HPF Final 04/16/2019 10:30 AM [facility]   TOO NUMEROUS TO COUNT    RBC, UA 5 TO 10  0 - 2 /HPF Final 04/16/2019 10:30 AM [facility]       Imaging Studies:   RETROPERITONEAL ULTRASOUND OF THE KIDNEYS AND URINARY BLADDER 4/19/2019   FINDINGS:   1. Collapsed urinary bladder with catheter in place. 2. No hydronephrosis. 3. Simple midpole left renal hilum or parapelvic cyst measuring up to 1.5 cm.        Cultures:     URINE,STRAIGHT CATHETER    Special Requests 04/16/2019 10:30  [facility]   NOT REPORTED    Culture Abnormal  04/16/2019 10:30 AM [address]   50 to 100,000 CFU/ML        Urine culture clean catch [[id number]] Collected: 04/19/19 1400   Order Status: No result Specimen: Urine, clean catch Updated: 04/19/19 1440   CULTURE BLOOD #1 [[id number]] Collected: 04/19/19 1234   Order Status: Sent Specimen: Blood Updated: 04/19/19 1429       Thank you for allowing us to participate in the care of this patient. Please call with questions. Electronically signed by Betzy Lozano MD on 4/20/2019 at 9:39 AM      Infectious Disease [address]rence Mejias MD    [employer] [facility]  OFFICE: [phone]  CELL:     [phone]    This note is created with the assistance of a speech recognition program.  While intending to generate a document that actually reflects the content of the visit, the document can still have some errors including those of syntax and sound a like substitutions which may escape proof reading. It such instances, actual meaning can be extrapolated by contextual diversion.

## 2019-04-20 NOTE — PROGRESS NOTES
Physical Therapy    Facility/Department: [facility]  Initial Assessment    NAME: Parish Gilbert  : 1929  MRN: [id number]    Date of Service: 2019    Discharge Recommendations:  Home with assist PRN, Home with Home health PT        Assessment   Body structures, Functions, Activity limitations: Decreased strength;Decreased functional mobility ; Decreased endurance;Decreased balance  Prognosis: Good  Decision Making: High Complexity  REQUIRES PT FOLLOW UP: Yes  Activity Tolerance  Activity Tolerance: Patient limited by endurance       Patient Diagnosis(es): There were no encounter diagnoses. has a past medical history of Arthritis, CAD (coronary artery disease), CHF (congestive heart failure) ([facility] [address] Dementia, Dyslipidemia, GERD (gastroesophageal reflux disease), Hypertension, Osteoporosis, Renal insufficiency, and Urinary incontinence. has a past surgical history that includes Hysterectomy; Dilation and curettage of uterus; Appendectomy; hernia repair; Eye surgery (Right); Coronary artery bypass graft (N/A, ); and Cardiac catheterization (2016).     Restrictions  Restrictions/Precautions  Restrictions/Precautions: General Precautions, Fall Risk  Vision/Hearing  Vision: Impaired  Vision Exceptions: Wears glasses at all times  Hearing: Exceptions to [facility]  Hearing Exceptions: Bilateral hearing aid     Subjective  General  Chart Reviewed: Yes  Patient assessed for rehabilitation services?: Yes  Response To Previous Treatment: Not applicable  Family / Caregiver Present: Yes( and grandson)  Follows Commands: Within Functional Limits  General Comment  Comments: OK for PT per Garett Solis RN  Pain Screening  Patient Currently in Pain: Denies  Vital Signs  Patient Currently in Pain: Denies       Orientation  Orientation  Overall Orientation Status: Within Normal Limits  Social/Functional History  Social/Functional History  Lives With: Spouse  Type of Home: House  Home Layout: One level  Home Access: Stairs to enter with rails  Entrance Stairs - Number of Steps: 4  Entrance Stairs - Rails: Left  Bathroom Shower/Tub: Tub/Shower unit  Bathroom Toilet: Handicap height  Bathroom Equipment: Grab bars in shower, Shower chair  Home Equipment: Rolling walker, Cane  Receives Help From: Family  ADL Assistance: Needs assistance  Homemaking Assistance: Needs assistance  Meal Prep: Other (comment)(Meals on wheels)  Cognition        Objective     Observation/Palpation  Posture: Fair(Slightly khyphotic)    AROM RLE (degrees)  RLE AROM: WNL  AROM LLE (degrees)  LLE AROM : WNL  AROM RUE (degrees)  RUE AROM : WNL  AROM LUE (degrees)  LUE AROM : WNL  Strength RLE  Strength RLE: WFL  Comment: B ankles 5/5, B LE's 4/5 otherwise  Strength LLE  Strength LLE: WFL  Strength RUE  Strength RUE: WFL  Comment: B UE's 4/5  Strength LUE  Strength LUE: WFL  Tone RLE  RLE Tone: Normotonic  Tone LLE  LLE Tone: Normotonic  Motor Control  Gross Motor?: WNL  Sensation  Overall Sensation Status: WNL(Pt. states randomly she will experienceL LE paresthesia)  Bed mobility  Rolling to Left: Supervision  Rolling to Right: Supervision  Supine to Sit: Moderate assistance  Sit to Supine: Moderate assistance  Scooting:  Moderate assistance;2 Person assistance  Transfers  Sit to Stand: Minimal Assistance  Stand to sit: Minimal Assistance  Stand Pivot Transfers: Minimal Assistance  Ambulation  Ambulation?: Yes  Ambulation 1  Surface: level tile  Device: Rolling Walker  Assistance: Minimal assistance  Quality of Gait: Good pattern  Distance: 25' x 1  Comments: BTB     Balance  Posture: Fair  Sitting - Static: Good  Sitting - Dynamic: Good  Standing - Static: (w/ RW)  Standing - Dynamic: (w/ RW)        Plan   Plan  Times per week: 1-2x/day,6-7 days/week  Current Treatment Recommendations: Strengthening, Transfer Training, Balance Training, Endurance Training, Stair training, Gait Training, Functional Mobility Training, Home Exercise Program  Safety Devices  Type of devices: Left in bed, Gait belt, Bed alarm in place, Call light within reach    G-Code       OutComes Score                                                  AM-PAC Score  AM-PAC Inpatient Mobility Raw Score : 17  AM-PAC Inpatient T-Scale Score : 42.13  Mobility Inpatient CMS 0-100% Score: 50.57  Mobility Inpatient CMS G-Code Modifier : CK          Goals  Short term goals  Time Frame for Short term goals: 12 treatments  Short term goal 1: Independent bed mobility/transfers  Short term goal 2:  Independent ambulation w/ ' x 1  Short term goal 3: 1/2 grade strength increase  Short term goal 4: Tolerate 30 min ther act/Good standing balance  Short term goal 5: CGA ascending/descending 4 steps w/ 1 HR  Patient Goals   Patient goals : Go home       Therapy Time   Individual Concurrent Group Co-treatment   Time In [address]         Time Out 1451         Minutes [address] Trevin Chau

## 2019-04-20 NOTE — PROGRESS NOTES
Pt has been awake all night and confused. Tele-sitter placed in patients room at about 0330 do to patient trying to get up constantly with the trujillo.

## 2019-04-21 VITALS
TEMPERATURE: 97.5 F | HEART RATE: 77 BPM | RESPIRATION RATE: 14 BRPM | OXYGEN SATURATION: 99 % | WEIGHT: 141.8 LBS | SYSTOLIC BLOOD PRESSURE: 131 MMHG | BODY MASS INDEX: 24.21 KG/M2 | DIASTOLIC BLOOD PRESSURE: 58 MMHG | HEIGHT: 64 IN

## 2019-04-21 LAB
CULTURE: ABNORMAL
Lab: ABNORMAL
SPECIMEN DESCRIPTION: ABNORMAL

## 2019-04-21 PROCEDURE — 2580000003 HC RX 258: Performed by: INTERNAL MEDICINE

## 2019-04-21 PROCEDURE — 99232 SBSQ HOSP IP/OBS MODERATE 35: CPT | Performed by: INTERNAL MEDICINE

## 2019-04-21 PROCEDURE — 6360000002 HC RX W HCPCS: Performed by: INTERNAL MEDICINE

## 2019-04-21 PROCEDURE — 6370000000 HC RX 637 (ALT 250 FOR IP): Performed by: FAMILY MEDICINE

## 2019-04-21 PROCEDURE — 2580000003 HC RX 258: Performed by: FAMILY MEDICINE

## 2019-04-21 PROCEDURE — 99239 HOSP IP/OBS DSCHRG MGMT >30: CPT | Performed by: FAMILY MEDICINE

## 2019-04-21 RX ORDER — TROSPIUM CHLORIDE 20 MG/1
20 TABLET, FILM COATED ORAL
Qty: 60 TABLET | Refills: 3 | Status: SHIPPED | OUTPATIENT
Start: 2019-04-21 | End: 2019-09-18 | Stop reason: ALTCHOICE

## 2019-04-21 RX ORDER — PANTOPRAZOLE SODIUM 40 MG/1
40 TABLET, DELAYED RELEASE ORAL
Qty: 30 TABLET | Refills: 3 | Status: SHIPPED | OUTPATIENT
Start: 2019-04-22 | End: 2019-09-18 | Stop reason: ALTCHOICE

## 2019-04-21 RX ORDER — MEMANTINE HYDROCHLORIDE 10 MG/1
10 TABLET ORAL 2 TIMES DAILY
Qty: 60 TABLET | Refills: 3 | Status: SHIPPED | OUTPATIENT
Start: 2019-04-21 | End: 2019-09-12 | Stop reason: SDUPTHER

## 2019-04-21 RX ADMIN — MEMANTINE HYDROCHLORIDE 10 MG: 10 TABLET, FILM COATED ORAL at 08:37

## 2019-04-21 RX ADMIN — POTASSIUM CHLORIDE 20 MEQ: 20 TABLET, EXTENDED RELEASE ORAL at 08:36

## 2019-04-21 RX ADMIN — CEFEPIME HYDROCHLORIDE 2 G: 2 INJECTION, POWDER, FOR SOLUTION INTRAVENOUS at 11:30

## 2019-04-21 RX ADMIN — LORAZEPAM 0.25 MG: 0.5 TABLET ORAL at 08:37

## 2019-04-21 RX ADMIN — SODIUM CHLORIDE: 9 INJECTION, SOLUTION INTRAVENOUS at 06:26

## 2019-04-21 RX ADMIN — PROBIOTIC PRODUCT - TAB 2 TABLET: TAB at 08:36

## 2019-04-21 RX ADMIN — CHLORTHALIDONE 12.5 MG: 25 TABLET ORAL at 08:37

## 2019-04-21 RX ADMIN — TROSPIUM CHLORIDE 20 MG: 20 TABLET, FILM COATED ORAL at 06:27

## 2019-04-21 RX ADMIN — PANTOPRAZOLE SODIUM 40 MG: 40 TABLET, DELAYED RELEASE ORAL at 06:27

## 2019-04-21 RX ADMIN — ASPIRIN 81 MG: 81 TABLET, COATED ORAL at 08:37

## 2019-04-21 ASSESSMENT — PAIN SCALES - GENERAL: PAINLEVEL_OUTOF10: 0

## 2019-04-21 NOTE — FLOWSHEET NOTE
04/21/19 1133   Provider Notification   Reason for Communication Review case   Provider Name Dr. Elvira Clark   Provider Notification Physician   Method of Communication Page   Response Waiting for response   Notification Time [id number]     Dr. Alycia Mcdaniel believes patient will not need antibiotics at discharge. Saying she may have received enough zosyn and cefepime if she is feeling better and that the omnicef will not help her for her UTI. Dr. Elvira Clark paged to review discharge medication based on Dr. Adriana Gentile recommendation. Electronically signed by Annabelle Paget, RN on 4/21/2019 at 11:36 AM    Dr. Elvira Clark returned call at this time. Dr. Elvira Clark wanting Dr. Alycia Mcdaniel to determine discharge medication as his prior note said cefepime until 4/25/2019. Dr. Alycia Mcdaniel indicated he will not be in until later as he is seeing patients at [address] currently. Will wait for Dr. Alycia Mcdaniel to see patient and complete discharge medications.     Electronically signed by Annabelle Paget, RN on 4/21/2019 at 12:27 PM

## 2019-04-21 NOTE — PLAN OF CARE
Problem: Nutrition  Goal: Optimal nutrition therapy  Description  Nutrition Problem: Unintended weight loss  Intervention: Food and/or Nutrient Delivery: Continue current diet, Start ONS  Nutritional Goals: Patient able to tolerate PO intake >75% of estimated kcal/protein needs    4/20/2019 2253 by Cipriano Negrete RN  Outcome: Ongoing     Problem: Falls - Risk of:  Goal: Will remain free from falls  Description  Will remain free from falls  Outcome: Ongoing  Note:   Pain level assessment complete.    Patient educated on pain scale and control interventions  PRN pain medication given per patient request  Patient instructed to call out with new onset of pain or unrelieved pain

## 2019-04-21 NOTE — DISCHARGE SUMMARY
Physician Discharge Summary     Patient ID:  Franklin Ngo  [id number]  81 y.o.  1/12/1929    Admit date: 4/19/2019    Discharge date and time: 4-21-19     Admitting Physician: Chica Stewart MD     Discharge Physician: Dr Steph Hurst    Admission Diagnoses: Pseudomonas urinary tract infection [N39.0, B96.5]    Discharge Diagnoses:Pseudomonas  UTI / Weakness    Admission Condition: poor    Discharged Condition: stable    Indication for Admission: Weakness, Failed OP treatment for recurrent UTI    Hospital Course: Stable    Consults: ID/ Urology    Significant Diagnostic Studies: labs/Xrays    Treatments: IV hydration, antibiotics: Ancef     Discharge Exam:  BP (!) 131/58   Pulse 77   Temp 97.5 °F (36.4 °C)   Resp 14   Ht 5' 4\" (1.626 m)   Wt 141 lb 12.8 oz (64.3 kg)   SpO2 99%   BMI 24.34 kg/m²     General Appearance:    Alert, cooperative, no distress, appears stated age   Head:    Normocephalic, without obvious abnormality, atraumatic   Eyes:    PERRL, conjunctiva/corneas clear, EOM's intact, fundi     benign, both eyes   Ears:    Normal TM's and external ear canals, both ears   Nose:   Nares normal, septum midline, mucosa normal, no drainage    or sinus tenderness   Throat:   Lips, mucosa, and tongue normal; teeth and gums normal   Neck:   Supple, symmetrical, trachea midline, no adenopathy;     thyroid:  no enlargement/tenderness/nodules; no carotid    bruit or JVD   Back:     Symmetric, no curvature, ROM normal, no CVA tenderness   Lungs:     Clear to auscultation bilaterally, respirations unlabored   Chest Wall:    No tenderness or deformity    Heart:    Regular rate and rhythm, S1 and S2 normal, no murmur, rub   or gallop       Abdomen:     Soft, non-tender, bowel sounds active all four quadrants,     no masses, no organomegaly           Extremities:   Extremities normal, atraumatic, no cyanosis or edema   Pulses:   2+ and symmetric all extremities   Skin:   Skin color, texture, turgor normal, no rashes or lesions   Lymph nodes:   Cervical, supraclavicular, and axillary nodes normal   Neurologic:   CNII-XII intact, normal strength, sensation and reflexes     throughout       Disposition: home    In process/preliminary results:  Outstanding Order Results     Date and Time Order Name Status Description    4/19/2019 1400 Urine culture clean catch Preliminary     4/19/2019 1234 CULTURE BLOOD #1 Preliminary           Patient Instructions:   Current Discharge Medication List      START taking these medications    Details   trospium (SANCTURA) 20 MG tablet Take 1 tablet by mouth 2 times daily (before meals)  Qty: 60 tablet, Refills: 3      pantoprazole (PROTONIX) 40 MG tablet Take 1 tablet by mouth every morning (before breakfast)  Qty: 30 tablet, Refills: 3         CONTINUE these medications which have CHANGED    Details   !! memantine (NAMENDA) 10 MG tablet Take 1 tablet by mouth 2 times daily  Qty: 60 tablet, Refills: 3       !! - Potential duplicate medications found. Please discuss with provider. CONTINUE these medications which have NOT CHANGED    Details   chlorthalidone (HYGROTON) 25 MG tablet Take 12.5 mg by mouth Twice a Week Indications: on thursday and sunday mornings      darifenacin (ENABLEX) 7.5 MG extended release tablet Take 7.5 mg by mouth daily      hydrALAZINE (APRESOLINE) 25 MG tablet Take 25 mg by mouth 3 times daily as needed (SBP > 140)      ! ! memantine (NAMENDA) 10 MG tablet Take 10 mg by mouth 2 times daily      cefdinir (OMNICEF) 300 MG capsule Take 1 capsule by mouth 2 times daily for 7 days  Qty: 14 capsule, Refills: 0      donepezil (ARICEPT) 10 MG tablet Take 1 tablet by mouth nightly  Qty: 90 tablet, Refills: 3    Comments: Generic For:ARICEPT 10MG  12/31/2018 9:04:43 AM  Associated Diagnoses: Vascular dementia without behavioral disturbance      Lactobacillus (ACIDOPHILUS) CAPS capsule Take 2 capsules by mouth daily  Qty: 60 capsule, Refills: 2    Comments: 01/23/2017 9:32:32 AM  Associated Diagnoses: Diarrhea, unspecified type      nitroGLYCERIN (NITROSTAT) 0.4 MG SL tablet Place 1 tablet under the tongue as needed for Chest pain  Qty: 25 tablet, Refills: 3      ezetimibe (ZETIA) 10 MG tablet Take 1 tablet by mouth daily  Qty: 30 tablet, Refills: 3      Multiple Vitamins-Minerals (CENTRUM SILVER) TABS Take 1 tablet by mouth daily  Qty: 30 tablet, Refills: 3      omeprazole (PRILOSEC) 20 MG capsule Take 1 capsule by mouth 2 times daily  Qty: 180 capsule, Refills: 1    Associated Diagnoses: GERD (gastroesophageal reflux disease)      aspirin 81 MG tablet Take 1 tablet by mouth daily  Qty: 30 tablet, Refills: 3      latanoprost (XALATAN) 0.005 % ophthalmic solution Place 1 drop into both eyes nightly        !! - Potential duplicate medications found. Please discuss with provider. Activity: activity as tolerated  Diet: cardiac diet  Wound Care: NA    Follow-up with PCP in 1 week.     Signed:  Justino Pickens MD  4/21/2019  10:50 AM

## 2019-04-21 NOTE — PROGRESS NOTES
Pt discharged to home per wheelchair in stable condition with belongings  Discharge instructions given & signed  Discharge checklist reviewed and completed with pt and family. Pt denies having any further questions at this time  Locked up home medication(s)/personal items given to patient at discharge  Patient/family state they have everything they were admitted with.

## 2019-04-21 NOTE — PROGRESS NOTES
Progress Note      [facility]  Progress Note    Date: 4/21/2019    Impression:   1. Pseudomonas aeruginosa urinary tract infection  2. Bladder Outlet obstruction and self catheterizes twice a day at home  3. CAD status post coronary artery bypass grafting  4. Essential hypertension  5. Enterobacter bacteriuria    Recommendations   · Discontinue intravenous cefepime  · While the patient previously had E. coli and Proteus isolated the patient currently has pseudomonas aeruginosa which would likely explain why the symptoms were not improving until she received IV antibiotics  · No oral antibiotic available for Pseudomonas. · Had originally scheduled Cefepime 2 gm IV q 24 hr through 4-25 but patient has responded very well and is adamant about going home. Stop date changed to 4-21-17. · Discussed with . I gave him my card to contact us if additional symptoms would develop at home. · Office f/up in 4 weeks with Dr Chloe Roland for infection. Please call [phone] for appointment    Chief complaint/reason for consultation:   Pseudomonas urinary tract infection    History of Present Illness:   Tracy Hess is a 80y.o.-year-old female who was initially admitted on 4/19/2019. INITIAL HISTORY:     Barb Patel is seen and evaluated at bedside and the history is obtained mostly from her . The  reports that she does have some underlying dementia, pulmonary hypertension, chronic kidney disease, urinary incontinence and self catheterizes at home twice a day, coronary artery disease status post coronary artery bypass grafting ×2, history of mitral valve repair, essential hypertension. The  reports that the patient has had recurrent urinary tract infections and for at least the last month he reports that she's been dealing a urinary tract infections.  She was seen at [facility] emergency department and diagnosed with a urinary tract infection for which she was started on cephalexin. She is taking this 4 times a day for 5 days and then take it 3 times a day for 10 days for a total of 15 days. The patient was then seen by the emergency department here on 4/16/19 who prescribed a different antibiotic Ceftdinir with no improvement. The patient was seen by Dr. Blaise Wakefield in the office today and again continued dysuria, frequency and pelvic pain. Has had some generalized weakness as well has loss of appetite. Cultures from 4/16/19  Growing pseudomonas aeruginosa and patient has been admitted started on Zosyn and I was asked to evaluate and help with antibiotic choice. CURRENT EVALUATION :4/21/2019    Afebrile  VS stable   feeling much better  Cultures with Pseudomonas sensitive to cefepime, resistant to cipro    Switched to cefepime to decrease fluid and Na load with zosyn. Patient has responded well to treatment. Urine is currently clear and she is asymptomatic. Last clean catch urine culture with Enterobacter 4-19-19. This is likely contamination of the sample. Discussed with patient, RN, family at length. I have personally reviewed the past medical history, past surgical history, medications, social history, and family history, and I have updated the database accordingly.   Past Medical History:     Past Medical History:   Diagnosis Date    Arthritis     CAD (coronary artery disease)     CHF (congestive heart failure) (HCC)     Dementia     Dyslipidemia     GERD (gastroesophageal reflux disease)     Hypertension     Osteoporosis     Renal insufficiency     Urinary incontinence      Past Surgical  History:     Past Surgical History:   Procedure Laterality Date    APPENDECTOMY      CARDIAC CATHETERIZATION  4-    CORONARY ARTERY BYPASS GRAFT N/A 2003    x2 with mitro valve repair    DILATION AND CURETTAGE OF UTERUS      EYE SURGERY Right     tear duct    HERNIA REPAIR      HYSTERECTOMY       Medications:      cefepime  2 g Intravenous Q24H    [celecoxib]; Chlorthalidone; Ciprofloxacin; Codeine; Colesevelam hcl; Darvocet a500 [propoxyphene n-acetaminophen]; Demerol hcl [meperidine]; Lisinopril; Lovastatin; Macrobid [nitrofurantoin monohyd macro]; Metoprolol; Nitrofuran derivatives; Nitrofurantoin; Propoxyphene; Ranitidine; Ranitidine hcl; Simvastatin; Tramadol hcl; Tramadol hcl; Trimethadione; Trimethoprim; Ultram [tramadol]; Vioxx [rofecoxib]; Naproxen; and Sulfa antibiotics     Review of Systems:   General: The patient has had some fatigue/weakness. No subjective fevers  Eyes: No double vision or blurry vision. ENT: No sore throat or runny nose. Cardiovascular: No chest pain or palpitations. Lung: No shortness of breath or cough. Abdomen: She's had some abdominal pain but no nausea vomiting or diarrhea  Genitourinary: She's had dysuria and frequency as well as some bladder pain  Musculoskeletal: No muscle aches or pains. Hematologic: No bleeding or bruising. Neurologic: No headache, weakness, numbness, or tingling. Physical Examination :   BP (!) 131/58   Pulse 77   Temp 97.5 °F (36.4 °C)   Resp 14   Ht 5' 4\" (1.626 m)   Wt 141 lb 12.8 oz (64.3 kg)   SpO2 99%   BMI 24.34 kg/m²     Temperature Range: Temp: 97.5 °F (36.4 °C) Temp  Av.9 °F (36.6 °C)  Min: 97.5 °F (36.4 °C)  Max: 98.1 °F (36.7 °C)  General Appearance: Awake, alert, and in no apparent distress  Head: Normocephalic, without obvious abnormality, atraumatic  Eyes: Pupils equal, round, reactive, to light and accommodation; extraocular movements intact; sclera anicteric; conjunctivae pink  ENT: Oropharynx clear, without erythema, exudate, or thrush. Neck: Supple, without lymphadenopathy. Pulmonary/Chest: Clear to auscultation, without wheezes, rales, or rhonchi  Cardiovascular: Regular rate and rhythm without murmurs, rubs, or gallops.    Abdomen: Positive bowel sounds in all 4 quadrants mild tenderness to palpation in the lower abdomen, no organomegaly or rebound tenderness appreciated  Extremities: No cyanosis, clubbing, edema, or effusions. Neurologic: No gross sensory or motor deficits. Skin: Warm and dry with no rash. Medical Decision Making:   I have independently reviewed/ordered the following labs:  CBC with Differential:   Recent Labs     04/19/19  1234   WBC 6.4   HGB 14.5   HCT 43.0      LYMPHOPCT 23*   MONOPCT 9*     BMP:   Recent Labs     04/19/19  1234      K 3.6*      CO2 27   BUN 15   CREATININE 0.95*     Hepatic Function Panel:   Recent Labs     04/19/19  1234   PROT 7.3   LABALBU 4.1   BILIDIR 0.11   IBILI 0.30   BILITOT 0.41   ALKPHOS 66   ALT 13   AST 17     No results found for: CRP  No results found for: SEDRATE    No results for input(s): PROCAL in the last 72 hours.      Color, UA YELLOW  YELLOW Final 04/16/2019 10:30 AM [facility]   Turbidity UA TURBIDAbnormal   CLEAR Final 04/16/2019 10:30 AM [facility]   Glucose, Ur NEGATIVE  NEGATIVE Final 04/16/2019 10:30 AM [facility]   Bilirubin Urine NEGATIVE  NEGATIVE Final 04/16/2019 10:30 AM [facility]   Ketones, Urine NEGATIVE  NEGATIVE Final 04/16/2019 10:30 AM [facility]   Specific [place], UA 1.020  1.005 - 1.030 Final 04/16/2019 10:30 AM [facility]   Urine Hgb 1+Abnormal   NEGATIVE Final 04/16/2019 10:30 AM [facility]   pH, UA 6.5  5.0 - 8.0 Final 04/16/2019 10:30 AM [facility]   Protein, UA 2+Abnormal   NEGATIVE Final 04/16/2019 10:30 AM [facility]   Urobilinogen, Urine Normal  Normal Final 04/16/2019 10:30 AM [facility]   Nitrite, Urine NEGATIVE  NEGATIVE Final 04/16/2019 10:30 AM [facility]   Leukocyte Esterase, Urine LARGEAbnormal   NEGATIVE Final 04/16/2019 10:30 AM [facility]     WBC, UA  0 - 5 /HPF Final 04/16/2019 10:30 AM [facility]   TOO NUMEROUS TO COUNT    RBC, UA 5 TO 10  0 -

## 2019-04-22 ENCOUNTER — TELEPHONE (OUTPATIENT)
Dept: FAMILY MEDICINE CLINIC | Age: 84
End: 2019-04-22

## 2019-04-25 LAB
CULTURE: NORMAL
Lab: NORMAL
SPECIMEN DESCRIPTION: NORMAL

## 2019-04-29 ENCOUNTER — OFFICE VISIT (OUTPATIENT)
Dept: FAMILY MEDICINE CLINIC | Age: 84
End: 2019-04-29
Payer: MEDICARE

## 2019-04-29 VITALS
SYSTOLIC BLOOD PRESSURE: 139 MMHG | HEIGHT: 64 IN | HEART RATE: 96 BPM | BODY MASS INDEX: 24.52 KG/M2 | OXYGEN SATURATION: 97 % | DIASTOLIC BLOOD PRESSURE: 73 MMHG | WEIGHT: 143.6 LBS

## 2019-04-29 DIAGNOSIS — F03.90 DEMENTIA WITHOUT BEHAVIORAL DISTURBANCE, UNSPECIFIED DEMENTIA TYPE: ICD-10-CM

## 2019-04-29 DIAGNOSIS — N39.0 RECURRENT UTI (URINARY TRACT INFECTION): ICD-10-CM

## 2019-04-29 DIAGNOSIS — N39.0 URINARY TRACT INFECTION WITHOUT HEMATURIA, SITE UNSPECIFIED: Primary | ICD-10-CM

## 2019-04-29 DIAGNOSIS — R53.1 WEAKNESS: ICD-10-CM

## 2019-04-29 PROCEDURE — 1111F DSCHRG MED/CURRENT MED MERGE: CPT | Performed by: FAMILY MEDICINE

## 2019-04-29 PROCEDURE — 99495 TRANSJ CARE MGMT MOD F2F 14D: CPT | Performed by: FAMILY MEDICINE

## 2019-04-29 RX ORDER — DIPHENHYDRAMINE HYDROCHLORIDE 50 MG/ML
INJECTION INTRAMUSCULAR; INTRAVENOUS
COMMUNITY
Start: 2019-03-15 | End: 2019-09-18 | Stop reason: ALTCHOICE

## 2019-04-29 RX ORDER — CEFEPIME HYDROCHLORIDE 2 G/1
INJECTION, POWDER, FOR SOLUTION INTRAVENOUS
COMMUNITY
Start: 2019-03-15 | End: 2019-09-18 | Stop reason: ALTCHOICE

## 2019-04-29 ASSESSMENT — PATIENT HEALTH QUESTIONNAIRE - PHQ9
SUM OF ALL RESPONSES TO PHQ QUESTIONS 1-9: 0
SUM OF ALL RESPONSES TO PHQ QUESTIONS 1-9: 0
1. LITTLE INTEREST OR PLEASURE IN DOING THINGS: 0
SUM OF ALL RESPONSES TO PHQ9 QUESTIONS 1 & 2: 0
2. FEELING DOWN, DEPRESSED OR HOPELESS: 0

## 2019-04-29 ASSESSMENT — ENCOUNTER SYMPTOMS
EYE PAIN: 0
RECTAL PAIN: 0
SINUS PRESSURE: 0
COLOR CHANGE: 0
ABDOMINAL PAIN: 0
SHORTNESS OF BREATH: 0
NAUSEA: 0
VOMITING: 0
CHEST TIGHTNESS: 0
ANAL BLEEDING: 0
VOICE CHANGE: 0
TROUBLE SWALLOWING: 0
EYE DISCHARGE: 0
BACK PAIN: 0
BLOOD IN STOOL: 0
CONSTIPATION: 0
COUGH: 0
ABDOMINAL DISTENTION: 0
DIARRHEA: 0
EYE REDNESS: 0

## 2019-04-29 NOTE — PROGRESS NOTES
[Celecoxib] Itching     Other reaction(s): Unknown  Other reaction(s): Unknown    Chlorthalidone     Ciprofloxacin      Pain muscles      Codeine Nausea Only     Other reaction(s): Unknown  headache    Colesevelam Hcl     Darvocet A500 [Propoxyphene N-Acetaminophen] Nausea Only     headache    Demerol Hcl [Meperidine] Itching     Other reaction(s): Unknown  Flash backs- and did not help pain  Other reaction(s): Unknown  Flash backs- and did not help pain    Lisinopril     Lovastatin     Macrobid [Nitrofurantoin Monohyd Macro] Hives    Metoprolol     Nitrofuran Derivatives     Nitrofurantoin Hives     Other reaction(s): Unknown    Propoxyphene Nausea Only     headache    Ranitidine     Ranitidine Hcl      pain    Simvastatin     Tramadol Hcl      Other reaction(s): Unknown    Tramadol Hcl      Other reaction(s): Unknown    Trimethadione     Trimethoprim     Ultram [Tramadol] Itching     Other reaction(s): Unknown    Vioxx [Rofecoxib]     Naproxen Rash and Itching       rash    Sulfa Antibiotics Rash and Itching     Other reaction(s): Unknown       Medications listed as ordered at the time of discharge from [facility] Medication Instructions ALFREDO:    Printed on:04/29/19 1312   Medication Information                      aspirin 81 MG tablet  Take 1 tablet by mouth daily             ceFEPIme (MAXIPIME) 2 g injection               chlorthalidone (HYGROTON) 25 MG tablet  Take 12.5 mg by mouth Twice a Week Indications: on thursday and sunday mornings             darifenacin (ENABLEX) 7.5 MG extended release tablet  Take 7.5 mg by mouth daily             diphenhydrAMINE (BENADRYL) 50 MG/ML injection               donepezil (ARICEPT) 10 MG tablet  Take 1 tablet by mouth nightly             ezetimibe (ZETIA) 10 MG tablet  Take 1 tablet by mouth daily             hydrALAZINE (APRESOLINE) 25 MG tablet  Take 25 mg by mouth 3 times daily as needed (SBP > 140) Lactobacillus (ACIDOPHILUS) CAPS capsule  Take 2 capsules by mouth daily             latanoprost (XALATAN) 0.005 % ophthalmic solution  Place 1 drop into both eyes nightly              memantine (NAMENDA) 10 MG tablet  Take 10 mg by mouth 2 times daily             memantine (NAMENDA) 10 MG tablet  Take 1 tablet by mouth 2 times daily             Multiple Vitamins-Minerals (CENTRUM SILVER) TABS  Take 1 tablet by mouth daily             nitroGLYCERIN (NITROSTAT) 0.4 MG SL tablet  Place 1 tablet under the tongue as needed for Chest pain             omeprazole (PRILOSEC) 20 MG capsule  Take 1 capsule by mouth 2 times daily             pantoprazole (PROTONIX) 40 MG tablet  Take 1 tablet by mouth every morning (before breakfast)             trospium (SANCTURA) 20 MG tablet  Take 1 tablet by mouth 2 times daily (before meals)                   Medications marked \"taking\" at this time  Outpatient Medications Marked as Taking for the 4/29/19 encounter (Office Visit) with Jose Harris MD   Medication Sig Dispense Refill    diphenhydrAMINE (BENADRYL) 50 MG/ML injection       ceFEPIme (MAXIPIME) 2 g injection       trospium (SANCTURA) 20 MG tablet Take 1 tablet by mouth 2 times daily (before meals) 60 tablet 3    pantoprazole (PROTONIX) 40 MG tablet Take 1 tablet by mouth every morning (before breakfast) 30 tablet 3    memantine (NAMENDA) 10 MG tablet Take 1 tablet by mouth 2 times daily 60 tablet 3    chlorthalidone (HYGROTON) 25 MG tablet Take 12.5 mg by mouth Twice a Week Indications: on thursday and sunday mornings      darifenacin (ENABLEX) 7.5 MG extended release tablet Take 7.5 mg by mouth daily      hydrALAZINE (APRESOLINE) 25 MG tablet Take 25 mg by mouth 3 times daily as needed (SBP > 140)      memantine (NAMENDA) 10 MG tablet Take 10 mg by mouth 2 times daily      donepezil (ARICEPT) 10 MG tablet Take 1 tablet by mouth nightly 90 tablet 3    Lactobacillus (ACIDOPHILUS) CAPS capsule Take 2 capsules by mouth daily 60 capsule 2    nitroGLYCERIN (NITROSTAT) 0.4 MG SL tablet Place 1 tablet under the tongue as needed for Chest pain 25 tablet 3    ezetimibe (ZETIA) 10 MG tablet Take 1 tablet by mouth daily 30 tablet 3    Multiple Vitamins-Minerals (CENTRUM SILVER) TABS Take 1 tablet by mouth daily 30 tablet 3    omeprazole (PRILOSEC) 20 MG capsule Take 1 capsule by mouth 2 times daily 180 capsule 1    aspirin 81 MG tablet Take 1 tablet by mouth daily 30 tablet 3    latanoprost (XALATAN) 0.005 % ophthalmic solution Place 1 drop into both eyes nightly           Medications patient taking as of now reconciled against medications ordered at time of hospital discharge: Yes    Chief Complaint   Patient presents with    Follow-Up from Hospital       HPI Here for recheck after Hospitalization for UTI with pseudomonas. After completing   The course of antibiotics doing a whole lot better, went to [facility], mood seems better and overall family has noted improvement. Inpatient course: Discharge summary reviewed- see chart. Interval history/Current status: Improved significantly , doing a lot better than she has been on months    Review of Systems   Constitutional: Negative for activity change, appetite change and fatigue. HENT: Negative for dental problem, ear pain, hearing loss, postnasal drip, sinus pressure, sneezing, tinnitus, trouble swallowing and voice change. Eyes: Negative for pain, discharge, redness and visual disturbance. Respiratory: Negative for cough, chest tightness and shortness of breath. Cardiovascular: Negative for chest pain, palpitations and leg swelling. Gastrointestinal: Negative for abdominal distention, abdominal pain, anal bleeding, blood in stool, constipation, diarrhea, nausea, rectal pain and vomiting. Endocrine: Negative for cold intolerance, heat intolerance, polydipsia, polyphagia and polyuria.    Genitourinary: Negative for decreased urine volume, difficulty urinating, dyspareunia, dysuria, enuresis, flank pain, frequency, genital sores, hematuria, menstrual problem, pelvic pain, urgency, vaginal bleeding and vaginal discharge. Musculoskeletal: Negative for arthralgias, back pain, gait problem, joint swelling, myalgias, neck pain and neck stiffness. Skin: Negative for color change, pallor and rash. Allergic/Immunologic: Negative for environmental allergies, food allergies and immunocompromised state. Neurological: Negative for dizziness, tremors, seizures, syncope, facial asymmetry, speech difficulty, weakness, light-headedness, numbness and headaches. Hematological: Negative for adenopathy. Does not bruise/bleed easily. Psychiatric/Behavioral: Negative for agitation, behavioral problems, confusion, decreased concentration, sleep disturbance and suicidal ideas. The patient is not nervous/anxious. Vitals:    04/29/19 1307   BP: 139/73   Pulse: 96   SpO2: 97%   Weight: 143 lb 9.6 oz (65.1 kg)   Height: 5' 4\" (1.626 m)     Body mass index is 24.65 kg/m². Wt Readings from Last 3 Encounters:   04/29/19 143 lb 9.6 oz (65.1 kg)   04/19/19 141 lb 12.8 oz (64.3 kg)   04/19/19 144 lb (65.3 kg)     BP Readings from Last 3 Encounters:   04/29/19 139/73   04/21/19 (!) 131/58   04/19/19 (!) 162/77       Physical Exam   Constitutional: She is oriented to person, place, and time. No distress. HENT:   Head: Normocephalic and atraumatic. Right Ear: External ear normal.   Left Ear: External ear normal.   Eyes: Pupils are equal, round, and reactive to light. Conjunctivae and EOM are normal.   Neck: Normal range of motion. No tracheal deviation present. No thyromegaly present. Cardiovascular: Normal rate, regular rhythm and intact distal pulses. Exam reveals no gallop and no friction rub. No murmur heard. Pulmonary/Chest: No stridor. No respiratory distress. She has no wheezes. She has no rales. She exhibits no tenderness. Abdominal: Soft.  Bowel sounds are normal. She exhibits no distension. There is no tenderness. There is no rebound. Musculoskeletal: Normal range of motion. Lymphadenopathy:     She has no cervical adenopathy. Neurological: She is alert and oriented to person, place, and time. She displays normal reflexes. No cranial nerve deficit. She exhibits normal muscle tone. Skin: Skin is warm. No rash noted. No erythema. No pallor. Assessment/Plan:   Diagnosis Orders   1. Urinary tract infection without hematuria, site unspecified  KS DISCHARGE MEDS RECONCILED W/ CURRENT OUTPATIENT MED LIST   2. Recurrent UTI (urinary tract infection)  KS DISCHARGE MEDS RECONCILED W/ CURRENT OUTPATIENT MED LIST   3. Dementia without behavioral disturbance, unspecified dementia type  KS DISCHARGE MEDS RECONCILED W/ CURRENT OUTPATIENT MED LIST   4. Weakness  KS DISCHARGE MEDS RECONCILED W/ CURRENT OUTPATIENT MED LIST         Medical Decision Making: moderate complexity      Advance Care Planning   Advanced Care Planning: Discussed the patients choices for care and treatment in case of a health event that adversely affects decision-making abilities. Also discussed the patients long-term treatment options. Reviewed with the patient the [address] [place] [address] Declaration forms  Reviewed the process of designating a competent adult as an Agent (or -in-fact) that could take make health care decisions for the patient if incompetent. Patient was asked to complete the declaration forms, either acknowledge the forms by a public notary or an eligible witness and provide a signed copy to the practice office.   Time spent (minutes): 5

## 2019-05-02 RX ORDER — HYDRALAZINE HYDROCHLORIDE 25 MG/1
TABLET, FILM COATED ORAL
Qty: 90 TABLET | Refills: 0 | Status: SHIPPED | OUTPATIENT
Start: 2019-05-02 | End: 2019-06-20 | Stop reason: SDUPTHER

## 2019-05-17 DIAGNOSIS — R32 URINARY INCONTINENCE, UNSPECIFIED TYPE: Primary | ICD-10-CM

## 2019-05-17 PROBLEM — N30.20 CHRONIC CYSTITIS: Status: ACTIVE | Noted: 2019-05-17

## 2019-05-17 PROBLEM — N28.1 RENAL CYST, LEFT: Status: ACTIVE | Noted: 2019-05-17

## 2019-05-17 PROBLEM — N13.30 HYDRONEPHROSIS, RIGHT: Status: ACTIVE | Noted: 2019-05-17

## 2019-05-17 PROBLEM — I10 HYPERTENSION: Status: ACTIVE | Noted: 2019-05-17

## 2019-05-17 PROBLEM — N39.3 STRESS INCONTINENCE OF URINE: Status: ACTIVE | Noted: 2019-05-17

## 2019-05-17 PROBLEM — N28.1 ACQUIRED CYSTIC KIDNEY DISEASE: Status: ACTIVE | Noted: 2019-05-17

## 2019-05-17 PROBLEM — N39.41 URGE INCONTINENCE OF URINE: Status: ACTIVE | Noted: 2019-05-17

## 2019-05-17 PROBLEM — N31.9 NEUROGENIC BLADDER: Status: ACTIVE | Noted: 2019-05-17

## 2019-05-17 PROBLEM — N20.0 CALCULUS OF KIDNEY: Status: ACTIVE | Noted: 2019-05-17

## 2019-05-19 PROBLEM — N39.0 UTI (URINARY TRACT INFECTION): Status: RESOLVED | Noted: 2019-04-19 | Resolved: 2019-05-19

## 2019-06-14 ENCOUNTER — OFFICE VISIT (OUTPATIENT)
Dept: FAMILY MEDICINE CLINIC | Age: 84
End: 2019-06-14
Payer: MEDICARE

## 2019-06-14 VITALS
HEIGHT: 64 IN | HEART RATE: 84 BPM | BODY MASS INDEX: 24.24 KG/M2 | OXYGEN SATURATION: 98 % | WEIGHT: 142 LBS | SYSTOLIC BLOOD PRESSURE: 163 MMHG | DIASTOLIC BLOOD PRESSURE: 76 MMHG

## 2019-06-14 DIAGNOSIS — Z13.29 SCREENING FOR THYROID DISORDER: ICD-10-CM

## 2019-06-14 DIAGNOSIS — Z95.1 S/P CABG X 2: ICD-10-CM

## 2019-06-14 DIAGNOSIS — R73.9 HYPERGLYCEMIA: ICD-10-CM

## 2019-06-14 DIAGNOSIS — E53.9 VITAMIN B DEFICIENCY: ICD-10-CM

## 2019-06-14 DIAGNOSIS — N28.9 RENAL INSUFFICIENCY: ICD-10-CM

## 2019-06-14 DIAGNOSIS — Z98.890 HX OF MITRAL VALVE REPAIR: ICD-10-CM

## 2019-06-14 DIAGNOSIS — I05.9 PULMONARY HYPERTENSION DUE TO MITRAL VALVE DISEASE (HCC): Primary | Chronic | ICD-10-CM

## 2019-06-14 DIAGNOSIS — K21.9 GASTROESOPHAGEAL REFLUX DISEASE, ESOPHAGITIS PRESENCE NOT SPECIFIED: ICD-10-CM

## 2019-06-14 DIAGNOSIS — E55.9 VITAMIN D DEFICIENCY: ICD-10-CM

## 2019-06-14 DIAGNOSIS — F01.50 VASCULAR DEMENTIA WITHOUT BEHAVIORAL DISTURBANCE (HCC): ICD-10-CM

## 2019-06-14 DIAGNOSIS — E78.5 DYSLIPIDEMIA: ICD-10-CM

## 2019-06-14 DIAGNOSIS — I10 ESSENTIAL HYPERTENSION: ICD-10-CM

## 2019-06-14 DIAGNOSIS — R32 URINARY INCONTINENCE, UNSPECIFIED TYPE: ICD-10-CM

## 2019-06-14 DIAGNOSIS — M81.0 OSTEOPOROSIS WITHOUT CURRENT PATHOLOGICAL FRACTURE, UNSPECIFIED OSTEOPOROSIS TYPE: ICD-10-CM

## 2019-06-14 DIAGNOSIS — I27.22 PULMONARY HYPERTENSION DUE TO MITRAL VALVE DISEASE (HCC): Primary | Chronic | ICD-10-CM

## 2019-06-14 PROCEDURE — 99213 OFFICE O/P EST LOW 20 MIN: CPT | Performed by: FAMILY MEDICINE

## 2019-06-14 RX ORDER — CEPHALEXIN 250 MG/1
CAPSULE ORAL
COMMUNITY
Start: 2019-06-11 | End: 2019-06-14

## 2019-06-14 ASSESSMENT — ENCOUNTER SYMPTOMS
RECTAL PAIN: 0
BACK PAIN: 0
ABDOMINAL DISTENTION: 0
VOICE CHANGE: 0
NAUSEA: 0
EYE REDNESS: 0
ABDOMINAL PAIN: 0
DIARRHEA: 0
COLOR CHANGE: 0
EYE DISCHARGE: 0
SINUS PRESSURE: 0
CONSTIPATION: 0
TROUBLE SWALLOWING: 0
ANAL BLEEDING: 0
COUGH: 0
BLOOD IN STOOL: 0
VOMITING: 0
EYE PAIN: 0
SHORTNESS OF BREATH: 0
CHEST TIGHTNESS: 0

## 2019-06-14 ASSESSMENT — PATIENT HEALTH QUESTIONNAIRE - PHQ9
SUM OF ALL RESPONSES TO PHQ QUESTIONS 1-9: 0
SUM OF ALL RESPONSES TO PHQ9 QUESTIONS 1 & 2: 0
1. LITTLE INTEREST OR PLEASURE IN DOING THINGS: 0
2. FEELING DOWN, DEPRESSED OR HOPELESS: 0
SUM OF ALL RESPONSES TO PHQ QUESTIONS 1-9: 0

## 2019-06-14 NOTE — PROGRESS NOTES
Visit Information    Have you changed or started any medications since your last visit including any over-the-counter medicines, vitamins, or herbal medicines? yes -    Are you having any side effects from any of your medications? -  no  Have you stopped taking any of your medications? Is so, why? -  no    Have you seen any other physician or provider since your last visit? Yes - Records Obtained  Have you had any other diagnostic tests since your last visit? No  Have you been seen in the emergency room and/or had an admission to a hospital since we last saw you? No  Have you had your routine dental cleaning in the past 6 months? no    Have you activated your [employer] account? If not, what are your barriers?  Yes     Patient Care Team:  Lana Escobar MD as PCP - Gabby Kilpatrick MD as PCP - [facility]    Medical History Review  Past Medical, Family, and Social History reviewed and does contribute to the patient presenting condition    Health Maintenance   Topic Date Due    DTaP/Tdap/Td vaccine (1 - Tdap) 03[birth year]14/2020 (Originally 1/12/1948)    Shingles Vaccine (1 of 2) 03/31/2020 (Originally 1/12/1979)    Creatinine monitoring  04/16/2020    Potassium monitoring  04/19/2020    Flu vaccine  Completed    Pneumococcal 65+ years Vaccine  Completed

## 2019-06-14 NOTE — PROGRESS NOTES
Subjective:      Patient ID: Alan Reich is a 80 y.o. female. HPI Has been doing well . Recently updated the 2 nd shingles shot at his pharmacy. States has been sleeping well, mood, appetite ok. No recent falls. Memory has been stable and mood ok as well. States no recent UTI as well. Has appt with Urology in a couple of weeks. Review of Systems   Constitutional: Negative for activity change, appetite change and fatigue. HENT: Negative for dental problem, ear pain, hearing loss, postnasal drip, sinus pressure, sneezing, tinnitus, trouble swallowing and voice change. Eyes: Negative for pain, discharge, redness and visual disturbance. Respiratory: Negative for cough, chest tightness and shortness of breath. Cardiovascular: Negative for chest pain, palpitations and leg swelling. Gastrointestinal: Negative for abdominal distention, abdominal pain, anal bleeding, blood in stool, constipation, diarrhea, nausea, rectal pain and vomiting. Endocrine: Negative for cold intolerance, heat intolerance, polydipsia, polyphagia and polyuria. Genitourinary: Negative for decreased urine volume, difficulty urinating, dyspareunia, dysuria, enuresis, flank pain, frequency, genital sores, hematuria, menstrual problem, pelvic pain, urgency, vaginal bleeding and vaginal discharge. Musculoskeletal: Negative for arthralgias, back pain, gait problem, joint swelling, myalgias, neck pain and neck stiffness. Skin: Negative for color change, pallor and rash. Allergic/Immunologic: Negative for environmental allergies, food allergies and immunocompromised state. Neurological: Negative for dizziness, tremors, seizures, syncope, facial asymmetry, speech difficulty, weakness, light-headedness, numbness and headaches. Hematological: Negative for adenopathy. Does not bruise/bleed easily.    Psychiatric/Behavioral: Negative for agitation, behavioral problems, confusion, decreased concentration, sleep disturbance and suicidal ideas. The patient is not nervous/anxious. Objective:   Physical Exam   Constitutional: She is oriented to person, place, and time. No distress. HENT:   Head: Normocephalic and atraumatic. Right Ear: External ear normal.   Left Ear: External ear normal.   Eyes: Pupils are equal, round, and reactive to light. Conjunctivae and EOM are normal.   Neck: Normal range of motion. No tracheal deviation present. No thyromegaly present. Cardiovascular: Normal rate, regular rhythm and intact distal pulses. Exam reveals no gallop and no friction rub. No murmur heard. Pulmonary/Chest: No stridor. No respiratory distress. She has no wheezes. She has no rales. She exhibits no tenderness. Abdominal: Soft. Bowel sounds are normal. She exhibits no distension. There is no tenderness. There is no rebound. Musculoskeletal: Normal range of motion. Lymphadenopathy:     She has no cervical adenopathy. Neurological: She is alert and oriented to person, place, and time. She displays normal reflexes. No cranial nerve deficit. She exhibits normal muscle tone. Skin: Skin is warm. No rash noted. No erythema. No pallor. Assessment:       Diagnosis Orders   1. Pulmonary hypertension due to mitral valve disease ([address]     2. Renal insufficiency     3. Gastroesophageal reflux disease, esophagitis presence not specified     4. S/P CABG x 2     5. Hx of mitral valve repair     6. Osteoporosis without current pathological fracture, unspecified osteoporosis type     7. Urinary incontinence, unspecified type     8. Essential hypertension     9. Vascular dementia without behavioral disturbance     10. Dyslipidemia           Plan:      No orders of the defined types were placed in this encounter.       Outpatient Encounter Medications as of 6/14/2019   Medication Sig Dispense Refill    hydrALAZINE (APRESOLINE) 25 MG tablet TAKE 1 TABLET BY MOUTH 3 TIMES DAILY AS NEEDED (SBP >140) 90 tablet 0    diphenhydrAMINE (BENADRYL) 50 MG/ML injection       ceFEPIme (MAXIPIME) 2 g injection       trospium (SANCTURA) 20 MG tablet Take 1 tablet by mouth 2 times daily (before meals) 60 tablet 3    pantoprazole (PROTONIX) 40 MG tablet Take 1 tablet by mouth every morning (before breakfast) 30 tablet 3    memantine (NAMENDA) 10 MG tablet Take 1 tablet by mouth 2 times daily 60 tablet 3    chlorthalidone (HYGROTON) 25 MG tablet Take 12.5 mg by mouth Twice a Week Indications: on thursday and sunday mornings      darifenacin (ENABLEX) 7.5 MG extended release tablet Take 7.5 mg by mouth daily      memantine (NAMENDA) 10 MG tablet Take 10 mg by mouth 2 times daily      donepezil (ARICEPT) 10 MG tablet Take 1 tablet by mouth nightly 90 tablet 3    Lactobacillus (ACIDOPHILUS) CAPS capsule Take 2 capsules by mouth daily 60 capsule 2    nitroGLYCERIN (NITROSTAT) 0.4 MG SL tablet Place 1 tablet under the tongue as needed for Chest pain 25 tablet 3    ezetimibe (ZETIA) 10 MG tablet Take 1 tablet by mouth daily 30 tablet 3    Multiple Vitamins-Minerals (CENTRUM SILVER) TABS Take 1 tablet by mouth daily 30 tablet 3    omeprazole (PRILOSEC) 20 MG capsule Take 1 capsule by mouth 2 times daily 180 capsule 1    aspirin 81 MG tablet Take 1 tablet by mouth daily 30 tablet 3    latanoprost (XALATAN) 0.005 % ophthalmic solution Place 1 drop into both eyes nightly       [DISCONTINUED] cephALEXin (KEFLEX) 250 MG capsule        No facility-administered encounter medications on file as of 6/14/2019. Gaby Fiore MD Advance Care Planning   Advanced Care Planning: Discussed the patients choices for care and treatment in case of a health event that adversely affects decision-making abilities. Also discussed the patients long-term treatment options.  Reviewed with the patient the [address] [facility] Will Declaration forms  Reviewed the process of designating a competent adult as an Agent (or -in-fact) that could take make health care decisions for the patient if incompetent. Patient was asked to complete the declaration forms, either acknowledge the forms by a public notary or an eligible witness and provide a signed copy to the practice office.   Time spent (minutes): 2 min , has POA  and LW

## 2019-06-17 ENCOUNTER — HOSPITAL ENCOUNTER (OUTPATIENT)
Age: 84
Setting detail: SPECIMEN
Discharge: HOME | End: 2019-06-17
Payer: MEDICARE

## 2019-06-17 DIAGNOSIS — E53.9 VITAMIN B DEFICIENCY: ICD-10-CM

## 2019-06-17 DIAGNOSIS — N28.9 RENAL INSUFFICIENCY: ICD-10-CM

## 2019-06-17 DIAGNOSIS — Z13.29 SCREENING FOR THYROID DISORDER: ICD-10-CM

## 2019-06-17 DIAGNOSIS — E78.5 DYSLIPIDEMIA: ICD-10-CM

## 2019-06-17 DIAGNOSIS — R73.9 HYPERGLYCEMIA: ICD-10-CM

## 2019-06-17 DIAGNOSIS — E55.9 VITAMIN D DEFICIENCY: ICD-10-CM

## 2019-06-17 LAB
ALBUMIN SERPL-MCNC: 4.3 G/DL (ref 3.5–5.2)
ALBUMIN/GLOBULIN RATIO: 1.6 (ref 1–2.5)
ALP BLD-CCNC: 56 U/L (ref 35–104)
ALT SERPL-CCNC: 13 U/L (ref 5–33)
ANION GAP SERPL CALCULATED.3IONS-SCNC: 13 MMOL/L (ref 9–17)
AST SERPL-CCNC: 17 U/L
BILIRUB SERPL-MCNC: 0.38 MG/DL (ref 0.3–1.2)
BUN BLDV-MCNC: 18 MG/DL (ref 8–23)
BUN/CREAT BLD: ABNORMAL (ref 9–20)
CALCIUM SERPL-MCNC: 9.9 MG/DL (ref 8.6–10.4)
CHLORIDE BLD-SCNC: 100 MMOL/L (ref 98–107)
CHOLESTEROL/HDL RATIO: 4.5
CHOLESTEROL: 193 MG/DL
CO2: 28 MMOL/L (ref 20–31)
CREAT SERPL-MCNC: 0.95 MG/DL (ref 0.5–0.9)
ESTIMATED AVERAGE GLUCOSE: 105 MG/DL
FOLATE: >20 NG/ML
GFR AFRICAN AMERICAN: >60 ML/MIN
GFR NON-AFRICAN AMERICAN: 55 ML/MIN
GFR SERPL CREATININE-BSD FRML MDRD: ABNORMAL ML/MIN/{1.73_M2}
GFR SERPL CREATININE-BSD FRML MDRD: ABNORMAL ML/MIN/{1.73_M2}
GLUCOSE BLD-MCNC: 97 MG/DL (ref 70–99)
HBA1C MFR BLD: 5.3 % (ref 4–6)
HCT VFR BLD CALC: 46.3 % (ref 36.3–47.1)
HDLC SERPL-MCNC: 43 MG/DL
HEMOGLOBIN: 14.2 G/DL (ref 11.9–15.1)
LDL CHOLESTEROL: 122 MG/DL (ref 0–130)
MCH RBC QN AUTO: 30.3 PG (ref 25.2–33.5)
MCHC RBC AUTO-ENTMCNC: 30.7 G/DL (ref 28.4–34.8)
MCV RBC AUTO: 98.9 FL (ref 82.6–102.9)
NRBC AUTOMATED: 0 PER 100 WBC
PDW BLD-RTO: 13.4 % (ref 11.8–14.4)
PLATELET # BLD: 229 K/UL (ref 138–453)
PMV BLD AUTO: 11 FL (ref 8.1–13.5)
POTASSIUM SERPL-SCNC: 4.2 MMOL/L (ref 3.7–5.3)
RBC # BLD: 4.68 M/UL (ref 3.95–5.11)
SODIUM BLD-SCNC: 141 MMOL/L (ref 135–144)
THYROXINE, FREE: 1.55 NG/DL (ref 0.93–1.7)
TOTAL PROTEIN: 7 G/DL (ref 6.4–8.3)
TRIGL SERPL-MCNC: 140 MG/DL
TSH SERPL DL<=0.05 MIU/L-ACNC: 2.09 MIU/L (ref 0.3–5)
VITAMIN B-12: 1238 PG/ML (ref 232–1245)
VITAMIN D 25-HYDROXY: 43.3 NG/ML (ref 30–100)
VLDLC SERPL CALC-MCNC: NORMAL MG/DL (ref 1–30)
WBC # BLD: 4.7 K/UL (ref 3.5–11.3)

## 2019-06-20 RX ORDER — HYDRALAZINE HYDROCHLORIDE 25 MG/1
TABLET, FILM COATED ORAL
Qty: 90 TABLET | Refills: 0 | Status: SHIPPED | OUTPATIENT
Start: 2019-06-20 | End: 2019-07-19 | Stop reason: SDUPTHER

## 2019-06-20 NOTE — TELEPHONE ENCOUNTER
[facility] is calling to request a refill on the following medication(s):  Requested Prescriptions     Pending Prescriptions Disp Refills    hydrALAZINE (APRESOLINE) 25 MG tablet [Pharmacy Med Name: HYDRALAZINE 25MG] 90 tablet 0     Sig: TAKE 1 TABLET BY MOUTH 3 TIMES DAILY AS NEEDED (SBP >140)       Last Visit Date (If Applicable):  9/08/8515    Next Visit Date:    9/18/2019

## 2019-07-19 RX ORDER — HYDRALAZINE HYDROCHLORIDE 25 MG/1
TABLET, FILM COATED ORAL
Qty: 90 TABLET | Refills: 0 | Status: SHIPPED | OUTPATIENT
Start: 2019-07-19 | End: 2019-09-20 | Stop reason: SDUPTHER

## 2019-07-19 NOTE — TELEPHONE ENCOUNTER
[facility] is calling to request a refill on the following medication(s):  Requested Prescriptions     Pending Prescriptions Disp Refills    hydrALAZINE (APRESOLINE) 25 MG tablet [Pharmacy Med Name: HYDRALAZINE 25MG] 90 tablet 0     Sig: TAKE 1 TABLET BY MOUTH 3 TIMES DAILY AS NEEDED (SBP >140)       Last Visit Date (If Applicable):  3/98/6703    Next Visit Date:    9/18/2019

## 2019-09-12 RX ORDER — MEMANTINE HYDROCHLORIDE 10 MG/1
TABLET ORAL
Qty: 60 TABLET | Refills: 3 | Status: ON HOLD | OUTPATIENT
Start: 2019-09-12 | End: 2020-10-23

## 2019-09-18 ENCOUNTER — OFFICE VISIT (OUTPATIENT)
Dept: FAMILY MEDICINE CLINIC | Age: 84
End: 2019-09-18
Payer: MEDICARE

## 2019-09-18 VITALS
SYSTOLIC BLOOD PRESSURE: 168 MMHG | BODY MASS INDEX: 24.07 KG/M2 | OXYGEN SATURATION: 96 % | HEIGHT: 64 IN | WEIGHT: 141 LBS | DIASTOLIC BLOOD PRESSURE: 77 MMHG | HEART RATE: 83 BPM

## 2019-09-18 DIAGNOSIS — K21.9 GASTROESOPHAGEAL REFLUX DISEASE, ESOPHAGITIS PRESENCE NOT SPECIFIED: ICD-10-CM

## 2019-09-18 DIAGNOSIS — Z98.890 HX OF MITRAL VALVE REPAIR: ICD-10-CM

## 2019-09-18 DIAGNOSIS — Z95.1 S/P CABG X 2: ICD-10-CM

## 2019-09-18 DIAGNOSIS — M81.0 OSTEOPOROSIS WITHOUT CURRENT PATHOLOGICAL FRACTURE, UNSPECIFIED OSTEOPOROSIS TYPE: ICD-10-CM

## 2019-09-18 DIAGNOSIS — R32 URINARY INCONTINENCE, UNSPECIFIED TYPE: ICD-10-CM

## 2019-09-18 DIAGNOSIS — N28.9 RENAL INSUFFICIENCY: ICD-10-CM

## 2019-09-18 DIAGNOSIS — N39.0 RECURRENT UTI: ICD-10-CM

## 2019-09-18 DIAGNOSIS — E78.5 DYSLIPIDEMIA: ICD-10-CM

## 2019-09-18 DIAGNOSIS — I27.22 PULMONARY HYPERTENSION DUE TO MITRAL VALVE DISEASE (HCC): Primary | Chronic | ICD-10-CM

## 2019-09-18 DIAGNOSIS — F01.50 VASCULAR DEMENTIA WITHOUT BEHAVIORAL DISTURBANCE (HCC): ICD-10-CM

## 2019-09-18 DIAGNOSIS — Z88.9 DRUG ALLERGY, MULTIPLE: ICD-10-CM

## 2019-09-18 DIAGNOSIS — I10 ESSENTIAL HYPERTENSION: ICD-10-CM

## 2019-09-18 DIAGNOSIS — R33.9 URINARY RETENTION WITH INCOMPLETE BLADDER EMPTYING: ICD-10-CM

## 2019-09-18 DIAGNOSIS — I05.9 PULMONARY HYPERTENSION DUE TO MITRAL VALVE DISEASE (HCC): Primary | Chronic | ICD-10-CM

## 2019-09-18 PROCEDURE — 99213 OFFICE O/P EST LOW 20 MIN: CPT | Performed by: FAMILY MEDICINE

## 2019-09-18 RX ORDER — CEPHALEXIN 250 MG/1
250 CAPSULE ORAL DAILY
Status: ON HOLD | COMMUNITY
Start: 2019-09-12 | End: 2020-10-29 | Stop reason: HOSPADM

## 2019-09-18 ASSESSMENT — PATIENT HEALTH QUESTIONNAIRE - PHQ9
1. LITTLE INTEREST OR PLEASURE IN DOING THINGS: 0
SUM OF ALL RESPONSES TO PHQ QUESTIONS 1-9: 0
SUM OF ALL RESPONSES TO PHQ QUESTIONS 1-9: 0
SUM OF ALL RESPONSES TO PHQ9 QUESTIONS 1 & 2: 0
2. FEELING DOWN, DEPRESSED OR HOPELESS: 0

## 2019-09-18 ASSESSMENT — ENCOUNTER SYMPTOMS
BACK PAIN: 0
SINUS PRESSURE: 0
NAUSEA: 0
EYE DISCHARGE: 0
VOICE CHANGE: 0
CONSTIPATION: 0
EYE REDNESS: 0
TROUBLE SWALLOWING: 0
SHORTNESS OF BREATH: 0
COLOR CHANGE: 0
BLOOD IN STOOL: 0
RECTAL PAIN: 0
ANAL BLEEDING: 0
VOMITING: 0
CHEST TIGHTNESS: 0
ABDOMINAL PAIN: 0
DIARRHEA: 0
ABDOMINAL DISTENTION: 0
COUGH: 0
EYE PAIN: 0

## 2019-09-18 NOTE — PROGRESS NOTES
Visit Information    Have you changed or started any medications since your last visit including any over-the-counter medicines, vitamins, or herbal medicines? no   Are you having any side effects from any of your medications? -  no  Have you stopped taking any of your medications? Is so, why? -  no    Have you seen any other physician or provider since your last visit? No  Have you had any other diagnostic tests since your last visit? No  Have you been seen in the emergency room and/or had an admission to a hospital since we last saw you? No  Have you had your routine dental cleaning in the past 6 months? no    Have you activated your [employer] account? If not, what are your barriers?  Yes     Patient Care Team:  Shahla De La Rosa MD as PCP - Brenda Kay MD as PCP - [facility]    Medical History Review  Past Medical, Family, and Social History reviewed and does contribute to the patient presenting condition    Health Maintenance   Topic Date Due    Flu vaccine (1) 09/01/2019    DTaP/Tdap/Td vaccine (1 - Tdap) 03/14/2020 (Originally 1/12/1948)    Annual Wellness Visit (AWV)  11/26/2019    Potassium monitoring  06/17/2020    Creatinine monitoring  06/17/2020    Shingles Vaccine  Completed    Pneumococcal 65+ years Vaccine  Completed

## 2019-09-21 RX ORDER — HYDRALAZINE HYDROCHLORIDE 25 MG/1
TABLET, FILM COATED ORAL
Qty: 90 TABLET | Refills: 0 | Status: SHIPPED | OUTPATIENT
Start: 2019-09-21 | End: 2019-11-12 | Stop reason: SDUPTHER

## 2019-11-12 RX ORDER — HYDRALAZINE HYDROCHLORIDE 25 MG/1
TABLET, FILM COATED ORAL
Qty: 90 TABLET | Refills: 0 | Status: SHIPPED | OUTPATIENT
Start: 2019-11-12 | End: 2019-12-27

## 2019-12-18 ENCOUNTER — OFFICE VISIT (OUTPATIENT)
Dept: FAMILY MEDICINE CLINIC | Age: 84
End: 2019-12-18
Payer: COMMERCIAL

## 2019-12-18 VITALS
SYSTOLIC BLOOD PRESSURE: 145 MMHG | DIASTOLIC BLOOD PRESSURE: 81 MMHG | HEIGHT: 64 IN | OXYGEN SATURATION: 98 % | HEART RATE: 98 BPM | BODY MASS INDEX: 23.23 KG/M2 | WEIGHT: 136.1 LBS

## 2019-12-18 DIAGNOSIS — N28.9 RENAL INSUFFICIENCY: ICD-10-CM

## 2019-12-18 DIAGNOSIS — Z95.1 S/P CABG X 2: ICD-10-CM

## 2019-12-18 DIAGNOSIS — I05.9 PULMONARY HYPERTENSION DUE TO MITRAL VALVE DISEASE (HCC): Primary | Chronic | ICD-10-CM

## 2019-12-18 DIAGNOSIS — E78.5 DYSLIPIDEMIA: ICD-10-CM

## 2019-12-18 DIAGNOSIS — Z98.890 HX OF MITRAL VALVE REPAIR: ICD-10-CM

## 2019-12-18 DIAGNOSIS — K21.9 GASTROESOPHAGEAL REFLUX DISEASE, ESOPHAGITIS PRESENCE NOT SPECIFIED: ICD-10-CM

## 2019-12-18 DIAGNOSIS — I10 ESSENTIAL HYPERTENSION: ICD-10-CM

## 2019-12-18 DIAGNOSIS — I27.22 PULMONARY HYPERTENSION DUE TO MITRAL VALVE DISEASE (HCC): Primary | Chronic | ICD-10-CM

## 2019-12-18 DIAGNOSIS — F01.50 VASCULAR DEMENTIA WITHOUT BEHAVIORAL DISTURBANCE (HCC): ICD-10-CM

## 2019-12-18 PROBLEM — B96.5 PSEUDOMONAS URINARY TRACT INFECTION: Status: RESOLVED | Noted: 2019-04-19 | Resolved: 2019-12-18

## 2019-12-18 PROBLEM — N39.0 PSEUDOMONAS URINARY TRACT INFECTION: Status: RESOLVED | Noted: 2019-04-19 | Resolved: 2019-12-18

## 2019-12-18 PROCEDURE — 99213 OFFICE O/P EST LOW 20 MIN: CPT | Performed by: FAMILY MEDICINE

## 2019-12-18 ASSESSMENT — ENCOUNTER SYMPTOMS
SINUS PRESSURE: 0
ABDOMINAL DISTENTION: 0
BACK PAIN: 0
NAUSEA: 0
CHEST TIGHTNESS: 0
SHORTNESS OF BREATH: 0
EYE REDNESS: 0
DIARRHEA: 0
VOICE CHANGE: 0
EYE PAIN: 0
ABDOMINAL PAIN: 0
VOMITING: 0
TROUBLE SWALLOWING: 0
EYE DISCHARGE: 0
ANAL BLEEDING: 0
BLOOD IN STOOL: 0
RECTAL PAIN: 0
COLOR CHANGE: 0
COUGH: 0
CONSTIPATION: 0

## 2019-12-18 ASSESSMENT — PATIENT HEALTH QUESTIONNAIRE - PHQ9
2. FEELING DOWN, DEPRESSED OR HOPELESS: 0
SUM OF ALL RESPONSES TO PHQ9 QUESTIONS 1 & 2: 0
SUM OF ALL RESPONSES TO PHQ QUESTIONS 1-9: 0
SUM OF ALL RESPONSES TO PHQ QUESTIONS 1-9: 0
1. LITTLE INTEREST OR PLEASURE IN DOING THINGS: 0

## 2019-12-27 RX ORDER — HYDRALAZINE HYDROCHLORIDE 25 MG/1
TABLET, FILM COATED ORAL
Qty: 90 TABLET | Refills: 0 | Status: ON HOLD | OUTPATIENT
Start: 2019-12-27 | End: 2020-10-23

## 2020-02-10 ENCOUNTER — TELEPHONE (OUTPATIENT)
Dept: FAMILY MEDICINE CLINIC | Age: 85
End: 2020-02-10

## 2020-02-11 NOTE — TELEPHONE ENCOUNTER
They can look into several long term care facilities , may need to check the expenses as diff facilities charge differently. Sedatives only recommended if she is in hospice as she has high risk for falls with such medications and increased confusion and need to be closely monitored in that case.

## 2020-03-25 ENCOUNTER — TELEPHONE (OUTPATIENT)
Dept: FAMILY MEDICINE CLINIC | Age: 85
End: 2020-03-25

## 2020-03-25 PROBLEM — K21.9 GERD (GASTROESOPHAGEAL REFLUX DISEASE): Status: RESOLVED | Noted: 2020-03-25 | Resolved: 2020-03-24

## 2020-05-18 ENCOUNTER — TELEPHONE (OUTPATIENT)
Dept: FAMILY MEDICINE CLINIC | Age: 85
End: 2020-05-18

## 2020-05-18 NOTE — PROGRESS NOTES
rash    Sulfa Antibiotics Rash and Itching     Other reaction(s): Unknown       Past Medical History:   Diagnosis Date    Abnormal cardiovascular stress test 2/28/2014    Acute confusion     Acute coronary syndrome ([address] 4/25/2014    Acute right-sided CHF (congestive heart failure) ([address] 2/28/2014    Acute on chronic assoc w/ chronic MV disease     Angina pectoris ([address] 4/22/2016    worse     Arthritis     CAD (coronary artery disease)     Calculus of kidney 5/17/2019    CHF (congestive heart failure) (HCC)     Chronic cystitis 5/17/2019    Chronic renal failure, stage 3 (moderate) ([address] 4/19/2019    Dementia ([address]     Dyslipidemia     Essential hypertension 12/4/2015    GERD (gastroesophageal reflux disease)     GERD (gastroesophageal reflux disease)     Hx of mitral valve repair 2/28/2014    And annuloplasty ring     Hypertension     Hypokalemia     Neurogenic bladder 5/17/2019    Osteoporosis     Pulmonary hypertension due to mitral valve disease ([address] 2/28/2014    Renal cyst, left 5/17/2019    Renal insufficiency     Right HF (heart failure) secondary to left HF (heart failure) ([address] 2/28/2014    S/P CABG x 2 2/28/2014    LIMA-LAD (occluded), SVG-RCA (patent 2/14 cath)     Stress incontinence of urine 5/17/2019    Unstable angina ([address] 5/29/2015    Urge incontinence of urine 5/17/2019    Urinary incontinence     UTI (urinary tract infection) 4/19/2019    Vascular dementia without behavioral disturbance ([address] 12/4/2015       Past Surgical History:   Procedure Laterality Date    APPENDECTOMY      CARDIAC CATHETERIZATION  4-    CORONARY ARTERY BYPASS GRAFT N/A 2003    x2 with mitro valve repair    DILATION AND CURETTAGE OF UTERUS      EYE SURGERY Right     tear duct    HERNIA REPAIR      HYSTERECTOMY         Social History     Socioeconomic History    Marital status:      Spouse name: Tasha Werner Number of children: 2    Years of education: 12    Highest education level: Not on file   Occupational History    Occupation: RETIRED   Social Needs    Financial resource strain: Not on file    Food insecurity     Worry: Not on file     Inability: Not on file   [employer] needs     Medical: Not on file     Non-medical: Not on file   Tobacco Use    Smoking status: Never Smoker    Smokeless tobacco: Never Used   Substance and Sexual Activity    Alcohol use: No     Comment: very little    Drug use: No    Sexual activity: Not Currently   Lifestyle    Physical activity     Days per week: Not on file     Minutes per session: Not on file    Stress: Not on file   Relationships    Social connections     Talks on phone: Not on file     Gets together: Not on file     Attends [employer] service: Not on file     Active member of club or organization: Not on file     Attends meetings of clubs or organizations: Not on file     Relationship status: Not on file    Intimate partner violence     Fear of current or ex partner: Not on file     Emotionally abused: Not on file     Physically abused: Not on file     Forced sexual activity: Not on file   Other Topics Concern    Not on file   Social History Narrative    Not on file        Family History   Problem Relation Age of Onset    Diabetes Mother     Cancer Father     Anemia Father        ADVANCE DIRECTIVE: N, Not Received    There were no vitals filed for this visit. Estimated body mass index is 23.36 kg/m² as calculated from the following:    Height as of 12/18/19: 5' 4\" (1.626 m). Weight as of 12/18/19: 136 lb 1.6 oz (61.7 kg). Physical Exam    No flowsheet data found.     Lab Results   Component Value Date    CHOL 193 06/17/2019    CHOL 163 03/11/2019    CHOL 183 07/17/2018    TRIG 140 06/17/2019    TRIG 108 03/11/2019    TRIG 140 07/17/2018    HDL 43 06/17/2019    HDL 44 03/11/2019    HDL 48 07/17/2018    LDLCHOLESTEROL 122 06/17/2019    LDLCHOLESTEROL 97 03/11/2019    LDLCHOLESTEROL 107 07/17/2018    GLUCOSE

## 2020-05-18 NOTE — TELEPHONE ENCOUNTER
We received a call from dtr: Nicole North. Needs a letter for mom's bank:  [employer]. Mom is no longer sound mind/body. DX: Dementia    In order for dtr to handle financial affairs. She stays with her 24 hrs a a day. Katie Medina, will  when ready.

## 2020-05-21 ENCOUNTER — TELEPHONE (OUTPATIENT)
Dept: FAMILY MEDICINE CLINIC | Age: 85
End: 2020-05-21

## 2020-05-22 NOTE — TELEPHONE ENCOUNTER
Ok to do what Mireya Torres thinks appropriate, may need wording of letter as well so it is worded legally

## 2020-05-22 NOTE — TELEPHONE ENCOUNTER
I received the information as of today, with the attachment of POA and death certificate of her . It is requested that you provide Alejandra Sales with a letter from a licensed physician as to the competency of Davonte Patino, with the approximate date of incompetency. As Nicole Julien is now  (2020) this is necessary so that the Power of Anahi Vasquez that she signed back in  may be used for her care. Attached is a copy of the Power of . --   Shantelle Cox, Assistant   Law Office of [address] [place] [address] [employer] [facility].  [address] [place] [address], [place] [address]  [phone]

## 2020-05-22 NOTE — TELEPHONE ENCOUNTER
I received a call from [address] [place] [address] Pretty. Stating you can have 2 POA's. It's okay for us to also write a letter for him to the bank/aguila. However, after this going forward, we can also charge per letter: $10.00.

## 2020-05-22 NOTE — TELEPHONE ENCOUNTER
Let the  know that we have an updated POA from March 2020 that shows that patients POA is his sister Kasey Stokes and send a copy of that to him. Ill look at the letter first after you print it before sending. Also check with Yuli and see how she would like us to proceed with this case ie should we get advice from legal team.

## 2020-05-26 NOTE — TELEPHONE ENCOUNTER
Received a call back from [address] [place] [address] okay for us to release a letter to the Son:  Lupillo Marie - he is the only one that has the POA for Healthcare for the pt. For the following date stating for onset of dementia.     If any questions, okay to phone Risk Management for assistance

## 2020-05-28 NOTE — TELEPHONE ENCOUNTER
I received this via E-mail from Na/Bipin. Juan Alberto Byrd:    Please share with Dr. Praful Main: The most recent POA on Wilmer Patton is dated 3-. (I also have one that Dad signed but isn't in effect any longer). This should supersede the one signed by Mom dated in 2012 that Douglas/Jhon (Carly Acosta) may have given you. (FYI: My parents also have one date in 2005 so it was not unusual for Dad/Mom to make changes.) This is turning into a financial taylor between my brother, his wife and myself and I certainly don't want you involved. Garth Burroughs confirmed your office received the POA dated 3-17-20 the day after I sent it on Tuesday, May 19th. I'm so sorry that you may have to be involved. My father entrusted me with both his and my mother's physical and financial well being. One big concern that my father expressed was that my mother be allowed to stay in her home for the remaining of her days. He also expressed this to Hospice as well. My mother's well being is a high priority of mine. Any questions, please do not hesitate to call.       Sincerely,    Shasha Hui  [phone] (Mom's)

## 2020-05-28 NOTE — TELEPHONE ENCOUNTER
[facility] took over the care of the pt on 2.14.2020 per DTR/Na. Any letters needs to be written via them since they did her last assessment. [place]. When he calls let him know the following info, per provider:  Needs info from [facility].

## 2020-05-28 NOTE — TELEPHONE ENCOUNTER
They stated that we only are concerned about POA for Healthcare. See notes before from 2 days ago. It said okay for us to give letter to there son:   Blaine Gypsy.

## 2020-06-01 NOTE — TELEPHONE ENCOUNTER
This was received via e-mail on Friday. Per [address] and HIPPA release forwarded to you on May 26, 2020, please provide Zaheer Manjarrez, her first successor patient advocate, with a copy of Romi's complete medical file, including but not limited to all notes, memos, letters and records. If you are able to provide it via email, please email [email or web address]. Otherwise, please provide copies to him at:   Zaheer Manjarrez, [facility], [address]. If you have any questions regarding this request, please feel free to contact me or Mr. Evie Martinez directly.

## 2020-06-01 NOTE — TELEPHONE ENCOUNTER
Whatever risk management wants- Please do not keep sending this to me again if RM ok, send records, if Hospice is now taking care ask RM if they have to address this with Mr Luciano Yung. I am not getting involved any more. There is nothing different I would do- I will take advice of RM,  So no more back and forth. If they are ok sending records, send, if not don't.

## 2020-08-10 ENCOUNTER — TELEPHONE (OUTPATIENT)
Dept: FAMILY MEDICINE CLINIC | Age: 85
End: 2020-08-10

## 2020-08-10 NOTE — TELEPHONE ENCOUNTER
Received a call from [address] [place] [address], Yasmine Lindsay, ALIE. To update us:   PT revoked Hospice benefits on 8.7.2020. Hospice provided Palliative Care phone number to the pt/family. Is she than automatically becoming our pt again?

## 2020-10-22 ENCOUNTER — HOSPITAL ENCOUNTER (INPATIENT)
Age: 85
LOS: 7 days | Discharge: NURSING, REHAB OR LONG-TERM CARE | DRG: 177 | End: 2020-10-29
Attending: STUDENT IN AN ORGANIZED HEALTH CARE EDUCATION/TRAINING PROGRAM | Admitting: INTERNAL MEDICINE
Payer: MEDICARE

## 2020-10-22 ENCOUNTER — APPOINTMENT (OUTPATIENT)
Dept: GENERAL RADIOLOGY | Age: 85
DRG: 177 | End: 2020-10-22
Payer: MEDICARE

## 2020-10-22 PROBLEM — U07.1 COVID-19 VIRUS INFECTION: Status: ACTIVE | Noted: 2020-10-22

## 2020-10-22 LAB
-: ABNORMAL
ABSOLUTE EOS #: <0.03 K/UL (ref 0–0.44)
ABSOLUTE IMMATURE GRANULOCYTE: 0.01 K/UL (ref 0–0.3)
ABSOLUTE LYMPH #: 1.15 K/UL (ref 1.1–3.7)
ABSOLUTE MONO #: 0.48 K/UL (ref 0.1–1.2)
AMORPHOUS: ABNORMAL
ANION GAP SERPL CALCULATED.3IONS-SCNC: 9 MMOL/L (ref 9–17)
BACTERIA: ABNORMAL
BASOPHILS # BLD: 0 % (ref 0–2)
BASOPHILS ABSOLUTE: <0.03 K/UL (ref 0–0.2)
BILIRUBIN URINE: NEGATIVE
BUN BLDV-MCNC: 12 MG/DL (ref 8–23)
BUN/CREAT BLD: 21 (ref 9–20)
CALCIUM SERPL-MCNC: 9.1 MG/DL (ref 8.6–10.4)
CASTS UA: ABNORMAL /LPF
CHLORIDE BLD-SCNC: 98 MMOL/L (ref 98–107)
CO2: 27 MMOL/L (ref 20–31)
COLOR: YELLOW
COMMENT UA: ABNORMAL
CREAT SERPL-MCNC: 0.57 MG/DL (ref 0.5–0.9)
CRYSTALS, UA: ABNORMAL /HPF
DIFFERENTIAL TYPE: ABNORMAL
EOSINOPHILS RELATIVE PERCENT: 0 % (ref 1–4)
EPITHELIAL CELLS UA: ABNORMAL /HPF (ref 0–5)
GFR AFRICAN AMERICAN: >60 ML/MIN
GFR NON-AFRICAN AMERICAN: >60 ML/MIN
GFR SERPL CREATININE-BSD FRML MDRD: ABNORMAL ML/MIN/{1.73_M2}
GFR SERPL CREATININE-BSD FRML MDRD: ABNORMAL ML/MIN/{1.73_M2}
GLUCOSE BLD-MCNC: 105 MG/DL (ref 70–99)
GLUCOSE URINE: NEGATIVE
HCT VFR BLD CALC: 40.4 % (ref 36.3–47.1)
HEMOGLOBIN: 13 G/DL (ref 11.9–15.1)
IMMATURE GRANULOCYTES: 0 %
KETONES, URINE: NEGATIVE
LACTIC ACID, SEPSIS WHOLE BLOOD: NORMAL MMOL/L (ref 0.5–1.9)
LACTIC ACID, SEPSIS: 1.5 MMOL/L (ref 0.5–1.9)
LEUKOCYTE ESTERASE, URINE: ABNORMAL
LYMPHOCYTES # BLD: 25 % (ref 24–43)
MCH RBC QN AUTO: 30.1 PG (ref 25.2–33.5)
MCHC RBC AUTO-ENTMCNC: 32.2 G/DL (ref 28.4–34.8)
MCV RBC AUTO: 93.5 FL (ref 82.6–102.9)
MONOCYTES # BLD: 11 % (ref 3–12)
MUCUS: ABNORMAL
NITRITE, URINE: NEGATIVE
NRBC AUTOMATED: 0 PER 100 WBC
OTHER OBSERVATIONS UA: ABNORMAL
PDW BLD-RTO: 14.7 % (ref 11.8–14.4)
PH UA: 7 (ref 5–8)
PLATELET # BLD: 171 K/UL (ref 138–453)
PLATELET ESTIMATE: ABNORMAL
PMV BLD AUTO: 10.2 FL (ref 8.1–13.5)
POTASSIUM SERPL-SCNC: 4.1 MMOL/L (ref 3.7–5.3)
PROTEIN UA: ABNORMAL
RBC # BLD: 4.32 M/UL (ref 3.95–5.11)
RBC # BLD: ABNORMAL 10*6/UL
RBC UA: ABNORMAL /HPF (ref 0–2)
RENAL EPITHELIAL, UA: ABNORMAL /HPF
SARS-COV-2, RAPID: DETECTED
SARS-COV-2: ABNORMAL
SARS-COV-2: ABNORMAL
SEG NEUTROPHILS: 64 % (ref 36–65)
SEGMENTED NEUTROPHILS ABSOLUTE COUNT: 2.88 K/UL (ref 1.5–8.1)
SODIUM BLD-SCNC: 134 MMOL/L (ref 135–144)
SOURCE: ABNORMAL
SPECIFIC GRAVITY UA: 1.02 (ref 1–1.03)
TRICHOMONAS: ABNORMAL
TURBIDITY: ABNORMAL
URINE HGB: ABNORMAL
UROBILINOGEN, URINE: NORMAL
WBC # BLD: 4.5 K/UL (ref 3.5–11.3)
WBC # BLD: ABNORMAL 10*3/UL
WBC UA: ABNORMAL /HPF (ref 0–5)
YEAST: ABNORMAL

## 2020-10-22 PROCEDURE — 80048 BASIC METABOLIC PNL TOTAL CA: CPT

## 2020-10-22 PROCEDURE — 71045 X-RAY EXAM CHEST 1 VIEW: CPT

## 2020-10-22 PROCEDURE — 6360000002 HC RX W HCPCS: Performed by: NURSE PRACTITIONER

## 2020-10-22 PROCEDURE — 81001 URINALYSIS AUTO W/SCOPE: CPT

## 2020-10-22 PROCEDURE — 93005 ELECTROCARDIOGRAM TRACING: CPT | Performed by: INTERNAL MEDICINE

## 2020-10-22 PROCEDURE — 1200000000 HC SEMI PRIVATE

## 2020-10-22 PROCEDURE — U0002 COVID-19 LAB TEST NON-CDC: HCPCS

## 2020-10-22 PROCEDURE — 87040 BLOOD CULTURE FOR BACTERIA: CPT

## 2020-10-22 PROCEDURE — 96365 THER/PROPH/DIAG IV INF INIT: CPT

## 2020-10-22 PROCEDURE — 99222 1ST HOSP IP/OBS MODERATE 55: CPT | Performed by: NURSE PRACTITIONER

## 2020-10-22 PROCEDURE — 83605 ASSAY OF LACTIC ACID: CPT

## 2020-10-22 PROCEDURE — 99283 EMERGENCY DEPT VISIT LOW MDM: CPT

## 2020-10-22 PROCEDURE — 85025 COMPLETE CBC W/AUTO DIFF WBC: CPT

## 2020-10-22 PROCEDURE — 87086 URINE CULTURE/COLONY COUNT: CPT

## 2020-10-22 PROCEDURE — 2580000003 HC RX 258: Performed by: NURSE PRACTITIONER

## 2020-10-22 RX ORDER — 0.9 % SODIUM CHLORIDE 0.9 %
1000 INTRAVENOUS SOLUTION INTRAVENOUS ONCE
Status: DISCONTINUED | OUTPATIENT
Start: 2020-10-22 | End: 2020-10-27

## 2020-10-22 RX ORDER — 0.9 % SODIUM CHLORIDE 0.9 %
1000 INTRAVENOUS SOLUTION INTRAVENOUS ONCE
Status: COMPLETED | OUTPATIENT
Start: 2020-10-22 | End: 2020-10-22

## 2020-10-22 RX ADMIN — SODIUM CHLORIDE 1000 ML: 9 INJECTION, SOLUTION INTRAVENOUS at 20:14

## 2020-10-22 RX ADMIN — ENOXAPARIN SODIUM 60 MG: 60 INJECTION SUBCUTANEOUS at 23:34

## 2020-10-22 RX ADMIN — CEFTRIAXONE SODIUM 1 G: 1 INJECTION, POWDER, FOR SOLUTION INTRAMUSCULAR; INTRAVENOUS at 20:14

## 2020-10-22 ASSESSMENT — PAIN SCALES - GENERAL: PAINLEVEL_OUTOF10: 0

## 2020-10-22 NOTE — ED NOTES
Pt arrived via EMS from her assisted living. Per EMS there is a worker that tested positive for COVID, pt was not exposed, pt had fever of 100.3.      Nikolas Dorantes RN  10/22/20 5789

## 2020-10-23 ENCOUNTER — APPOINTMENT (OUTPATIENT)
Dept: GENERAL RADIOLOGY | Age: 85
DRG: 177 | End: 2020-10-23
Payer: MEDICARE

## 2020-10-23 PROBLEM — I48.91 ATRIAL FIBRILLATION (HCC): Status: ACTIVE | Noted: 2020-10-23

## 2020-10-23 PROBLEM — T18.9XXA SWALLOWED FOREIGN BODY: Status: ACTIVE | Noted: 2020-10-23

## 2020-10-23 PROBLEM — N39.0 URINARY TRACT INFECTION WITHOUT HEMATURIA: Status: ACTIVE | Noted: 2020-10-23

## 2020-10-23 PROBLEM — I48.0 PAROXYSMAL ATRIAL FIBRILLATION (HCC): Status: ACTIVE | Noted: 2020-10-23

## 2020-10-23 LAB
ABO/RH: NORMAL
ABSOLUTE EOS #: <0.03 K/UL (ref 0–0.44)
ABSOLUTE IMMATURE GRANULOCYTE: 0.01 K/UL (ref 0–0.3)
ABSOLUTE LYMPH #: 1.72 K/UL (ref 1.1–3.7)
ABSOLUTE MONO #: 0.47 K/UL (ref 0.1–1.2)
ALBUMIN SERPL-MCNC: 3.5 G/DL (ref 3.5–5.2)
ALBUMIN SERPL-MCNC: 3.6 G/DL (ref 3.5–5.2)
ALBUMIN/GLOBULIN RATIO: ABNORMAL (ref 1–2.5)
ALBUMIN/GLOBULIN RATIO: ABNORMAL (ref 1–2.5)
ALP BLD-CCNC: 74 U/L (ref 35–104)
ALP BLD-CCNC: 77 U/L (ref 35–104)
ALT SERPL-CCNC: 11 U/L (ref 5–33)
ALT SERPL-CCNC: 8 U/L (ref 5–33)
ANION GAP SERPL CALCULATED.3IONS-SCNC: 7 MMOL/L (ref 9–17)
ANION GAP SERPL CALCULATED.3IONS-SCNC: 8 MMOL/L (ref 9–17)
ANTIBODY SCREEN: NEGATIVE
ARM BAND NUMBER: NORMAL
AST SERPL-CCNC: 14 U/L
AST SERPL-CCNC: 15 U/L
BASOPHILS # BLD: 0 % (ref 0–2)
BASOPHILS ABSOLUTE: <0.03 K/UL (ref 0–0.2)
BILIRUB SERPL-MCNC: 0.34 MG/DL (ref 0.3–1.2)
BILIRUB SERPL-MCNC: 0.36 MG/DL (ref 0.3–1.2)
BUN BLDV-MCNC: 10 MG/DL (ref 8–23)
BUN BLDV-MCNC: 8 MG/DL (ref 8–23)
BUN/CREAT BLD: 15 (ref 9–20)
BUN/CREAT BLD: 17 (ref 9–20)
CALCIUM SERPL-MCNC: 8.8 MG/DL (ref 8.6–10.4)
CALCIUM SERPL-MCNC: 8.9 MG/DL (ref 8.6–10.4)
CHLORIDE BLD-SCNC: 101 MMOL/L (ref 98–107)
CHLORIDE BLD-SCNC: 102 MMOL/L (ref 98–107)
CO2: 28 MMOL/L (ref 20–31)
CO2: 28 MMOL/L (ref 20–31)
CREAT SERPL-MCNC: 0.52 MG/DL (ref 0.5–0.9)
CREAT SERPL-MCNC: 0.58 MG/DL (ref 0.5–0.9)
CULTURE: NORMAL
D-DIMER QUANTITATIVE: 0.7 MG/L FEU (ref 0–0.59)
DIFFERENTIAL TYPE: ABNORMAL
EKG ATRIAL RATE: 101 BPM
EKG Q-T INTERVAL: 330 MS
EKG QRS DURATION: 94 MS
EKG QTC CALCULATION (BAZETT): 421 MS
EKG R AXIS: 75 DEGREES
EKG T AXIS: 95 DEGREES
EKG VENTRICULAR RATE: 98 BPM
EOSINOPHILS RELATIVE PERCENT: 0 % (ref 1–4)
EXPIRATION DATE: NORMAL
FERRITIN: 155 UG/L (ref 13–150)
FIBRINOGEN: 437 MG/DL (ref 179–518)
GFR AFRICAN AMERICAN: >60 ML/MIN
GFR AFRICAN AMERICAN: >60 ML/MIN
GFR NON-AFRICAN AMERICAN: >60 ML/MIN
GFR NON-AFRICAN AMERICAN: >60 ML/MIN
GFR SERPL CREATININE-BSD FRML MDRD: ABNORMAL ML/MIN/{1.73_M2}
GLUCOSE BLD-MCNC: 100 MG/DL (ref 70–99)
GLUCOSE BLD-MCNC: 91 MG/DL (ref 70–99)
HCT VFR BLD CALC: 40.6 % (ref 36.3–47.1)
HEMOGLOBIN: 12.9 G/DL (ref 11.9–15.1)
IMMATURE GRANULOCYTES: 0 %
INR BLD: 1.1
LACTATE DEHYDROGENASE: 208 U/L (ref 135–214)
LYMPHOCYTES # BLD: 43 % (ref 24–43)
Lab: NORMAL
MCH RBC QN AUTO: 29.8 PG (ref 25.2–33.5)
MCHC RBC AUTO-ENTMCNC: 31.8 G/DL (ref 28.4–34.8)
MCV RBC AUTO: 93.8 FL (ref 82.6–102.9)
MONOCYTES # BLD: 12 % (ref 3–12)
NRBC AUTOMATED: 0 PER 100 WBC
PARTIAL THROMBOPLASTIN TIME: 42.6 SEC (ref 23.9–33.8)
PDW BLD-RTO: 14.6 % (ref 11.8–14.4)
PLATELET # BLD: 168 K/UL (ref 138–453)
PLATELET ESTIMATE: ABNORMAL
PMV BLD AUTO: 10.3 FL (ref 8.1–13.5)
POTASSIUM SERPL-SCNC: 4 MMOL/L (ref 3.7–5.3)
POTASSIUM SERPL-SCNC: 4.1 MMOL/L (ref 3.7–5.3)
PROTHROMBIN TIME: 13.5 SEC (ref 11.5–14.2)
RBC # BLD: 4.33 M/UL (ref 3.95–5.11)
RBC # BLD: ABNORMAL 10*6/UL
SEG NEUTROPHILS: 45 % (ref 36–65)
SEGMENTED NEUTROPHILS ABSOLUTE COUNT: 1.84 K/UL (ref 1.5–8.1)
SODIUM BLD-SCNC: 137 MMOL/L (ref 135–144)
SODIUM BLD-SCNC: 137 MMOL/L (ref 135–144)
SPECIMEN DESCRIPTION: NORMAL
TOTAL PROTEIN: 6 G/DL (ref 6.4–8.3)
TOTAL PROTEIN: 6.2 G/DL (ref 6.4–8.3)
WBC # BLD: 4.1 K/UL (ref 3.5–11.3)
WBC # BLD: ABNORMAL 10*3/UL

## 2020-10-23 PROCEDURE — 6370000000 HC RX 637 (ALT 250 FOR IP): Performed by: NURSE PRACTITIONER

## 2020-10-23 PROCEDURE — 99222 1ST HOSP IP/OBS MODERATE 55: CPT | Performed by: INTERNAL MEDICINE

## 2020-10-23 PROCEDURE — 80053 COMPREHEN METABOLIC PANEL: CPT

## 2020-10-23 PROCEDURE — 85384 FIBRINOGEN ACTIVITY: CPT

## 2020-10-23 PROCEDURE — 6360000002 HC RX W HCPCS: Performed by: NURSE PRACTITIONER

## 2020-10-23 PROCEDURE — C9113 INJ PANTOPRAZOLE SODIUM, VIA: HCPCS | Performed by: NURSE PRACTITIONER

## 2020-10-23 PROCEDURE — 86900 BLOOD TYPING SEROLOGIC ABO: CPT

## 2020-10-23 PROCEDURE — 97163 PT EVAL HIGH COMPLEX 45 MIN: CPT

## 2020-10-23 PROCEDURE — 2580000003 HC RX 258: Performed by: NURSE PRACTITIONER

## 2020-10-23 PROCEDURE — 86850 RBC ANTIBODY SCREEN: CPT

## 2020-10-23 PROCEDURE — 85379 FIBRIN DEGRADATION QUANT: CPT

## 2020-10-23 PROCEDURE — 1200000000 HC SEMI PRIVATE

## 2020-10-23 PROCEDURE — 85610 PROTHROMBIN TIME: CPT

## 2020-10-23 PROCEDURE — 99232 SBSQ HOSP IP/OBS MODERATE 35: CPT | Performed by: INTERNAL MEDICINE

## 2020-10-23 PROCEDURE — 74018 RADEX ABDOMEN 1 VIEW: CPT

## 2020-10-23 PROCEDURE — 36415 COLL VENOUS BLD VENIPUNCTURE: CPT

## 2020-10-23 PROCEDURE — 85025 COMPLETE CBC W/AUTO DIFF WBC: CPT

## 2020-10-23 PROCEDURE — 85730 THROMBOPLASTIN TIME PARTIAL: CPT

## 2020-10-23 PROCEDURE — 6370000000 HC RX 637 (ALT 250 FOR IP): Performed by: INTERNAL MEDICINE

## 2020-10-23 PROCEDURE — 83615 LACTATE (LD) (LDH) ENZYME: CPT

## 2020-10-23 PROCEDURE — 86901 BLOOD TYPING SEROLOGIC RH(D): CPT

## 2020-10-23 PROCEDURE — APPNB30 APP NON BILLABLE TIME 0-30 MINS: Performed by: NURSE PRACTITIONER

## 2020-10-23 PROCEDURE — 82728 ASSAY OF FERRITIN: CPT

## 2020-10-23 PROCEDURE — 97530 THERAPEUTIC ACTIVITIES: CPT

## 2020-10-23 RX ORDER — QUETIAPINE FUMARATE 25 MG/1
25 TABLET, FILM COATED ORAL 4 TIMES DAILY
COMMUNITY

## 2020-10-23 RX ORDER — ASPIRIN 81 MG/1
81 TABLET ORAL DAILY
Status: DISCONTINUED | OUTPATIENT
Start: 2020-10-23 | End: 2020-10-23

## 2020-10-23 RX ORDER — SODIUM CHLORIDE 0.9 % (FLUSH) 0.9 %
10 SYRINGE (ML) INJECTION EVERY 12 HOURS SCHEDULED
Status: DISCONTINUED | OUTPATIENT
Start: 2020-10-23 | End: 2020-10-29 | Stop reason: HOSPADM

## 2020-10-23 RX ORDER — POLYETHYLENE GLYCOL 3350 17 G/17G
17 POWDER, FOR SOLUTION ORAL DAILY PRN
Status: DISCONTINUED | OUTPATIENT
Start: 2020-10-23 | End: 2020-10-29 | Stop reason: HOSPADM

## 2020-10-23 RX ORDER — SODIUM CHLORIDE 9 MG/ML
INJECTION, SOLUTION INTRAVENOUS CONTINUOUS
Status: DISCONTINUED | OUTPATIENT
Start: 2020-10-23 | End: 2020-10-25

## 2020-10-23 RX ORDER — HYDRALAZINE HYDROCHLORIDE 25 MG/1
25 TABLET, FILM COATED ORAL EVERY 8 HOURS SCHEDULED
Status: DISCONTINUED | OUTPATIENT
Start: 2020-10-23 | End: 2020-10-23

## 2020-10-23 RX ORDER — ACETAMINOPHEN 160 MG/5ML
640 SOLUTION ORAL EVERY 8 HOURS PRN
COMMUNITY

## 2020-10-23 RX ORDER — DONEPEZIL HYDROCHLORIDE 10 MG/1
10 TABLET, FILM COATED ORAL NIGHTLY
Status: DISCONTINUED | OUTPATIENT
Start: 2020-10-23 | End: 2020-10-23

## 2020-10-23 RX ORDER — SODIUM CHLORIDE 9 MG/ML
10 INJECTION INTRAVENOUS DAILY
Status: DISCONTINUED | OUTPATIENT
Start: 2020-10-23 | End: 2020-10-23

## 2020-10-23 RX ORDER — SODIUM CHLORIDE 0.9 % (FLUSH) 0.9 %
10 SYRINGE (ML) INJECTION PRN
Status: DISCONTINUED | OUTPATIENT
Start: 2020-10-23 | End: 2020-10-29 | Stop reason: HOSPADM

## 2020-10-23 RX ORDER — EZETIMIBE 10 MG/1
10 TABLET ORAL DAILY
Status: DISCONTINUED | OUTPATIENT
Start: 2020-10-23 | End: 2020-10-23

## 2020-10-23 RX ORDER — MEMANTINE HYDROCHLORIDE 10 MG/1
10 TABLET ORAL 2 TIMES DAILY
Status: DISCONTINUED | OUTPATIENT
Start: 2020-10-23 | End: 2020-10-23

## 2020-10-23 RX ORDER — LATANOPROST 50 UG/ML
1 SOLUTION/ DROPS OPHTHALMIC NIGHTLY
Status: DISCONTINUED | OUTPATIENT
Start: 2020-10-23 | End: 2020-10-23

## 2020-10-23 RX ORDER — QUETIAPINE FUMARATE 100 MG/1
100 TABLET, FILM COATED ORAL DAILY
COMMUNITY

## 2020-10-23 RX ORDER — HYDROCODONE BITARTRATE AND ACETAMINOPHEN 5; 325 MG/1; MG/1
1 TABLET ORAL EVERY 6 HOURS PRN
COMMUNITY

## 2020-10-23 RX ORDER — HYDRALAZINE HYDROCHLORIDE 20 MG/ML
5 INJECTION INTRAMUSCULAR; INTRAVENOUS ONCE
Status: COMPLETED | OUTPATIENT
Start: 2020-10-23 | End: 2020-10-23

## 2020-10-23 RX ORDER — LANOLIN ALCOHOL/MO/W.PET/CERES
9 CREAM (GRAM) TOPICAL NIGHTLY
Status: DISCONTINUED | OUTPATIENT
Start: 2020-10-23 | End: 2020-10-29 | Stop reason: HOSPADM

## 2020-10-23 RX ORDER — MELATONIN 10 MG
10 CAPSULE ORAL NIGHTLY
COMMUNITY

## 2020-10-23 RX ORDER — M-VIT,TX,IRON,MINS/CALC/FOLIC 27MG-0.4MG
1 TABLET ORAL DAILY
Status: DISCONTINUED | OUTPATIENT
Start: 2020-10-23 | End: 2020-10-23

## 2020-10-23 RX ORDER — ACETAMINOPHEN 650 MG/1
650 SUPPOSITORY RECTAL EVERY 6 HOURS PRN
Status: DISCONTINUED | OUTPATIENT
Start: 2020-10-23 | End: 2020-10-29 | Stop reason: HOSPADM

## 2020-10-23 RX ORDER — ACETAMINOPHEN 325 MG/1
650 TABLET ORAL EVERY 6 HOURS PRN
Status: DISCONTINUED | OUTPATIENT
Start: 2020-10-23 | End: 2020-10-29 | Stop reason: HOSPADM

## 2020-10-23 RX ORDER — PROMETHAZINE HYDROCHLORIDE 12.5 MG/1
12.5 TABLET ORAL EVERY 6 HOURS PRN
Status: DISCONTINUED | OUTPATIENT
Start: 2020-10-23 | End: 2020-10-29 | Stop reason: HOSPADM

## 2020-10-23 RX ORDER — ONDANSETRON 2 MG/ML
4 INJECTION INTRAMUSCULAR; INTRAVENOUS EVERY 6 HOURS PRN
Status: DISCONTINUED | OUTPATIENT
Start: 2020-10-23 | End: 2020-10-29 | Stop reason: HOSPADM

## 2020-10-23 RX ORDER — SELENIUM 50 MCG
2 TABLET ORAL DAILY
Status: DISCONTINUED | OUTPATIENT
Start: 2020-10-23 | End: 2020-10-23 | Stop reason: RX

## 2020-10-23 RX ORDER — QUETIAPINE FUMARATE 25 MG/1
25 TABLET, FILM COATED ORAL 4 TIMES DAILY
Status: DISCONTINUED | OUTPATIENT
Start: 2020-10-23 | End: 2020-10-27

## 2020-10-23 RX ORDER — PANTOPRAZOLE SODIUM 40 MG/1
40 TABLET, DELAYED RELEASE ORAL
Status: DISCONTINUED | OUTPATIENT
Start: 2020-10-23 | End: 2020-10-23

## 2020-10-23 RX ORDER — QUETIAPINE FUMARATE 100 MG/1
100 TABLET, FILM COATED ORAL DAILY
Status: DISCONTINUED | OUTPATIENT
Start: 2020-10-24 | End: 2020-10-27

## 2020-10-23 RX ORDER — PANTOPRAZOLE SODIUM 40 MG/10ML
40 INJECTION, POWDER, LYOPHILIZED, FOR SOLUTION INTRAVENOUS DAILY
Status: DISCONTINUED | OUTPATIENT
Start: 2020-10-23 | End: 2020-10-23

## 2020-10-23 RX ADMIN — LATANOPROST 1 DROP: 50 SOLUTION OPHTHALMIC at 01:22

## 2020-10-23 RX ADMIN — HYDRALAZINE HYDROCHLORIDE 25 MG: 25 TABLET, FILM COATED ORAL at 01:22

## 2020-10-23 RX ADMIN — SODIUM CHLORIDE: 9 INJECTION, SOLUTION INTRAVENOUS at 00:17

## 2020-10-23 RX ADMIN — ASPIRIN 81 MG: 81 TABLET, COATED ORAL at 08:32

## 2020-10-23 RX ADMIN — EZETIMIBE 10 MG: 10 TABLET ORAL at 08:32

## 2020-10-23 RX ADMIN — ENOXAPARIN SODIUM 60 MG: 60 INJECTION SUBCUTANEOUS at 21:40

## 2020-10-23 RX ADMIN — MEMANTINE 10 MG: 10 TABLET ORAL at 08:32

## 2020-10-23 RX ADMIN — PANTOPRAZOLE SODIUM 40 MG: 40 INJECTION, POWDER, FOR SOLUTION INTRAVENOUS at 08:32

## 2020-10-23 RX ADMIN — ENOXAPARIN SODIUM 60 MG: 60 INJECTION SUBCUTANEOUS at 08:32

## 2020-10-23 RX ADMIN — DONEPEZIL HYDROCHLORIDE 10 MG: 10 TABLET, FILM COATED ORAL at 01:22

## 2020-10-23 RX ADMIN — HYDRALAZINE HYDROCHLORIDE 5 MG: 20 INJECTION, SOLUTION INTRAMUSCULAR; INTRAVENOUS at 06:10

## 2020-10-23 RX ADMIN — MULTIPLE VITAMINS W/ MINERALS TAB 1 TABLET: TAB at 08:32

## 2020-10-23 RX ADMIN — AZITHROMYCIN MONOHYDRATE 500 MG: 500 INJECTION, POWDER, LYOPHILIZED, FOR SOLUTION INTRAVENOUS at 01:21

## 2020-10-23 RX ADMIN — CEFTRIAXONE SODIUM 1 G: 1 INJECTION, POWDER, FOR SOLUTION INTRAMUSCULAR; INTRAVENOUS at 21:42

## 2020-10-23 RX ADMIN — Medication 10 ML: at 08:33

## 2020-10-23 NOTE — PROGRESS NOTES
Transitions of Care Pharmacy Service   Medication Review    The patient's list of current home medications has been reviewed. Source(s) of information: KAMLESH Locke med list, surescripts refill report      PROVIDER ACTION REQUESTED  Medications that need to be addressed by a physician/nurse practitioner:    Medication Action Requested   Aspirin 81mg,  Donepezil 10mg,  Ezetimibe 10mg,  Hydralazine 25mg,  Latanoprost eye drop,  Memantine 10mg,  Multivitamin,   Protonix 40mg     Not current meds per KAMLESH Locke. They were all prior outpatient prescriptions (but nothing refilled after March 2020) -- please discontinue if appropriate, otherwise new orders will need to be issued at discharge to [facility] added to list (Seroquel, melatonin, Norco) need review/reorder as appropriate           Please feel free to call me with any questions about this encounter. Thank you. Too Cole [address] [place] [address]   Transitions of Care Pharmacy Service  Phone:  [phone]  Fax: [phone]      Electronically signed by Too Cole [address] [place] [address] on 10/23/2020 at 5:47 PM           Medications Prior to Admission:   melatonin 10 MG CAPS capsule, Take 10 mg by mouth nightly  QUEtiapine (SEROQUEL) 100 MG tablet, Take 100 mg by mouth daily  QUEtiapine (SEROQUEL) 25 MG tablet, Take 25 mg by mouth 4 times daily At 8am, 12pm, 40pm, and 8pm while awake  acetaminophen (TYLENOL) 160 MG/5ML solution, Take 640 mg by mouth every 8 hours as needed for Pain  HYDROcodone-acetaminophen (NORCO) 5-325 MG per tablet, Take 1 tablet by mouth every 6 hours as needed for Pain.   cephALEXin (KEFLEX) 250 MG capsule, Take 250 mg by mouth daily

## 2020-10-23 NOTE — ED NOTES
Pt is in atrial fibrillation hr 93 to 117 bp 167/67 pt has no known history of atrial fibrillation Welford Proper cnp notified     Jose Garcia RN  10/22/20 0512

## 2020-10-23 NOTE — ED PROVIDER NOTES
[facility] ED  eMERGENCY dEPARTMENT eNCOUnter      Pt Name: Prabha Dumont  MRN: [id number]  [place] 1/12/1929  Date of evaluation: 10/22/2020  Provider: Alicia Okeefe NP, MAYA Torre [id number]       Chief Complaint   Patient presents with    Fever         HISTORY OF PRESENT ILLNESS  (Location/Symptom, Timing/Onset, Context/Setting, Quality, Duration, Modifying Factors, Severity.)   Prabha Dumont is a 80 y.o. female who presents to the emergency department by private EMS for evaluation of a fever. Patient resides at [facility] where a coworker had tested positive for Covid. She started running fevers today so they sent her to the emergency room for evaluation. She has a history of dementia and is normally confused at baseline. They sent her here for covid testing. Nursing Notes were reviewed. ALLERGIES     Latex; Actonel [risedronate sodium]; Adhesive tape; Aminophylline; Amlodipine; Celebrex [celecoxib]; Chlorthalidone; Ciprofloxacin; Codeine; Colesevelam hcl; Darvocet a500 [propoxyphene n-acetaminophen]; Demerol hcl [meperidine]; Lisinopril; Lovastatin; Macrobid [nitrofurantoin monohyd macro]; Metoprolol; Nitrofuran derivatives; Nitrofurantoin; Propoxyphene; Ranitidine; Ranitidine hcl; Simvastatin; Tramadol hcl; Tramadol hcl; Trimethadione; Trimethoprim; Ultram [tramadol]; Vioxx [rofecoxib];  Naproxen; and Sulfa antibiotics    CURRENT MEDICATIONS       Previous Medications    ASPIRIN 81 MG TABLET    Take 1 tablet by mouth daily    CEPHALEXIN (KEFLEX) 250 MG CAPSULE        CHLORTHALIDONE (HYGROTON) 25 MG TABLET    Take 12.5 mg by mouth Twice a Week Indications: on thursday and sunday mornings    DONEPEZIL (ARICEPT) 10 MG TABLET    Take 1 tablet by mouth nightly    EZETIMIBE (ZETIA) 10 MG TABLET    Take 1 tablet by mouth daily    HYDRALAZINE (APRESOLINE) 25 MG TABLET    TAKE 1 TABLET BY MOUTH 3 TIMES DAILY AS NEEDED (SBP >140)    LACTOBACILLUS (ACIDOPHILUS) CAPS CAPSULE Take 2 capsules by mouth daily    LATANOPROST (XALATAN) 0.005 % OPHTHALMIC SOLUTION    Place 1 drop into both eyes nightly     MEMANTINE (NAMENDA) 10 MG TABLET    TAKE 1 TABLET BY MOUTH TWICE DAILY    MULTIPLE VITAMINS-MINERALS (CENTRUM SILVER) TABS    Take 1 tablet by mouth daily    NITROGLYCERIN (NITROSTAT) 0.4 MG SL TABLET    Place 1 tablet under the tongue as needed for Chest pain    OMEPRAZOLE (PRILOSEC) 20 MG CAPSULE    Take 1 capsule by mouth 2 times daily       PAST MEDICAL HISTORY         Diagnosis Date    Abnormal cardiovascular stress test 2/28/2014    Acute confusion     Acute coronary syndrome ([address] 4/25/2014    Acute right-sided CHF (congestive heart failure) ([address] 2/28/2014    Acute on chronic assoc w/ chronic MV disease     Angina pectoris ([address] 4/22/2016    worse     Arthritis     CAD (coronary artery disease)     Calculus of kidney 5/17/2019    CHF (congestive heart failure) (HCC)     Chronic cystitis 5/17/2019    Chronic renal failure, stage 3 (moderate) 4/19/2019    Dementia ([address]     Dyslipidemia     Essential hypertension 12/4/2015    GERD (gastroesophageal reflux disease)     GERD (gastroesophageal reflux disease)     Hx of mitral valve repair 2/28/2014    And annuloplasty ring     Hypertension     Hypokalemia     Neurogenic bladder 5/17/2019    Osteoporosis     Pulmonary hypertension due to mitral valve disease ([address] 2/28/2014    Renal cyst, left 5/17/2019    Renal insufficiency     Right HF (heart failure) secondary to left HF (heart failure) ([address] 2/28/2014    S/P CABG x 2 2/28/2014    LIMA-LAD (occluded), SVG-RCA (patent 2/14 cath)     Stress incontinence of urine 5/17/2019    Unstable angina ([address] 5/29/2015    Urge incontinence of urine 5/17/2019    Urinary incontinence     UTI (urinary tract infection) 4/19/2019    Vascular dementia without behavioral disturbance ([address] 12/4/2015       SURGICAL HISTORY           Procedure Laterality Date    APPENDECTOMY  CARDIAC CATHETERIZATION  2016    CORONARY ARTERY BYPASS GRAFT N/A 2003    x2 with mitro valve repair    DILATION AND CURETTAGE OF UTERUS      EYE SURGERY Right     tear duct    HERNIA REPAIR      HYSTERECTOMY           FAMILY HISTORY           Problem Relation Age of Onset    Diabetes Mother     Cancer Father     Anemia Father      Family Status   Relation Name Status    Mother      Father          SOCIAL HISTORY      reports that she has never smoked. She has never used smokeless tobacco. She reports that she does not drink alcohol or use drugs. REVIEW OF SYSTEMS    (2-9 systems for level 4, 10 or more for level 5)     Review of Systems   Unable to perform ROS: Dementia        Except as noted above the remainder of the review of systems was reviewed and negative. PHYSICAL EXAM    (up to 7 for level 4, 8 or more for level 5)     ED Triage Vitals [10/22/20 1806]   BP Temp Temp Source Pulse Resp SpO2 Height Weight   (!) 152/92 98.8 °F (37.1 °C) Oral 101 18 99 % -- --       Physical Exam  Vitals signs reviewed. Constitutional:       Appearance: She is well-developed. HENT:      Head: Normocephalic and atraumatic. Eyes:      Conjunctiva/sclera: Conjunctivae normal.      Pupils: Pupils are equal, round, and reactive to light. Neck:      Musculoskeletal: Normal range of motion and neck supple. Cardiovascular:      Rate and Rhythm: Normal rate. Rhythm irregular. Pulmonary:      Effort: Pulmonary effort is normal. No respiratory distress. Breath sounds: Normal breath sounds. No stridor. Abdominal:      General: Bowel sounds are normal.      Palpations: Abdomen is soft. Musculoskeletal: Normal range of motion. Lymphadenopathy:      Cervical: No cervical adenopathy. Skin:     General: Skin is warm and dry. Findings: No rash. Neurological:      Mental Status: She is alert and oriented to person, place, and time.              DIAGNOSTIC RESULTS     EKG: All EKG's are interpreted by the Emergency Department Physician who either signs or Co-signs this chart in the absence of a cardiologist.    Atrial fib     RADIOLOGY:   Non-plain film images such as CT, Ultrasound and MRI are read by the radiologist. Mary Bees radiographic images are visualized and preliminarily interpreted by the emergency physician with the below findings:    Xr Chest Portable    Result Date: 10/22/2020  EXAMINATION: ONE XRAY VIEW OF THE CHEST 10/22/2020 7:10 pm COMPARISON: 11/07/2016 HISTORY: ORDERING SYSTEM PROVIDED HISTORY: Chest Pain TECHNOLOGIST PROVIDED HISTORY: Chest Pain Reason for Exam: Fever, fatigue, weakness Acuity: Acute Type of Exam: Initial FINDINGS: Sternotomy wires. The cardiomediastinal silhouette is mild enlarged in size and contour. Mild perihilar congestion the lungs are clear. No pleural effusion or pneumothorax is present. Ring noted overlying the cardiac shadow, question this is extrinsic to the patient. No acute cardiopulmonary process     Xr Chest 1 View    Result Date: 10/22/2020  EXAMINATION: ONE XRAY VIEW OF THE CHEST 10/22/2020 7:52 pm COMPARISON: Comparison with frontal view of the chest completed earlier the same day. HISTORY: ORDERING SYSTEM PROVIDED HISTORY: Chest pain TECHNOLOGIST PROVIDED HISTORY: Chest pain lateral view please Reason for Exam: possible fb Acuity: Acute Type of Exam: Initial FINDINGS: Single lateral view again demonstrates the previously noted ring overlying the epigastric region and appears to be intrinsic, possibly in the stomach. The lungs are without acute focal process. There is no effusion or pneumothorax. The cardiomediastinal silhouette is without acute process. The osseous structures are without acute process. Single lateral view again demonstrates the previously noted ring overlying the epigastric region and appears to be intrinsic, possibly in the stomach.      Interpretation per the Radiologist below, if available at the time of this note:    XR CHEST 1 VIEW   Final Result   Single lateral view again demonstrates the previously noted ring overlying   the epigastric region and appears to be intrinsic, possibly in the stomach. XR CHEST PORTABLE   Final Result   No acute cardiopulmonary process                 LABS:  Labs Reviewed   CBC WITH AUTO DIFFERENTIAL - Abnormal; Notable for the following components:       Result Value    RDW 14.7 (*)     Eosinophils % 0 (*)     All other components within normal limits   BASIC METABOLIC PANEL - Abnormal; Notable for the following components:    Glucose 105 (*)     Bun/Cre Ratio 21 (*)     Sodium 134 (*)     All other components within normal limits   URINE RT REFLEX TO CULTURE - Abnormal; Notable for the following components:    Turbidity UA CLOUDY (*)     Urine Hgb TRACE (*)     Protein, UA TRACE (*)     Leukocyte Esterase, Urine LARGE (*)     All other components within normal limits   MICROSCOPIC URINALYSIS - Abnormal; Notable for the following components:    Bacteria, UA MODERATE (*)     All other components within normal limits   COVID-19 - Abnormal; Notable for the following components:    SARS-CoV-2, Rapid DETECTED (*)     All other components within normal limits   CULTURE, URINE   CULTURE, BLOOD 1   CULTURE, BLOOD 1   LACTATE, SEPSIS       All other labs were within normal range or not returned as of this dictation. EMERGENCY DEPARTMENT COURSE and DIFFERENTIAL DIAGNOSIS/MDM:   Vitals:    Vitals:    10/22/20 2012 10/22/20 2042 10/22/20 2112 10/22/20 2142   BP: 131/62 (!) 122/53 (!) 152/67 (!) 167/61   Pulse: 91 90 103 92   Resp:  15 19 25   Temp:       TempSrc:       SpO2: 97%   96%       Medical Decision Making: She was found to have a urinary tract infection and was placed on IV Rocephin after blood cultures x2 were drawn. The patient is Covid positive however the patient does not need to be admitted for Covid symptoms such as hypoxia as she is not requiring any oxygen.   We are admitting her to the hospital for her urinary tract infection. This was discussed with internal medicine. Also of note to the patient's original portable 1 view chest showed a ring that appeared to be external but on examination of the patient there was no rating anywhere to be found so a lateral view of the chest was done and it does show that she ingested her wedding ring appears to be in the stomach. Internal medicine was consulted for admission to the hospital and is aware of the positive Covid, urinary tract infection and the ingestion of the foreign body. Medications   0.9 % sodium chloride bolus (has no administration in time range)   enoxaparin (LOVENOX) injection 1 mg/kg (has no administration in time range)   0.9 % sodium chloride bolus (0 mLs Intravenous Stopped 10/22/20 2142)   cefTRIAXone (ROCEPHIN) 1 g IVPB in 50 mL D5W minibag (0 g Intravenous Stopped 10/22/20 2044)       CONSULTS:  IP CONSULT TO INTERNAL MEDICINE  IP CONSULT TO CARDIOLOGY        FINAL IMPRESSION      1. Urinary tract infection without hematuria, site unspecified    2. COVID-19    3. Swallowed foreign body, initial encounter          DISPOSITION/PLAN   DISPOSITION Admitted 10/22/2020 09:41:23 PM      PATIENT REFERRED TO:   No follow-up provider specified.     DISCHARGE MEDICATIONS:     New Prescriptions    No medications on file           (Please note that portions of this note were completed with a voice recognition program.  Efforts were made to edit the dictations but occasionally words are mis-transcribed.)    [id number] [facility] NP, APRN - CNP  Certified Nurse Practitioner            MAYA Olivera CNP  10/22/20 1687

## 2020-10-23 NOTE — CONSULTS
[facility] Cardiology   Consult Note             Date:   10/23/2020  Patient name: Noy Tavares  Date of admission:  10/22/2020  6:03 PM  MRN:   [id number]  YOB: 1929    Reason for Admission:  covid pneumonia      HISTORY OF PRESENT ILLNESS:    Patient presented from the nursing home with fever and exposure to somebody with COVID-19. Subsequently been diagnosed with COVID-19. Cardiology was consulted for newly discovered atrial fibrillation. Unable to do a complete history and physical because patient has dementia and is Covid positive. Past Medical History:   has a past medical history of Abnormal cardiovascular stress test, Acute confusion, Acute coronary syndrome ([address] Acute right-sided CHF (congestive heart failure) ([address] Angina pectoris (HCC), Arthritis, Atrial fibrillation ([address] CAD (coronary artery disease), Calculus of kidney, CHF (congestive heart failure) ([address] Chronic cystitis, Chronic renal failure, stage 3 (moderate), Dementia ([address] Dyslipidemia, Essential hypertension, GERD (gastroesophageal reflux disease), GERD (gastroesophageal reflux disease), Hx of mitral valve repair, Hypertension, Hypokalemia, Neurogenic bladder, Osteoporosis, Pulmonary hypertension due to mitral valve disease ([address] Renal cyst, left, Renal insufficiency, Right HF (heart failure) secondary to left HF (heart failure) ([address] S/P CABG x 2, Stress incontinence of urine, Unstable angina ([address] Urge incontinence of urine, Urinary incontinence, UTI (urinary tract infection), and Vascular dementia without behavioral disturbance ([address] Past Surgical History:   has a past surgical history that includes Hysterectomy; Dilation and curettage of uterus; Appendectomy; hernia repair; Eye surgery (Right); Coronary artery bypass graft (N/A, 2003); and Cardiac catheterization (4-). Home Medications:    Prior to Admission medications    Medication Sig Start Date End Date Taking?  Authorizing Provider hydrALAZINE (APRESOLINE) 25 MG tablet TAKE 1 TABLET BY MOUTH 3 TIMES DAILY AS NEEDED (SBP >140) 12/27/19   Osmin Collins MD   cephALEXin (KEFLEX) 250 MG capsule  9/12/19   Historical Provider, MD   memantine (NAMENDA) 10 MG tablet TAKE 1 TABLET BY MOUTH TWICE DAILY 9/12/19   Osmin Collins MD   chlorthalidone (HYGROTON) 25 MG tablet Take 12.5 mg by mouth Twice a Week Indications: on thursday and sunday mornings    Historical Provider, MD   donepezil (ARICEPT) 10 MG tablet Take 1 tablet by mouth nightly 3/5/19   Osmin Collins MD   Lactobacillus (ACIDOPHILUS) CAPS capsule Take 2 capsules by mouth daily 1/23/17   Osmin Collins MD   nitroGLYCERIN (NITROSTAT) 0.4 MG SL tablet Place 1 tablet under the tongue as needed for Chest pain 5/30/15   Suzanne Maharaj MD   ezetimibe (ZETIA) 10 MG tablet Take 1 tablet by mouth daily 5/30/15   Suzanne Maharaj MD   Multiple Vitamins-Minerals (CENTRUM SILVER) TABS Take 1 tablet by mouth daily 5/30/15   Suzanne Maharaj MD   omeprazole (PRILOSEC) 20 MG capsule Take 1 capsule by mouth 2 times daily 5/30/15   Suzanne Maharaj MD   aspirin 81 MG tablet Take 1 tablet by mouth daily 5/30/15   Suzanne Maharaj MD   latanoprost (XALATAN) 0.005 % ophthalmic solution Place 1 drop into both eyes nightly  10/15/13   Historical Provider, MD       Allergies:  Latex; Actonel [risedronate sodium]; Adhesive tape; Aminophylline; Amlodipine; Celebrex [celecoxib]; Chlorthalidone; Ciprofloxacin; Codeine; Colesevelam hcl; Darvocet a500 [propoxyphene n-acetaminophen]; Demerol hcl [meperidine]; Lisinopril; Lovastatin; Macrobid [nitrofurantoin monohyd macro]; Metoprolol; Nitrofuran derivatives; Nitrofurantoin; Propoxyphene; Ranitidine; Ranitidine hcl; Simvastatin; Tramadol hcl; Tramadol hcl; Trimethadione; Trimethoprim; Ultram [tramadol]; Vioxx [rofecoxib]; Naproxen; and Sulfa antibiotics    Social History:   reports that she has never smoked.  She has never used smokeless tobacco. She reports that she does not drink alcohol or use drugs. Family History: family history includes Anemia in her father; Cancer in her father; Diabetes in her mother. REVIEW OF SYSTEMS:    · Unable to do    PHYSICAL EXAM:    Physical Examination:    /85   Pulse 94   Temp 98.2 °F (36.8 °C) (Axillary)   Resp 18   Wt 86 lb 11.2 oz (39.3 kg)   SpO2 90%   BMI 14.88 kg/m²    Unable to do  DATA:    Diagnostics:      EKG: atrial fibrillation. Labs:     CBC:   Recent Labs     10/22/20  1845 10/23/20  0505   WBC 4.5 4.1   HGB 13.0 12.9   HCT 40.4 40.6    168     BMP:   Recent Labs     10/23/20  0026 10/23/20  0505    137   K 4.1 4.0   CO2 28 28   BUN 10 8   CREATININE 0.58 0.52   LABGLOM >60 >60   GLUCOSE 100* 91     BNP: No results for input(s): BNP in the last 72 hours. PT/INR:   Recent Labs     10/23/20  0505   PROTIME 13.5   INR 1.1     APTT:  Recent Labs     10/23/20  0505   APTT 42.6*     CARDIAC ENZYMES:No results for input(s): CKTOTAL, CKMB, CKMBINDEX, TROPONINI in the last 72 hours.   FASTING LIPID PANEL:  Lab Results   Component Value Date    HDL 43 06/17/2019    TRIG 140 06/17/2019     LIVER PROFILE:  Recent Labs     10/23/20  0026 10/23/20  0505   AST 15 14   ALT 11 8   LABALBU 3.6 3.5         IMPRESSION:    Patient Active Problem List   Diagnosis    Renal insufficiency    Urinary incontinence    Osteoporosis    Abnormal cardiovascular stress test    Pulmonary hypertension due to mitral valve disease ([address]    Hx of mitral valve repair    S/P CABG x 2    Right HF (heart failure) secondary to left HF (heart failure) (HCC)    Acute right-sided CHF (congestive heart failure) (HCC)    Acute coronary syndrome (HCC)    Drug allergy, multiple    Unstable angina ([address]    Essential hypertension    Vascular dementia without behavioral disturbance ([address]    Dyslipidemia    Urinary bladder disorder    History of recurrent UTI (urinary tract infection)    [facility]ina pectoris ([facility])    SOB (shortness of breath) on exertion    Dizziness    GERD (gastroesophageal reflux disease)    Chronic renal failure, stage 3 (moderate)    Weakness    Hypokalemia    Acute confusion    Chronic cystitis    Hydronephrosis, right    Neurogenic bladder    Hypertension    Osteoporosis    Acquired cystic kidney disease    Calculus of kidney    Renal cyst, left    Stress incontinence of urine    Urge incontinence of urine    Recurrent UTI    Urinary retention with incomplete bladder emptying    COVID-19 virus infection    Urinary tract infection without hematuria    Swallowed foreign body    Paroxysmal atrial fibrillation (HCC)       RECOMMENDATIONS:  1. Atrial fibrillation  2. Cad  Patient is currently well rate controlled. Current medical therapy. Discussed with patient and nursing.     Aubree Dietrich MD  [facility] cardiology

## 2020-10-23 NOTE — H&P
[facility]  Office: [address], DO, Jovan Bustos, DO, Johnnie Alvarez, DO, Asha Hillman Blood, DO, Marva Caputo MD, Alice Tracey MD, Enrico Menchaca MD, Leighann Gary MD, Robert Garnica MD, Anel Pardo MD, Afshan Ortiz MD, Irina Conte MD, Ariel Zamudio MD, Jocelynn Navarro, DO, Abbie Ken MD, Nella Garcia MD, Ruthann Harrington DO, Beth Navarro MD,  Nathanial Apgar, DO, Erwin Negrete MD, Beatriz Pollack MD, Ailin Salazar, [facility], [facility], CNP, Frida Jones, CNP, Belén Johnson, CNS, Haley Fret, CNP, Chinmay Gallagher, CNP, Brenda Linda, CNP, Norberto Miles, CNP, Jacque Ayala, CNP, Leonie Lopez PA-C, Gareth Dennis, [facility], Jaquan Doe, CNP, Anuja Martino, CNP, Galo Manjarrez, CNP, Fadia Knox, CNP, Kathy Luna, [facility]    HISTORY AND PHYSICAL EXAMINATION            Date:   10/23/2020  Patient name:  Ceci Morocho  Date of admission:  10/22/2020  6:03 PM  MRN:   [id number]  Account:  [de-identified]  YOB: 1929  PCP:    Geoff Spencer DO  Room:   [address] [place] [address]  Code Status:    Full Code    Chief Complaint:     Chief Complaint   Patient presents with    Fever       History Obtained From:     patient, electronic medical record    History of Present Illness:     Ceci Morocho is a 80 y.o. Non-/non  female who presents with Fever   and is admitted to the hospital for the management of Urinary tract infection without hematuria. Patient is unable to provide information due to mental status, so information was obtained from the Emergency Department note. Patient was brought to the ED via private EMS for further evaluation of fever. He of hypertension, dementia, dyslipidemia, GERD, recurrent UTIs, and previous CABG. she resides at  [facility], where an employee tested positive for Covid.  The facility was concerned for Covid due to her fever, so she was sent to the ED for evaluation. She is not hypoxic or requiring oxygen. Her Covid screening was positive. She was noted to be in atrial fibrillation, which she has no history of prior to today. Her urinalysis was positive for large leukocytes, moderate bacteria, and trace hemoglobin. Blood cultures were obtained by the emergency department. Chest x-ray does not note any acute cardiopulmonary process, but does note a ring overlying the epigastric region. She is being admitted for further evaluation and management of urinary tract infection.     Past Medical History:     Past Medical History:   Diagnosis Date    Abnormal cardiovascular stress test 2/28/2014    Acute confusion     Acute coronary syndrome (HCC) 4/25/2014    Acute right-sided CHF (congestive heart failure) ([address] 2/28/2014    Acute on chronic assoc w/ chronic MV disease     Angina pectoris ([address] 4/22/2016    worse     Arthritis     Atrial fibrillation ([address] 10/23/2020    CAD (coronary artery disease)     Calculus of kidney 5/17/2019    CH[facility]congest[facility] failure) [facility]     Chronic cystitis 5/17/2019    Chronic renal failure, stage 3 (moderate) 4/19/2019    Dementia ([address]     Dyslipidemia     Essential hypertension 12/4/2015    GERD (gastroesophageal reflux disease)     GERD (gastroesophageal reflux disease)     Hx of mitral valve repair 2/28/2014    And annuloplasty ring     Hypertension     Hypokalemia     Neurogenic bladder 5/17/2019    Osteoporosis     Pulmonary hypertension due to mitral valve disease ([address] 2/28/2014    Renal cyst, left 5/17/2019    Renal insufficiency     Right HF (heart failure) secondary to left HF (heart failure) ([address] 2/28/2014    S/P CABG x 2 2/28/2014    LIMA-LAD (occluded), SVG-RCA (patent 2/14 cath)     Stress incontinence of urine 5/17/2019    Unstable angina ([address] 5/29/2015    Urge incontinence of urine 5/17/2019    Urinary incontinence     UTI (urinary tract infection) 4/19/2019    Vascular dementia without behavioral disturbance ([facility] 12/4/2015        Past Surgical History:     Past Surgical History:   Procedure Laterality Date    APPENDECTOMY      CARDIAC CATHETERIZATION  4-    CORONARY ARTERY BYPASS GRAFT N/A 2003    x2 with mitro valve repair    DILATION AND CURETTAGE OF UTERUS      EYE SURGERY Right     tear duct    HERNIA REPAIR      HYSTERECTOMY          Medications Prior to Admission:     Prior to Admission medications    Medication Sig Start Date End Date Taking? Authorizing Provider   hydrALAZINE (APRESOLINE) 25 MG tablet TAKE 1 TABLET BY MOUTH 3 TIMES DAILY AS NEEDED (SBP >140) 12/27/19   Brynn Avilez MD   cephALEXin (KEFLEX) 250 MG capsule  9/12/19   Historical Provider, MD   memantine (NAMENDA) 10 MG tablet TAKE 1 TABLET BY MOUTH TWICE DAILY 9/12/19   Brynn Avilez MD   chlorthalidone (HYGROTON) 25 MG tablet Take 12.5 mg by mouth Twice a Week Indications: on thursday and sunday mornings    Historical Provider, MD   donepezil (ARICEPT) 10 MG tablet Take 1 tablet by mouth nightly 3/5/19   Brynn Avilez MD   Lactobacillus (ACIDOPHILUS) CAPS capsule Take 2 capsules by mouth daily 1/23/17   Brynn Avilez MD   nitroGLYCERIN (NITROSTAT) 0.4 MG SL tablet Place 1 tablet under the tongue as needed for Chest pain 5/30/15   Manjeet Elizondo MD   ezetimibe (ZETIA) 10 MG tablet Take 1 tablet by mouth daily 5/30/15   Manjeet Elizondo MD   Multiple Vitamins-Minerals (CENTRUM SILVER) TABS Take 1 tablet by mouth daily 5/30/15   Manjeet Elizondo MD   omeprazole (PRILOSEC) 20 MG capsule Take 1 capsule by mouth 2 times daily 5/30/15   Manjeet Elizondo MD   aspirin 81 MG tablet Take 1 tablet by mouth daily 5/30/15   Manjeet Elizondo MD   latanoprost (XALATAN) 0.005 % ophthalmic solution Place 1 drop into both eyes nightly  10/15/13   Historical Provider, MD        Allergies:     Latex; Actonel [risedronate sodium]; Adhesive tape; Aminophylline;  Amlodipine; Celebrex [celecoxib]; Chlorthalidone; Ciprofloxacin; Codeine; Colesevelam hcl; Darvocet a500 [propoxyphene n-acetaminophen]; Demerol hcl [meperidine]; Lisinopril; Lovastatin; Macrobid [nitrofurantoin monohyd macro]; Metoprolol; Nitrofuran derivatives; Nitrofurantoin; Propoxyphene; Ranitidine; Ranitidine hcl; Simvastatin; Tramadol hcl; Tramadol hcl; Trimethadione; Trimethoprim; Ultram [tramadol]; Vioxx [rofecoxib]; Naproxen; and Sulfa antibiotics    Social History:     Tobacco:    reports that she has never smoked. She has never used smokeless tobacco.  Alcohol:      reports no history of alcohol use. Drug Use:  reports no history of drug use. Family History:     Family History   Problem Relation Age of Onset    Diabetes Mother     Cancer Father     Anemia Father        Review of Systems:     Positive and Negative as described in HPI. Review of Systems   Unable to perform ROS: Dementia       Physical Exam:   BP (!) 158/65   Pulse 91   Temp 97.9 °F (36.6 °C) (Axillary)   Resp 22   Wt 86 lb 11.2 oz (39.3 kg)   SpO2 100%   BMI 14.88 kg/m²   Temp (24hrs), Av.4 °F (36.9 °C), Min:97.9 °F (36.6 °C), Max:98.8 °F (37.1 °C)    No results for input(s): POCGLU in the last 72 hours. No intake or output data in the 24 hours ending 10/23/20 0135    Physical Exam  Vitals signs and nursing note reviewed. Constitutional:       General: She is not in acute distress. Appearance: Normal appearance. She is cachectic. She is ill-appearing. She is not toxic-appearing or diaphoretic. HENT:      Head: Normocephalic and atraumatic. Right Ear: External ear normal.      Left Ear: External ear normal.      Nose: Nose normal. No congestion or rhinorrhea. Mouth/Throat:      Mouth: Mucous membranes are moist.   Eyes:      General: No scleral icterus. Right eye: No discharge. Left eye: No discharge.       Conjunctiva/sclera: Conjunctivae normal.      Pupils: Pupils are equal, round, and reactive to light.   Neck:      Musculoskeletal: Normal range of motion and neck supple. No neck rigidity. Vascular: No carotid bruit. Cardiovascular:      Rate and Rhythm: Normal rate. Rhythm irregular. Pulses: Normal pulses. Heart sounds: Normal heart sounds. No murmur. No friction rub. No gallop. Pulmonary:      Effort: Pulmonary effort is normal. No respiratory distress. Breath sounds: Normal breath sounds. No stridor. No wheezing, rhonchi or rales. Abdominal:      General: Abdomen is flat. Bowel sounds are normal. There is no distension. Palpations: Abdomen is soft. There is no mass. Hernia: No hernia is present. Musculoskeletal:         General: No swelling, deformity or signs of injury. Right lower leg: No edema. Left lower leg: No edema. Lymphadenopathy:      Cervical: No cervical adenopathy. Skin:     General: Skin is warm and dry. Capillary Refill: Capillary refill takes less than 2 seconds. Coloration: Skin is not jaundiced or pale. Findings: No bruising, erythema, lesion or rash. Neurological:      Mental Status: She is disoriented.       Comments: Unable to assess due to mental status   Psychiatric:      Comments: Unable to assess due to mental status         Investigations:      Laboratory Testing:  Recent Results (from the past 24 hour(s))   CBC Auto Differential    Collection Time: 10/22/20  6:45 PM   Result Value Ref Range    WBC 4.5 3.5 - 11.3 k/uL    RBC 4.32 3.95 - 5.11 m/uL    Hemoglobin 13.0 11.9 - 15.1 g/dL    Hematocrit 40.4 36.3 - 47.1 %    MCV 93.5 82.6 - 102.9 fL    MCH 30.1 25.2 - 33.5 pg    MCHC 32.2 28.4 - 34.8 g/dL    RDW 14.7 (H) 11.8 - 14.4 %    Platelets 499 413 - 700 k/uL    MPV 10.2 8.1 - 13.5 fL    NRBC Automated 0.0 0.0 per 100 WBC    Differential Type NOT REPORTED     Seg Neutrophils 64 36 - 65 %    Lymphocytes 25 24 - 43 %    Monocytes 11 3 - 12 %    Eosinophils % 0 (L) 1 - 4 %    Basophils 0 0 - 2 %    Immature 2 0 - 5 /HPF    Renal Epithelial, UA NOT REPORTED 0 /HPF    Bacteria, UA MODERATE (A) None    Mucus, UA NOT REPORTED None    Trichomonas, UA NOT REPORTED None    Amorphous, UA NOT REPORTED None    Other Observations UA NOT REPORTED NOT REQ. Yeast, UA NOT REPORTED None   COVID-19    Collection Time: 10/22/20  8:34 PM    Specimen: Other   Result Value Ref Range    SARS-CoV-2          SARS-CoV-2, Rapid DETECTED (A) Not Detected    Source . NASOPHARYNGEAL SWAB     SARS-CoV-2         Comprehensive Metabolic Panel w/ Reflex to MG    Collection Time: 10/23/20 12:26 AM   Result Value Ref Range    Glucose 100 (H) 70 - 99 mg/dL    BUN 10 8 - 23 mg/dL    CREATININE 0.58 0.50 - 0.90 mg/dL    Bun/Cre Ratio 17 9 - 20    Calcium 8.9 8.6 - 10.4 mg/dL    Sodium 137 135 - 144 mmol/L    Potassium 4.1 3.7 - 5.3 mmol/L    Chloride 101 98 - 107 mmol/L    CO2 28 20 - 31 mmol/L    Anion Gap 8 (L) 9 - 17 mmol/L    Alkaline Phosphatase 77 35 - 104 U/L    ALT 11 5 - 33 U/L    AST 15 <32 U/L    Total Bilirubin 0.36 0.3 - 1.2 mg/dL    Total Protein 6.2 (L) 6.4 - 8.3 g/dL    Alb 3.6 3.5 - 5.2 g/dL    Albumin/Globulin Ratio NOT REPORTED 1.0 - 2.5    GFR Non-African American >60 >60 mL/min    GFR African American >60 >60 mL/min    GFR Comment          GFR Staging NOT REPORTED    Lactate Dehydrogenase    Collection Time: 10/23/20 12:26 AM   Result Value Ref Range     135 - 214 U/L   Ferritin    Collection Time: 10/23/20 12:26 AM   Result Value Ref Range    Ferritin 155 (H) 13 - 150 ug/L   TYPE AND SCREEN    Collection Time: 10/23/20 12:31 AM   Result Value Ref Range    Expiration Date 10/26/2020,2359     Arm Band Number [id number]     ABO/Rh O NEGATIVE     Antibody Screen NEGATIVE        Imaging/Diagnostics:  Xr Chest Portable    Result Date: 10/22/2020  No acute cardiopulmonary process     Xr Chest 1 View    Result Date: 10/22/2020  Single lateral view again demonstrates the previously noted ring overlying the epigastric region and appears to be intrinsic, possibly in the stomach. Assessment :      Hospital Problems           Last Modified POA    * (Principal) Urinary tract infection without hematuria 10/23/2020 Yes    Drug allergy, multiple 10/23/2020 Yes    Essential hypertension 10/22/2020 Yes    Vascular dementia without behavioral disturbance ([facility] 10/22/2020 Yes    Dyslipidemia 10/22/2020 Yes    History of recurrent UTI (urinary tract infection) 10/22/2020 Yes    GERD (gastroesophageal reflux disease) 10/22/2020 Yes    COVID-19 virus infection 10/22/2020 Yes    Swallowed foreign body 10/23/2020 Yes    Atrial fibrillation ([address] 10/23/2020 Yes          Plan:     Patient status inpatient in the  Progressive Unit/Step down    1. Admit as inpatient to the progressive unit under the internal medicine service  2. Maintain droplet plus isolation  3. COVID-19: Zithromax and Rocephin  4. UTI: IV Rocephin  5. Foreign body: Repeat abdominal x-ray in morning and consider GI consult  6. A. fib: Consult cardiology as patient has extensive allergies, high dose Lovenox per cardiology as rate is controlled  7. Dementia: Continue home Namenda and Aricept  8. Dyslipidemia: Continue home Zetia  9. Hypertension: Continue home hydralazine  10. GERD: Protonix and substitution of Prilosec  11. IV fluids 75 mill per hour  12. DVT prophylaxis: Lovenox  13. Monitor labs of CBC, CMP, PT/INR, PTT, D-dimer, fibrinogen  14. Continuous telemetry monitoring  15. Supplemental oxygen as needed      Consultations:   IP CONSULT TO INTERNAL MEDICINE  IP CONSULT TO CARDIOLOGY     Patient is admitted as inpatient status because of co-morbidities listed above, severity of signs and symptoms as outlined, requirement for current medical therapies and most importantly because of direct risk to patient if care not provided in a hospital setting. Expected length of stay > 48 hours.     Tawana Rubinstein, APRN - CNP  10/23/2020  1:35 AM    Copy sent to Dr. Magan Malloy DO

## 2020-10-23 NOTE — PLAN OF CARE
PRE CONSULT ROUNDING NOTE  HPI  80year old female with pmh of dementia afib GERD recurrent UTI's dyslipidemia, CHF  who presented to the ED for fever. She was exposed to a person who was covid positive. She is being treated for a UTI and she is covid 19 positive. Our service was consulted for a foreign body in the distal esophagus seen on CXR. Pt was not able to give any history in the ED due to her dementia. Per the RN she remains confused but has not had abdominal pain nausea emesis melena or hematemesis. The CXR showed no acute findings other than the foreign body. KUB showed ingested ring likely in the distal esophagus. Labs are unremarkable. Endoscopy unknown  Family reports unknown  Social no smoking no etoh or illicit drugs   /94   Pulse 94   Temp 98.2 °F (36.8 °C) (Axillary)   Resp 18   Wt 86 lb 11.2 oz (39.3 kg)   SpO2 90%   BMI 14.88 kg/m²     ROS unable to be obtained due to dementia   meds labs imaging and past medical records were reviewed    Exam MD notes reviewed and care was d/w primary RN  . Renetta Contreras Due to the current efforts to prevent transmission of COVID-19 and also the need to preserve PPE for other caregivers, a face-to-face encounter with the patient was not performed. That being said, all relevant records and diagnostic tests were reviewed, including laboratory results and imaging. Please reference any relevant documentation elsewhere. Care will be coordinated with the primary service. Assessment  Foreign body (ring) in distal esophagus  covid 19 infection  UTI  dementia    Plan  D/w dr Alejandro Lugo, she does not have any abdominal pain or nausea will defer egd at this time, due to her covid 19 infection- she likely would need to be intubated for the procedure and at increased risk for respiratory complications due to the covid 19. Will recheck kub in am and reassess   antibiotics for uti per primary service  covid 19 mgt per primary/ID    Formal gi consult to follow  . Renetta Shipley Bhavesh Degree - CNP      Attending Physician Statement  I have discussed the care of Rosa Riggs and   I have examined the patient myselft independently, and taken ros and hpi , including pertinent history and exam findings,  with the author of this note . I have reviewed the key elements of all parts of the encounter with the nurse practitioner/resident.     I agree with the assessment, plan and orders as documented by the above health care provider       Patient positive COVID-19  We were planning to do EGD and remove the ring  After discussion with anesthesia  Specially with the patient ate breakfast  And Covid positivity needs to be intubated  Felt to be very risky to intubate this patient with fever and COVID-19 at this time  The risk from that was felt to be higher  Than just watching the ring goes down as it is not a sharp object  We can follow this patient with KUBs  If she passed the 2 weeks and the ring still in the stomach then we can plan EGD and remove it  Case signed to Dr Armida Nam his covering tonight  Electronically signed by Marcos Hernandez MD

## 2020-10-23 NOTE — CARE COORDINATION
Case Management Initial Discharge Plan  Cheryal Epley,             Spoke with:Son, Roosevelt Hamman, via telephone to discuss discharge plans. Information verified: address, contacts, phone number, , insurance Yes  PCP: Gloria Valderrama MD  Date of last visit: 19    Insurance Provider: [address] [place] Medicare    Discharge Planning    Living Arrangements: Memory Care at [address] [place]: children      Home has 0 stories  0 stairs to climb to get into front door, 0 stairs to climb to reach second floor  Location of bedroom/bathroom in home main floor     Patient able to perform ADL's:Assisted    Current Services (outpatient & in home) none  DME equipment: walker, w/c  DME provider: N/A    Pharmacy: [facility]    Potential Assistance Needed:       Patient agreeable to home care: Yes if needed  Freedom of choice provided:  Has used [facility] in the past, and willing to use again. Prior SNF/Rehab Placement and Facility: no  Agreeable to SNF/Rehab: Yes, if needed. Freedom of choice provided: yes   Evaluation: yes    Expected Discharge date:     Patient expects to be discharged to: Follow Up Appointment: Best Day/ Time:      Transportation provider: Son or it will need set up  Transportation arrangements needed for discharge: Possibly son or transport may need set up. Readmission Risk              Risk of Unplanned Readmission:        13             Does patient have a readmission risk score greater than 14?: No  If yes, follow-up appointment must be made within 7 days of discharge. Goal of Care:       Discharge Plan: Spoke with son, Roosevelt Hamman, via phone to obtain information. Pt is admitted for UTI and also tested + for Covid due to exposure to [employer] employee at [facility]. Pt has been resident of [address] [place] [address] since 2020.  Spoke with [facility] [employer], pt is w/c bound and able to pivot for transfers, requires assist to feed at times, and able to communicate needs to toilet. Writer also informed [facility] Quiñones that patient is Covid+, would they be able to take her. Carmina directed writer to [facility], LPN who said they are not able to take her back Covid+, because they don't have the means to provide isolation, and she would require placement to finish quarantine before returning to [facility]. Will need to provide son with choices for SNF. Will need PT/OT ordered and will discuss with Arabella Romo RN. If HHC is needed, then the choice would be [facility]. Will need to watch for A/C at discharge d/t new onset of A-fib and currently on high dose lovenox.           Electronically signed by Ora Rendon RN on 10/23/20 at 9:57 AM EDT

## 2020-10-23 NOTE — FLOWSHEET NOTE
Patient in [place] isolation; no family present. Writer prays for patient in hallway. Spiritual Care will follow as needed.      10/23/20 1313   Encounter Summary   Services provided to: Patient not available   Referral/Consult From: Rounding   Continue Visiting   (10/23/20 COVID isolation)   Complexity of Encounter Low   Length of Encounter 15 minutes   Routine   Type Initial   Assessment Unable to respond   Intervention Prayer

## 2020-10-23 NOTE — PROGRESS NOTES
[facility]  Office: [address], DO, Samantha Johnson, DO, Braulio Mcclain, DO, Wicho Landis, DO, Vasquez Horton MD, Breana Singh MD, Gissel Ernandez MD, Raina Guevara MD, Gustavo Andrade MD, Rehan Oconnor MD, Mack Ge MD, Jaclyn Rain MD, Ariel Willis MD, Dilan Bush DO, Fatou Downs MD, Kala Sheppard MD, Patrick Garcia DO, Sabine Ruano MD,  Serge Salcido DO, Tito Bazzi MD, Daryl Melton MD, Calixto Hicks, [facility], [facility], CNP, Kareem Barreto, CNP, Nghia Simon, CNS, Alana Prabhakar, CNP, Claude Leep, CNP, Karo Angel, CNP, Vanessa Nicholson, CNP, Elio Templeton, CNP, Chau Walker PA-C, Melody Wheatley, [facility], Shelia Rutledge, CNP, Ruth Uriostegui, CNP, Abdon Merritt, CNP, Heath Clarke, CNP, Brayden BandaH[facility]    Progress Note    10/23/2020    4:13 PM    Name:   Suzi Putnam  MRN:     [id number]     [place]:      [de-identified]   Room:   [address] [place] [address] Day:  1  Admit Date:  10/22/2020  6:03 PM    PCP:   Yovany Iniguez DO  Code Status:  Full Code    Subjective:     C/C:   Chief Complaint   Patient presents with    Fever     Interval History Status: not changed. Awake and alert  She does not know where she is or why she is here  Does not appear to have any respiratory symptoms    Brief History:     Per my ETHAN:  Suzi Putnam is a 80 y.o. Non-/non  female who presents with Fever   and is admitted to the hospital for the management of Urinary tract infection without hematuria.     Patient is unable to provide information due to mental status, so information was obtained from the Emergency Department note. Patient was brought to the ED via private EMS for further evaluation of fever. He of hypertension, dementia, dyslipidemia, GERD, recurrent UTIs, and previous CABG. she resides at [facility], where an employee tested positive for Covid. The facility was concerned for Covid due to her fever, so she was sent to the ED for evaluation. She is not hypoxic or requiring oxygen. Her Covid screening was positive. She was noted to be in atrial fibrillation, which she has no history of prior to today. Her urinalysis was positive for large leukocytes, moderate bacteria, and trace hemoglobin. Blood cultures were obtained by the emergency department. Chest x-ray does not note any acute cardiopulmonary process, but does note a ring overlying the epigastric region. She is being admitted for further evaluation and management of urinary tract infection. \"    Review of Systems:     Poor historian    Medications: Allergies:     Allergies   Allergen Reactions    Latex Itching    Actonel [Risedronate Sodium] Other (See Comments)     abdo pain      Adhesive Tape Other (See Comments)     band-aid  band-aid    Aminophylline Itching     Other reaction(s): Unknown    Amlodipine     Celebrex [Celecoxib] Itching     Other reaction(s): Unknown  Other reaction(s): Unknown    Chlorthalidone     Ciprofloxacin      Pain muscles      Codeine Nausea Only     Other reaction(s): Unknown  headache    Colesevelam Hcl     Darvocet A500 [Propoxyphene N-Acetaminophen] Nausea Only     headache    Demerol Hcl [Meperidine] Itching     Other reaction(s): Unknown  Flash backs- and did not help pain  Other reaction(s): Unknown  Flash backs- and did not help pain    Lisinopril     Lovastatin     Macrobid [Nitrofurantoin Monohyd Macro] Hives    Metoprolol     Nitrofuran Derivatives     Nitrofurantoin Hives     Other reaction(s): Unknown    Propoxyphene Nausea Only     headache    Ranitidine     Ranitidine Hcl      pain    Simvastatin     Tramadol Hcl      Other reaction(s): Unknown    Tramadol Hcl      Other reaction(s): Unknown    Trimethadione     Trimethoprim     Ultram [Tramadol] Itching     Other reaction(s): Unknown    Vioxx [Rofecoxib]     Naproxen Rash she has never smoked. She has never used smokeless tobacco. She reports that she does not drink alcohol or use drugs. Family History:   Family History   Problem Relation Age of Onset    Diabetes Mother     Cancer Father     Anemia Father        Vitals:  /85   Pulse 94   Temp 98.2 °F (36.8 °C) (Axillary)   Resp 18   Wt 86 lb 11.2 oz (39.3 kg)   SpO2 90%   BMI 14.88 kg/m²   Temp (24hrs), Av.2 °F (36.8 °C), Min:97.9 °F (36.6 °C), Max:98.8 °F (37.1 °C)    No results for input(s): POCGLU in the last 72 hours. I/O (24Hr): No intake or output data in the 24 hours ending 10/23/20 1613    Labs:  Hematology:  Recent Labs     10/22/20  1845 10/23/20  0505   WBC 4.5 4.1   RBC 4.32 4.33   HGB 13.0 12.9   HCT 40.4 40.6   MCV 93.5 93.8   MCH 30.1 29.8   MCHC 32.2 31.8   RDW 14.7* 14.6*    168   MPV 10.2 10.3   INR  --  1.1   DDIMER  --  0.70*     Chemistry:  Recent Labs     10/22/20  1845 10/23/20  0026 10/23/20  0505   * 137 137   K 4.1 4.1 4.0   CL 98 101 102   CO2 27 28 28   GLUCOSE 105* 100* 91   BUN 12 10 8   CREATININE 0.57 0.58 0.52   ANIONGAP 9 8* 7*   LABGLOM >60 >60 >60   GFRAA >60 >60 >60   CALCIUM 9.1 8.9 8.8     Recent Labs     10/23/20  0026 10/23/20  0505   PROT 6.2* 6.0*   LABALBU 3.6 3.5   AST 15 14   ALT 11 8     --    ALKPHOS 77 74   BILITOT 0.36 0.34     ABG:  Lab Results   Component Value Date    FIO2 NOT REPORTED 2016     Lab Results   Component Value Date/Time    SPECIAL LAC 10ML 10/22/2020 07:45 PM     Lab Results   Component Value Date/Time    CULTURE NO GROWTH 15 HOURS 10/22/2020 07:45 PM       Radiology:  Xr Abdomen (kub) (single Ap View)    Result Date: 10/23/2020  Ingested ring overlying the lower mediastinum superior to the diaphragm presumably in the distal esophagus.      Xr Chest Portable    Result Date: 10/22/2020  No acute cardiopulmonary process     Xr Chest 1 View    Result Date: 10/22/2020  Single lateral view again demonstrates the previously noted ring overlying the epigastric region and appears to be intrinsic, possibly in the stomach. Physical Examination:        General appearance:  alert, cooperative and no distress  Mental Status:  oriented to person and normal affect  Lungs:  clear to auscultation bilaterally, normal effort  Heart:  regular rate and rhythm, no murmur  Abdomen:  soft, nontender, nondistended, normal bowel sounds, no masses, hepatomegaly, splenomegaly  Extremities:  no edema, redness, tenderness in the calves  Skin:  no gross lesions, rashes, induration    Assessment:        Hospital Problems           Last Modified POA    * (Principal) Urinary tract infection without hematuria 10/23/2020 Yes    Drug allergy, multiple 10/23/2020 Yes    Essential hypertension 10/22/2020 Yes    Vascular dementia without behavioral disturbance ([address] 10/22/2020 Yes    Dyslipidemia 10/22/2020 Yes    History of recurrent UTI (urinary tract infection) 10/22/2020 Yes    GERD (gastroesophageal reflux disease) 10/22/2020 Yes    COVID-19 virus infection 10/22/2020 Yes    Swallowed foreign body 10/23/2020 Yes    Paroxysmal atrial fibrillation ([address] 10/23/2020 Yes          Plan:        1. Cont antibiotics for uti, awaiting final cultures  2.  No need for any covid treatment at this time  3. GI consulted: I had d/w Dr Helene Corona earlier in day    [facility], DO  10/23/2020  4:13 PM

## 2020-10-23 NOTE — PROGRESS NOTES
Janay Wayne was ordered Acidophilus capsule. As per the [address] [facility], herbals and certain dietary supplements will be discontinued. The herbal or dietary supplement may be continued after discharge from the hospital.     If you feel the patient needs to continue their home herbal therapy during the inpatient stay, the patient may bring an unopened bottle for verification and administration pursuant to our home medication use policy. Please contact the pharmacy with any questions or concerns. Thank you.   Maxine Anderson   10/23/2020  12:15 AM

## 2020-10-23 NOTE — PROGRESS NOTES
Physical Therapy    Facility/Department: [facility] CARE  Initial Assessment    NAME: Trent Guzman  : 1929  MRN: [id number]    Date of Service: 10/23/2020    Discharge Recommendations:  Subacute/Skilled Nursing Facility        Assessment   Body structures, Functions, Activity limitations: Decreased functional mobility ; Decreased ROM; Decreased strength;Decreased safe awareness;Decreased cognition;Decreased balance;Decreased endurance  Assessment: Pt admitted with fever and exposure to COVID. Pt also reported chest pain on admission. Pt rigid and fearful during evaluation. Per RN report patient was getting up in w/c prior to admit. Will progress towards back to baseline and continue seeking additional details re: pTA. Pt repositioned post treatment and bed alarm in place. Specific instructions for Next Treatment: mobility as tolerated. Prognosis: Fair  Decision Making: High Complexity  Clinical Presentation: unstable. Currently in new onset Afib. Tachy at rest.  PT Education: Goals;Orientation  REQUIRES PT FOLLOW UP: Yes  Activity Tolerance  Activity Tolerance: Patient Tolerated treatment well       Patient Diagnosis(es): The primary encounter diagnosis was Urinary tract infection without hematuria, site unspecified. Diagnoses of COVID-19 and Swallowed foreign body, initial encounter were also pertinent to this visit.      has a past medical history of Abnormal cardiovascular stress test, Acute confusion, Acute coronary syndrome (HCC), Acute right-sided CHF (congestive heart failure) ([address] Angina pectoris (HCC), Arthritis, Atrial fibrillation ([address] CAD (coronary artery disease), Calculus of kidney, CHF (congestive heart failure) ([address] Chronic cystitis, Chronic renal failure, stage 3 (moderate), Dementia ([address] Dyslipidemia, Essential hypertension, GERD (gastroesophageal reflux disease), GERD (gastroesophageal reflux disease), Hx of mitral valve repair, Hypertension, Hypokalemia, assistance  Supine to Sit: Minimal assistance  Sit to Supine: Minimal assistance  Transfers  Sit to Stand: Minimal Assistance  Stand to sit: Minimal Assistance  Bed to Chair: Minimal assistance  Ambulation  Ambulation?: No     Balance  Sitting - Static: Fair  Sitting - Dynamic: Fair  Standing - Static: Poor    Bed mobility / rolling left/ right. Pressure points propped. Supine stretch. Dangled bedside x ~ 5 minutes. Attempted standing ~ 5 seconds partial standing. Plan   Plan  Times per week: 1-2x/day. 5x/week. Specific instructions for Next Treatment: mobility as tolerated. Current Treatment Recommendations: Strengthening, Home Exercise Program, Neuromuscular Re-education, ROM, Cognitive/Perceptual Training, Safety Education & Training, Patient/Caregiver Education & Training, Endurance Training, Balance Training, Functional Mobility Training, Transfer Training, Gait Training, Positioning  Safety Devices  Type of devices: All fall risk precautions in place, Call light within reach, Bed alarm in place, Left in bed    AM-PAC Score 8/24     Goals  Short term goals  Time Frame for Short term goals: 6 tx's  Short term goal 1: Transfers CGA +1  Short term goal 2: Determine prior level and ambulate short distances min assist +1 if appropriate  Short term goal 3: Develop stretching/ maintenance program for next care giver to ensure ROM for safe hygiene.   Patient Goals   Patient goals : Unable to verbalize     Therapy Time   Individual Concurrent Group Co-treatment   Time In 1430         Time Out 1503         Minutes 33            ITZ BRITT, PT

## 2020-10-24 ENCOUNTER — APPOINTMENT (OUTPATIENT)
Dept: GENERAL RADIOLOGY | Age: 85
DRG: 177 | End: 2020-10-24
Payer: MEDICARE

## 2020-10-24 LAB
ABSOLUTE EOS #: <0.03 K/UL (ref 0–0.44)
ABSOLUTE IMMATURE GRANULOCYTE: 0.01 K/UL (ref 0–0.3)
ABSOLUTE LYMPH #: 1.04 K/UL (ref 1.1–3.7)
ABSOLUTE MONO #: 0.52 K/UL (ref 0.1–1.2)
ALBUMIN SERPL-MCNC: 3.8 G/DL (ref 3.5–5.2)
ALBUMIN/GLOBULIN RATIO: NORMAL (ref 1–2.5)
ALP BLD-CCNC: 78 U/L (ref 35–104)
ALT SERPL-CCNC: 10 U/L (ref 5–33)
ANION GAP SERPL CALCULATED.3IONS-SCNC: 10 MMOL/L (ref 9–17)
AST SERPL-CCNC: 16 U/L
BASOPHILS # BLD: 0 % (ref 0–2)
BASOPHILS ABSOLUTE: <0.03 K/UL (ref 0–0.2)
BILIRUB SERPL-MCNC: 0.39 MG/DL (ref 0.3–1.2)
BUN BLDV-MCNC: 10 MG/DL (ref 8–23)
BUN/CREAT BLD: 16 (ref 9–20)
CALCIUM SERPL-MCNC: 9.2 MG/DL (ref 8.6–10.4)
CHLORIDE BLD-SCNC: 100 MMOL/L (ref 98–107)
CO2: 27 MMOL/L (ref 20–31)
CREAT SERPL-MCNC: 0.62 MG/DL (ref 0.5–0.9)
DIFFERENTIAL TYPE: ABNORMAL
EOSINOPHILS RELATIVE PERCENT: 0 % (ref 1–4)
GFR AFRICAN AMERICAN: >60 ML/MIN
GFR NON-AFRICAN AMERICAN: >60 ML/MIN
GFR SERPL CREATININE-BSD FRML MDRD: NORMAL ML/MIN/{1.73_M2}
GFR SERPL CREATININE-BSD FRML MDRD: NORMAL ML/MIN/{1.73_M2}
GLUCOSE BLD-MCNC: 98 MG/DL (ref 70–99)
HCT VFR BLD CALC: 43.5 % (ref 36.3–47.1)
HEMOGLOBIN: 14.1 G/DL (ref 11.9–15.1)
IMMATURE GRANULOCYTES: 0 %
LYMPHOCYTES # BLD: 28 % (ref 24–43)
MCH RBC QN AUTO: 29.7 PG (ref 25.2–33.5)
MCHC RBC AUTO-ENTMCNC: 32.4 G/DL (ref 28.4–34.8)
MCV RBC AUTO: 91.8 FL (ref 82.6–102.9)
MONOCYTES # BLD: 14 % (ref 3–12)
NRBC AUTOMATED: 0 PER 100 WBC
PDW BLD-RTO: 14.2 % (ref 11.8–14.4)
PLATELET # BLD: 192 K/UL (ref 138–453)
PLATELET ESTIMATE: ABNORMAL
PMV BLD AUTO: 10.2 FL (ref 8.1–13.5)
POTASSIUM SERPL-SCNC: 3.8 MMOL/L (ref 3.7–5.3)
RBC # BLD: 4.74 M/UL (ref 3.95–5.11)
RBC # BLD: ABNORMAL 10*6/UL
SEG NEUTROPHILS: 58 % (ref 36–65)
SEGMENTED NEUTROPHILS ABSOLUTE COUNT: 2.13 K/UL (ref 1.5–8.1)
SODIUM BLD-SCNC: 137 MMOL/L (ref 135–144)
TOTAL PROTEIN: 6.5 G/DL (ref 6.4–8.3)
WBC # BLD: 3.7 K/UL (ref 3.5–11.3)
WBC # BLD: ABNORMAL 10*3/UL

## 2020-10-24 PROCEDURE — 85025 COMPLETE CBC W/AUTO DIFF WBC: CPT

## 2020-10-24 PROCEDURE — 1200000000 HC SEMI PRIVATE

## 2020-10-24 PROCEDURE — 6370000000 HC RX 637 (ALT 250 FOR IP): Performed by: INTERNAL MEDICINE

## 2020-10-24 PROCEDURE — APPSS30 APP SPLIT SHARED TIME 16-30 MINUTES: Performed by: NURSE PRACTITIONER

## 2020-10-24 PROCEDURE — 97112 NEUROMUSCULAR REEDUCATION: CPT

## 2020-10-24 PROCEDURE — 99232 SBSQ HOSP IP/OBS MODERATE 35: CPT | Performed by: INTERNAL MEDICINE

## 2020-10-24 PROCEDURE — 36415 COLL VENOUS BLD VENIPUNCTURE: CPT

## 2020-10-24 PROCEDURE — 2580000003 HC RX 258: Performed by: NURSE PRACTITIONER

## 2020-10-24 PROCEDURE — 74018 RADEX ABDOMEN 1 VIEW: CPT

## 2020-10-24 PROCEDURE — 6360000002 HC RX W HCPCS: Performed by: NURSE PRACTITIONER

## 2020-10-24 PROCEDURE — 97530 THERAPEUTIC ACTIVITIES: CPT

## 2020-10-24 PROCEDURE — 80053 COMPREHEN METABOLIC PANEL: CPT

## 2020-10-24 RX ADMIN — Medication 10 ML: at 21:03

## 2020-10-24 RX ADMIN — CEFTRIAXONE SODIUM 1 G: 1 INJECTION, POWDER, FOR SOLUTION INTRAMUSCULAR; INTRAVENOUS at 21:02

## 2020-10-24 RX ADMIN — DILTIAZEM HYDROCHLORIDE 30 MG: 30 TABLET, FILM COATED ORAL at 12:26

## 2020-10-24 RX ADMIN — QUETIAPINE FUMARATE 25 MG: 25 TABLET ORAL at 09:10

## 2020-10-24 RX ADMIN — SODIUM CHLORIDE: 9 INJECTION, SOLUTION INTRAVENOUS at 04:03

## 2020-10-24 RX ADMIN — ENOXAPARIN SODIUM 60 MG: 60 INJECTION SUBCUTANEOUS at 09:10

## 2020-10-24 RX ADMIN — QUETIAPINE FUMARATE 25 MG: 25 TABLET ORAL at 12:26

## 2020-10-24 NOTE — PROGRESS NOTES
[facility]   Section of Cardiology  Progress Note      Date:  10/24/2020  Patient: Maricarmen Livingston  Admission:  10/22/2020  6:03 PM  Admit DX: UTI (urinary tract infection) [N39.0]  Age:  80 y. o., 1/12/1929                           LOS: 2 days     Reason for evaluation:   atrial fibrillation      SUBJECTIVE:     The patient was seen and examined. Notes and labs reviewed. There were not complications over night. OBJECTIVE:    /63   Pulse 84   Temp 98.4 °F (36.9 °C) (Oral)   Resp 16   Wt 86 lb 11.2 oz (39.3 kg)   SpO2 93%   BMI 14.88 kg/m²   No intake or output data in the 24 hours ending 10/24/20 0940    EXAM:   Unable to do due to corona virus pneumonia. Current Inpatient Medications:   sodium chloride flush  10 mL Intravenous 2 times per day    cefTRIAXone (ROCEPHIN) IV  1 g Intravenous Q24H    melatonin  9 mg Oral Nightly    QUEtiapine  100 mg Oral Daily    QUEtiapine  25 mg Oral 4x Daily    sodium chloride  1,000 mL Intravenous Once    enoxaparin  1 mg/kg Subcutaneous BID       IV Infusions (if any):   sodium chloride 75 mL/hr at 10/24/20 0403       Diagnostics:     Labs:   CBC:   Recent Labs     10/23/20  0505 10/24/20  0555   WBC 4.1 3.7   HGB 12.9 14.1   HCT 40.6 43.5    192     BMP:   Recent Labs     10/23/20  0505 10/24/20  0555    137   K 4.0 3.8   CO2 28 27   BUN 8 10   CREATININE 0.52 0.62   LABGLOM >60 >60   GLUCOSE 91 98     BNP: No results for input(s): BNP in the last 72 hours. PT/INR:   Recent Labs     10/23/20  0505   PROTIME 13.5   INR 1.1     APTT:  Recent Labs     10/23/20  0505   APTT 42.6*     CARDIAC ENZYMES:No results for input(s): CKTOTAL, CKMB, CKMBINDEX, TROPONINI in the last 72 hours.   FASTING LIPID PANEL:  Lab Results   Component Value Date    HDL 43 06/17/2019    TRIG 140 06/17/2019     LIVER PROFILE:  Recent Labs     10/23/20  0505 10/24/20  0555   AST 14 16   ALT 8 10   LABALBU 3.5 3.8       ASSESSMENT:    Patient Active Problem List   Diagnosis    Renal insufficiency    Urinary incontinence    Osteoporosis    Abnormal cardiovascular stress test    Pulmonary hypertension due to mitral valve disease ([facility]    Hx of mitral valve repair    S/P CABG x 2    Right HF (heart failure) secondary to left HF (heart failure) ([facility]    Acute right-sided CHF (congestive heart failure) ([facility])    Acute coronary syndrome ([facility]    Drug allergy, multiple    Unstable angina ([facility]    Essential hypertension    Vascular dementia without behavioral disturbance ([facility]    Dyslipidemia    Urinary bladder disorder    History of recurrent UTI (urinary tract infection)    Angina pectoris ([facility])    SOB (shortness of breath) on exertion    Dizziness    GERD (gastroesophageal reflux disease)    Chronic renal failure, stage 3 (moderate)    Weakness    Hypokalemia    Acute confusion    Chronic cystitis    Hydronephrosis, right    Neurogenic bladder    Hypertension    Osteoporosis    Acquired cystic kidney disease    Calculus of kidney    Renal cyst, left    Stress incontinence of urine    Urge incontinence of urine    Recurrent UTI    Urinary retention with incomplete bladder emptying    COVID-19 virus infection    Urinary tract infection without hematuria    Swallowed foreign body    Paroxysmal atrial fibrillation ([facility])       PLAN:    1. Paroxysmal atrial fibrillation  2. Hypertension  3. CKD  Plan  Rate control of atrial fibrillation is poor. I will start diltiazem 30mg po tid. Discontinue lovenox and start eliquis 2.5mg po bid instead. Please see orders. Discussed with  nursing.     Eun Weldon MD

## 2020-10-24 NOTE — PROGRESS NOTES
Am assessment and vitals complete, pt confused and does not follow commands most of the time, telesitter in place for safety, pt given meds with pudding after much convincing, will continue to monitor

## 2020-10-24 NOTE — PROGRESS NOTES
Physical Therapy  Facility/Department: [facility] PROGRESSIVE CARE  Daily Treatment Note  NAME: Jameson Amador  : 1929  MRN: [id number]    Date of Service: 10/24/2020    Discharge Recommendations:  Subacute/Skilled Nursing Facility        Assessment   Body structures, Functions, Activity limitations: Decreased functional mobility ; Decreased ROM; Decreased strength;Decreased safe awareness;Decreased cognition;Decreased balance;Decreased endurance  Specific instructions for Next Treatment: mobility as tolerated. Prognosis: Fair  Decision Making: High Complexity  Clinical Presentation: unstable. Currently in new onset Afib. Tachy at rest.  REQUIRES PT FOLLOW UP: Yes  Activity Tolerance  Activity Tolerance: Patient Tolerated treatment well     Patient Diagnosis(es): The primary encounter diagnosis was Urinary tract infection without hematuria, site unspecified. Diagnoses of COVID-19 and Swallowed foreign body, initial encounter were also pertinent to this visit.      has a past medical history of Abnormal cardiovascular stress test, Acute confusion, Acute coronary syndrome (HCC), Acute right-sided CHF (congestive heart failure) ([address] Angina pectoris ([address] Arthritis, Atrial fibrillation ([address] CAD (coronary artery disease), Calculus of kidney, CHF (congestive heart failure) ([address] Chronic cystitis, Chronic renal failure, stage 3 (moderate), Dementia ([address] Dyslipidemia, Essential hypertension, GERD (gastroesophageal reflux disease), GERD (gastroesophageal reflux disease), Hx of mitral valve repair, Hypertension, Hypokalemia, Neurogenic bladder, Osteoporosis, Pulmonary hypertension due to mitral valve disease ([address] Renal cyst, left, Renal insufficiency, Right HF (heart failure) secondary to left HF (heart failure) ([address] S/P CABG x 2, Stress incontinence of urine, Unstable angina ([address] Urge incontinence of urine, Urinary incontinence, UTI (urinary tract infection), and Vascular dementia without behavioral disturbance ([facility] has a past surgical history that includes Hysterectomy; Dilation and curettage of uterus; Appendectomy; hernia repair; Eye surgery (Right); Coronary artery bypass graft (N/A, 2003); and Cardiac catheterization (4-). Restrictions  Restrictions/Precautions  Restrictions/Precautions: Up as Tolerated, Fall Risk, Isolation  Position Activity Restriction  Other position/activity restrictions: Droplet Plus Isolation for COVID-19. Dementia. Subjective   General  Chart Reviewed: Yes  Response To Previous Treatment: Patient with no complaints from previous session. Family / Caregiver Present: No  Subjective  Subjective: very limited verbal response          Orientation     Cognition      Objective   Bed mobility  Bridging: Minimal assistance  Transfers  Sit to Stand: Moderate Assistance  Stand to sit: Moderate Assistance        Balance  Sitting - Static: Fair  Sitting - Dynamic: Fair  Standing - Static: Poor  Comments: not able to perform dynamic                             Goals  Short term goals  Time Frame for Short term goals: 6 tx's  Short term goal 1: Transfers CGA +1  Short term goal 2: Determine prior level and ambulate short distances min assist +1 if appropriate  Short term goal 3: Develop stretching/ maintenance program for next care giver to ensure ROM for safe hygiene. Patient Goals   Patient goals : Unable to verbalize    Plan    Plan  Times per week: 1-2x/day. 5x/week. Specific instructions for Next Treatment: mobility as tolerated. Current Treatment Recommendations: Strengthening, Home Exercise Program, Neuromuscular Re-education, ROM, Cognitive/Perceptual Training, Safety Education & Training, Patient/Caregiver Education & Training, Endurance Training, Balance Training, Functional Mobility Training, Transfer Training, Gait Training, Positioning  Safety Devices  Type of devices:  All fall risk precautions in place, Call light within reach, Bed alarm in place, Left in bed Therapy Time   Individual Concurrent Group Co-treatment   Time In  1024         Time Out  1049         Minutes                   Anderson READ

## 2020-10-24 NOTE — PLAN OF CARE
Problem: Falls - Risk of:  Goal: Will remain free from falls  Description: Will remain free from falls  Outcome: Ongoing     Pt remains confused, pt has dementia, bed alarm on at all time, frequent rounds made, telesitter in place, other treatments continue

## 2020-10-24 NOTE — PROGRESS NOTES
GI Progress notes    10/24/2020   9:03 AM    Name:  Efrain Montesinos  MRN:    [id number]     Acct:     [de-identified]   Room:  [address] [place] [address] Day: 2     Admit Date: 10/22/2020  6:03 PM  PCP: Shanice Posada DO    Subjective:     C/C:   Chief Complaint   Patient presents with    Fever       Interval History: Status: not changed. Follow-up foreign body. Repeat KUB shows ingested ring still likely in the distal esophagus. No reported abdominal pain, nausea, vomiting. Tolerating a general diet. Currently in isolation for confirmed covid-19. ROS:  Unable to obtain due to dementia    Medications: Allergies:    Allergies   Allergen Reactions    Latex Itching    Actonel [Risedronate Sodium] Other (See Comments)     abdo pain      Adhesive Tape Other (See Comments)     band-aid  band-aid    Aminophylline Itching     Other reaction(s): Unknown    Amlodipine     Celebrex [Celecoxib] Itching     Other reaction(s): Unknown  Other reaction(s): Unknown    Chlorthalidone     Ciprofloxacin      Pain muscles      Codeine Nausea Only     Other reaction(s): Unknown  headache    Colesevelam Hcl     Darvocet A500 [Propoxyphene N-Acetaminophen] Nausea Only     headache    Demerol Hcl [Meperidine] Itching     Other reaction(s): Unknown  Flash backs- and did not help pain  Other reaction(s): Unknown  Flash backs- and did not help pain    Lisinopril     Lovastatin     Macrobid [Nitrofurantoin Monohyd Macro] Hives    Metoprolol     Nitrofuran Derivatives     Nitrofurantoin Hives     Other reaction(s): Unknown    Propoxyphene Nausea Only     headache    Ranitidine     Ranitidine Hcl      pain    Simvastatin     Tramadol Hcl      Other reaction(s): Unknown    Tramadol Hcl      Other reaction(s): Unknown    Trimethadione     Trimethoprim     Ultram [Tramadol] Itching     Other reaction(s): Unknown    Vioxx [Rofecoxib]     Naproxen Rash and Itching       rash    Sulfa Antibiotics Rash and Itching Not on file    Intimate partner violence     Fear of current or ex partner: Not on file     Emotionally abused: Not on file     Physically abused: Not on file     Forced sexual activity: Not on file   Other Topics Concern    Not on file   Social History Narrative    Not on file       Vitals:  /63   Pulse 84   Temp 98.4 °F (36.9 °C) (Oral)   Resp 16   Wt 86 lb 11.2 oz (39.3 kg)   SpO2 93%   BMI 14.88 kg/m²   Temp (24hrs), Av °F (36.7 °C), Min:97.5 °F (36.4 °C), Max:98.4 °F (36.9 °C)    No results for input(s): POCGLU in the last 72 hours. I/O (24Hr):   No intake or output data in the 24 hours ending 10/24/20 0903    Labs:      CBC:   Lab Results   Component Value Date    WBC 3.7 10/24/2020    RBC 4.74 10/24/2020    HGB 14.1 10/24/2020    HCT 43.5 10/24/2020    MCV 91.8 10/24/2020    MCH 29.7 10/24/2020    MCHC 32.4 10/24/2020    RDW 14.2 10/24/2020     10/24/2020    MPV 10.2 10/24/2020     CBC with Differential:    Lab Results   Component Value Date    WBC 3.7 10/24/2020    RBC 4.74 10/24/2020    HGB 14.1 10/24/2020    HCT 43.5 10/24/2020     10/24/2020    MCV 91.8 10/24/2020    MCH 29.7 10/24/2020    MCHC 32.4 10/24/2020    RDW 14.2 10/24/2020    LYMPHOPCT 28 10/24/2020    MONOPCT 14 10/24/2020    BASOPCT 0 10/24/2020    MONOSABS 0.52 10/24/2020    LYMPHSABS 1.04 10/24/2020    EOSABS <0.03 10/24/2020    BASOSABS <0.03 10/24/2020    DIFFTYPE NOT REPORTED 10/24/2020     Hemoglobin/Hematocrit:    Lab Results   Component Value Date    HGB 14.1 10/24/2020    HCT 43.5 10/24/2020     CMP:    Lab Results   Component Value Date     10/24/2020    K 3.8 10/24/2020     10/24/2020    CO2 27 10/24/2020    BUN 10 10/24/2020    CREATININE 0.62 10/24/2020    GFRAA >60 10/24/2020    LABGLOM >60 10/24/2020    GLUCOSE 98 10/24/2020    GLUCOSE 108 2012    PROT 6.5 10/24/2020    LABALBU 3.8 10/24/2020    LABALBU 4.6 2012    CALCIUM 9.2 10/24/2020    BILITOT 0.39 10/24/2020 ALKPHOS 78 10/24/2020    AST 16 10/24/2020    ALT 10 10/24/2020     BMP:    Lab Results   Component Value Date     10/24/2020    K 3.8 10/24/2020     10/24/2020    CO2 27 10/24/2020    BUN 10 10/24/2020    LABALBU 3.8 10/24/2020    LABALBU 4.6 03/02/2012    CREATININE 0.62 10/24/2020    CALCIUM 9.2 10/24/2020    GFRAA >60 10/24/2020    LABGLOM >60 10/24/2020    GLUCOSE 98 10/24/2020    GLUCOSE 108 03/02/2012     PT/INR:    Lab Results   Component Value Date    PROTIME 13.5 10/23/2020    INR 1.1 10/23/2020     PTT:    Lab Results   Component Value Date    APTT 42.6 10/23/2020   [APTT}    Physical Examination:        Due to the current efforts to prevent transmission of COVID-19 and also the need to preserve PPE for other caregivers, a face-to-face encounter with the patient was not performed. That being said, all relevant records and diagnostic tests were reviewed, including laboratory results and imaging. Please reference any relevant documentation elsewhere. Care will be coordinated with the primary service    Assessment:        Primary Problem  Urinary tract infection without hematuria     Active Hospital Problems    Diagnosis Date Noted    Urinary tract infection without hematuria [N39.0] 10/23/2020    Swallowed foreign body [T18. 9XXA] 10/23/2020    Paroxysmal atrial fibrillation ([facility] [I48.0] 10/23/2020    COVID-19 virus infection [U07.1] 10/22/2020    GERD (gastroesophageal reflux disease) [K21.9] 03/14/2019    History of recurrent UTI (urinary tract infection) [Z87.440] 04/03/2016    Vascular dementia without behavioral disturbance ([facility] [F01.50] 12/04/2015    Essential hypertension [I10] 12/04/2015    Dyslipidemia [E78.5] 12/04/2015    Drug allergy, multiple [Z88.9] 04/25/2015     Past Medical History:   Diagnosis Date    Abnormal cardiovascular stress test 2/28/2014    Acute confusion     Acute coronary syndrome ([facility] 4/25/2014    Acute right-sided CHF (congestive heart failure) ([address] 2/28/2014    Acute on chronic assoc w/ chronic MV disease     Angina pectoris ([address] 4/22/2016    worse     Arthritis     Atrial fibrillation ([address] 10/23/2020    CAD (coronary artery disease)     Calculus of kidney 5/17/2019    CHF (congestive heart failure) (HCC)     Chronic cystitis 5/17/2019    Chronic renal failure, stage 3 (moderate) 4/19/2019    Dementia ([address]     Dyslipidemia     Essential hypertension 12/4/2015    GERD (gastroesophageal reflux disease)     GERD (gastroesophageal reflux disease)     Hx of mitral valve repair 2/28/2014    And annuloplasty ring     Hypertension     Hypokalemia     Neurogenic bladder 5/17/2019    Osteoporosis     Pulmonary hypertension due to mitral valve disease ([address] 2/28/2014    Renal cyst, left 5/17/2019    Renal insufficiency     Right HF (heart failure) secondary to left HF (heart failure) ([address] 2/28/2014    S/P CABG x 2 2/28/2014    LIMA-LAD (occluded), SVG-RCA (patent 2/14 cath)     Stress incontinence of urine 5/17/2019    Unstable angina ([address] 5/29/2015    Urge incontinence of urine 5/17/2019    Urinary incontinence     UTI (urinary tract infection) 4/19/2019    Vascular dementia without behavioral disturbance ([address] 12/4/2015        Plan:        1. FB distal esophagus  1. Discussed with RN  2. No nausea, vomiting  3. Tolerating general diet  4. Defer EGD at present time  5. Serial KUB    Explained to the patient and d/W Nursing Staff  Will F/U with you  Please call or Page for any issues or change in status  Thanks  Patient tolerating diet. Patient appears unstable for endoscopic therapies.   Electronically signed by Reyes Brothers, APRN - NP on 10/24/2020 at 9:03 AM

## 2020-10-24 NOTE — CARE COORDINATION
Discharge planning    Chart reviewed. Patient + COVID from [facility]. They cannot accept back to covid status. Will need to discuss with their director on the parameters for returning. ( negative testing, quarintine ex. Mainor Angel )      Son Codie Ferron called and did have discussion regarding the above. Did update that therapy is recommending snf and would have to find one that could accept covid.   Codie Peck is in agreement with snf placement and left vm with ss to have her follow up with family with locations that can accept with covid

## 2020-10-24 NOTE — PROGRESS NOTES
[facility]  Office: [address], DO, Kim Bakari, DO, Chelsea Manifold, DO, Stephen Roldan Blood, DO, Keshawn Kohler MD, Fortunato Jones MD, Yady Kaplan MD, Sanjiv West MD, Etienne Ritchie MD, Rocio Vela MD, Natali Medley MD, Trang Robert MD, Mbkirstie Paula MD, Neftali Logan, DO, Beryl Waggoner MD, Davis Harvey MD, Ela Rob DO, Sree Agustin MD,  Sayra Lundberg DO, Norris Holter, MD, Luis Fernando Galloway MD, Marylou Garcias, CNP, [facility]to, CNP, Micheline Mercado, CNP, Guilherme Courtney, CNS, Mor Jackson, CNP, Atilio Phan, CNP, Grecia Tuttle, CNP, Ansley Kumar, CNP, Xavi Wright, CNP, Julio C Arredondo PA-C, Cain Kamara, [facility], Sahron Bragg, CNP, Lyla Vela, CNP, Margaret Loop, CNP, Dilan Sloan, CNP, Venecia Contreras, [facility]    Progress Note    10/24/2020    9:26 AM    Name:   Dmitry Workman  MRN:     [id number]     [place]:      [de-identified]   Room:   [address] [place], [place] [address] Day:  2  Admit Date:  10/22/2020  6:03 PM    PCP:   Sandhya Hood DO  Code Status:  Full Code    Subjective:     C/C:   Chief Complaint   Patient presents with    Fever     Interval History Status: not changed. Patient seen during the COVID-19 pandemic. In attempt to limit exposure and preserve PPE-- resources saved for most critical of patients. Writer observed patient outside of room. No physical examination performed. Brief History:     Per my ETHAN:  Dmitry Workman is a 80 y.o. Non-/non  female who presents with Fever   and is admitted to the hospital for the management of Urinary tract infection without hematuria.     Patient is unable to provide information due to mental status, so information was obtained from the Emergency Department note. Patient was brought to the ED via private EMS for further evaluation of fever.   He of hypertension, dementia, dyslipidemia, GERD, recurrent UTIs, and previous CABG. she resides at [facility], where an employee tested positive for Covid. The facility was concerned for Covid due to her fever, so she was sent to the ED for evaluation. She is not hypoxic or requiring oxygen. Her Covid screening was positive. She was noted to be in atrial fibrillation, which she has no history of prior to today. Her urinalysis was positive for large leukocytes, moderate bacteria, and trace hemoglobin. Blood cultures were obtained by the emergency department. Chest x-ray does not note any acute cardiopulmonary process, but does note a ring overlying the epigastric region. She is being admitted for further evaluation and management of urinary tract infection. \"    Review of Systems:     Poor historian    Medications: Allergies:     Allergies   Allergen Reactions    Latex Itching    Actonel [Risedronate Sodium] Other (See Comments)     abdo pain      Adhesive Tape Other (See Comments)     band-aid  band-aid    Aminophylline Itching     Other reaction(s): Unknown    Amlodipine     Celebrex [Celecoxib] Itching     Other reaction(s): Unknown  Other reaction(s): Unknown    Chlorthalidone     Ciprofloxacin      Pain muscles      Codeine Nausea Only     Other reaction(s): Unknown  headache    Colesevelam Hcl     Darvocet A500 [Propoxyphene N-Acetaminophen] Nausea Only     headache    Demerol Hcl [Meperidine] Itching     Other reaction(s): Unknown  Flash backs- and did not help pain  Other reaction(s): Unknown  Flash backs- and did not help pain    Lisinopril     Lovastatin     Macrobid [Nitrofurantoin Monohyd Macro] Hives    Metoprolol     Nitrofuran Derivatives     Nitrofurantoin Hives     Other reaction(s): Unknown    Propoxyphene Nausea Only     headache    Ranitidine     Ranitidine Hcl      pain    Simvastatin     Tramadol Hcl      Other reaction(s): Unknown    Tramadol Hcl      Other reaction(s): Unknown    Trimethadione     Trimethoprim  Ultram [Tramadol] Itching     Other reaction(s): Unknown    Vioxx [Rofecoxib]     Naproxen Rash and Itching       rash    Sulfa Antibiotics Rash and Itching     Other reaction(s): Unknown       Current Meds:   Scheduled Meds:    sodium chloride flush  10 mL Intravenous 2 times per day    cefTRIAXone (ROCEPHIN) IV  1 g Intravenous Q24H    melatonin  9 mg Oral Nightly    QUEtiapine  100 mg Oral Daily    QUEtiapine  25 mg Oral 4x Daily    sodium chloride  1,000 mL Intravenous Once    enoxaparin  1 mg/kg Subcutaneous BID     Continuous Infusions:    sodium chloride 75 mL/hr at 10/24/20 0403     PRN Meds: sodium chloride flush, acetaminophen **OR** acetaminophen, polyethylene glycol, promethazine **OR** ondansetron    Data:     Past Medical History:   has a past medical history of Abnormal cardiovascular stress test, Acute confusion, Acute coronary syndrome ([address] Acute right-sided CHF (congestive heart failure) ([address] Angina pectoris (HCC), Arthritis, Atrial fibrillation ([address] CAD (coronary artery disease), Calculus of kidney, CHF (congestive heart failure) ([address] Chronic cystitis, Chronic renal failure, stage 3 (moderate), Dementia ([address] Dyslipidemia, Essential hypertension, GERD (gastroesophageal reflux disease), GERD (gastroesophageal reflux disease), Hx of mitral valve repair, Hypertension, Hypokalemia, Neurogenic bladder, Osteoporosis, Pulmonary hypertension due to mitral valve disease ([address] Renal cyst, left, Renal insufficiency, Right HF (heart failure) secondary to left HF (heart failure) ([address] S/P CABG x 2, Stress incontinence of urine, Unstable angina ([address] Urge incontinence of urine, Urinary incontinence, UTI (urinary tract infection), and Vascular dementia without behavioral disturbance ([address] Social History:   reports that she has never smoked. She has never used smokeless tobacco. She reports that she does not drink alcohol or use drugs.      Family History:   Family History   Problem Relation Age of Onset    Diabetes Mother     Cancer Father     Anemia Father        Vitals:  /63   Pulse 84   Temp 98.4 °F (36.9 °C) (Oral)   Resp 16   Wt 86 lb 11.2 oz (39.3 kg)   SpO2 93%   BMI 14.88 kg/m²   Temp (24hrs), Av °F (36.7 °C), Min:97.5 °F (36.4 °C), Max:98.4 °F (36.9 °C)    No results for input(s): POCGLU in the last 72 hours. I/O (24Hr): No intake or output data in the 24 hours ending 10/24/20 09    Labs:  Hematology:  Recent Labs     10/22/20  1845 10/23/20  0505 10/24/20  0555   WBC 4.5 4.1 3.7   RBC 4.32 4.33 4.74   HGB 13.0 12.9 14.1   HCT 40.4 40.6 43.5   MCV 93.5 93.8 91.8   MCH 30.1 29.8 29.7   MCHC 32.2 31.8 32.4   RDW 14.7* 14.6* 14.2    168 192   MPV 10.2 10.3 10.2   INR  --  1.1  --    DDIMER  --  0.70*  --      Chemistry:  Recent Labs     10/23/20  0026 10/23/20  0505 10/24/20  0555    137 137   K 4.1 4.0 3.8    102 100   CO2 28 28 27   GLUCOSE 100* 91 98   BUN 10 8 10   CREATININE 0.58 0.52 0.62   ANIONGAP 8* 7* 10   LABGLOM >60 >60 >60   GFRAA >60 >60 >60   CALCIUM 8.9 8.8 9.2     Recent Labs     10/23/20  0026 10/23/20  0505 10/24/20  0555   PROT 6.2* 6.0* 6.5   LABALBU 3.6 3.5 3.8   AST 15 14 16   ALT 11 8 10     --   --    ALKPHOS 77 74 78   BILITOT 0.36 0.34 0.39     ABG:  Lab Results   Component Value Date    FIO2 NOT REPORTED 2016     Lab Results   Component Value Date/Time    SPECIAL LAC 10ML 10/22/2020 07:45 PM     Lab Results   Component Value Date/Time    CULTURE NO GROWTH 1 DAY 10/22/2020 07:45 PM       Radiology:  Chrissie Winters Abdomen (kub) (single Ap View)    Result Date: 10/23/2020  Ingested ring overlying the lower mediastinum superior to the diaphragm presumably in the distal esophagus.      Xr Chest Portable    Result Date: 10/22/2020  No acute cardiopulmonary process     Xr Chest 1 View    Result Date: 10/22/2020  Single lateral view again demonstrates the previously noted ring overlying the epigastric region and appears to be intrinsic, possibly in the stomach. Physical Examination:        Visual exam only:  Restless, non-labored respirations    Assessment:        Hospital Problems           Last Modified POA    * (Principal) Urinary tract infection without hematuria 10/23/2020 Yes    Drug allergy, multiple 10/23/2020 Yes    Essential hypertension 10/22/2020 Yes    Vascular dementia without behavioral disturbance ([facility] 10/22/2020 Yes    Dyslipidemia 10/22/2020 Yes    History of recurrent UTI (urinary tract infection) 10/22/2020 Yes    GERD (gastroesophageal reflux disease) 10/22/2020 Yes    COVID-19 virus infection 10/22/2020 Yes    Swallowed foreign body 10/23/2020 Yes    Paroxysmal atrial fibrillation ([address] 10/23/2020 Yes          Plan:        1. Cont antibiotics for uti, awaiting final cultures  2. No need for any covid treatment at this time  3. GI has no plans at this time to remove ring from esophagus since it does not seem to be bothering her and she is covid+  4. Dc serial labs  5.  Dc planning to [facility]    Ricky Landis DO  10/24/2020  9:26 AM

## 2020-10-25 LAB
ANION GAP SERPL CALCULATED.3IONS-SCNC: 9 MMOL/L (ref 9–17)
BUN BLDV-MCNC: 11 MG/DL (ref 8–23)
BUN/CREAT BLD: 15 (ref 9–20)
CALCIUM SERPL-MCNC: 9.1 MG/DL (ref 8.6–10.4)
CHLORIDE BLD-SCNC: 103 MMOL/L (ref 98–107)
CO2: 26 MMOL/L (ref 20–31)
CREAT SERPL-MCNC: 0.72 MG/DL (ref 0.5–0.9)
GFR AFRICAN AMERICAN: >60 ML/MIN
GFR NON-AFRICAN AMERICAN: >60 ML/MIN
GFR SERPL CREATININE-BSD FRML MDRD: NORMAL ML/MIN/{1.73_M2}
GFR SERPL CREATININE-BSD FRML MDRD: NORMAL ML/MIN/{1.73_M2}
GLUCOSE BLD-MCNC: 94 MG/DL (ref 70–99)
POTASSIUM SERPL-SCNC: 3.7 MMOL/L (ref 3.7–5.3)
SODIUM BLD-SCNC: 138 MMOL/L (ref 135–144)

## 2020-10-25 PROCEDURE — 6370000000 HC RX 637 (ALT 250 FOR IP): Performed by: INTERNAL MEDICINE

## 2020-10-25 PROCEDURE — 99232 SBSQ HOSP IP/OBS MODERATE 35: CPT | Performed by: INTERNAL MEDICINE

## 2020-10-25 PROCEDURE — 36415 COLL VENOUS BLD VENIPUNCTURE: CPT

## 2020-10-25 PROCEDURE — 2580000003 HC RX 258: Performed by: NURSE PRACTITIONER

## 2020-10-25 PROCEDURE — 1200000000 HC SEMI PRIVATE

## 2020-10-25 PROCEDURE — 80048 BASIC METABOLIC PNL TOTAL CA: CPT

## 2020-10-25 RX ADMIN — MELATONIN TAB 3 MG 9 MG: 3 TAB at 20:50

## 2020-10-25 RX ADMIN — QUETIAPINE FUMARATE 25 MG: 25 TABLET ORAL at 14:09

## 2020-10-25 RX ADMIN — DILTIAZEM HYDROCHLORIDE 30 MG: 30 TABLET, FILM COATED ORAL at 14:06

## 2020-10-25 RX ADMIN — APIXABAN 2.5 MG: 2.5 TABLET, FILM COATED ORAL at 14:08

## 2020-10-25 RX ADMIN — Medication 10 ML: at 20:56

## 2020-10-25 RX ADMIN — APIXABAN 2.5 MG: 2.5 TABLET, FILM COATED ORAL at 20:50

## 2020-10-25 RX ADMIN — QUETIAPINE FUMARATE 25 MG: 25 TABLET ORAL at 20:50

## 2020-10-25 NOTE — PLAN OF CARE
Acute:  Goal: Mental status will be restored to baseline  Description: Mental status will be restored to baseline  Outcome: Ongoing     Problem: Discharge Planning:  Goal: Ability to perform activities of daily living will improve  Description: Ability to perform activities of daily living will improve  Outcome: Ongoing     Problem: Injury - Risk of, Physical Injury:  Goal: Will remain free from falls  Description: Will remain free from falls  10/24/2020 2301 by Christy Brandon RN  Outcome: Ongoing  10/24/2020 1613 by Nghia Meng RN  Outcome: Ongoing     Problem: Injury - Risk of, Physical Injury:  Goal: Absence of physical injury  Description: Absence of physical injury  Outcome: Ongoing     Problem: Mood - Altered:  Goal: Mood stable  Description: Mood stable  Outcome: Ongoing     Problem: Mood - Altered:  Goal: Absence of abusive behavior  Description: Absence of abusive behavior  Outcome: Ongoing     Problem: Mood - Altered:  Goal: Verbalizations of feeling emotionally comfortable while being cared for will increase  Description: Verbalizations of feeling emotionally comfortable while being cared for will increase  Outcome: Ongoing     Problem: Psychomotor Activity - Altered:  Goal: Absence of psychomotor disturbance signs and symptoms  Description: Absence of psychomotor disturbance signs and symptoms  Outcome: Ongoing     Problem: Sensory Perception - Impaired:  Goal: Demonstrations of improved sensory functioning will increase  Description: Demonstrations of improved sensory functioning will increase  Outcome: Ongoing     Problem: Sensory Perception - Impaired:  Goal: Decrease in sensory misperception frequency  Description: Decrease in sensory misperception frequency  Outcome: Ongoing     Problem: Sensory Perception - Impaired:  Goal: Able to refrain from responding to false sensory perceptions  Description: Able to refrain from responding to false sensory perceptions  Outcome: Ongoing     Problem: Sensory Perception - Impaired:  Goal: Demonstrates accurate environmental perceptions  Description: Demonstrates accurate environmental perceptions  Outcome: Ongoing     Problem: Sensory Perception - Impaired:  Goal: Able to distinguish between reality-based and nonreality-based thinking  Description: Able to distinguish between reality-based and nonreality-based thinking  Outcome: Ongoing     Problem: Sensory Perception - Impaired:  Goal: Able to interrupt nonreality-based thinking  Description: Able to interrupt nonreality-based thinking  Outcome: Ongoing     Problem: Sleep Pattern Disturbance:  Goal: Appears well-rested  Description: Appears well-rested  Outcome: Ongoing

## 2020-10-25 NOTE — PROGRESS NOTES
[facility]  Office: [address], DO, Margie Armas, DO, Moriah Kc, DO, Hemanth Landis, DO, Sandra Shah MD, Verona Robison MD, Anne Russo MD, Merlyn Castorena MD, Aislinn Swanson MD, Keely Feliciano MD, Thais Vidales MD, Heena Ford MD, Ariel Crews MD, Kelli Navarrete DO, Benedict Son MD, Chiara Bailey MD, Seth Almeida DO, Luzma Stein MD,  Tash Walker, DO, Shabbir Silverio MD, Carly Benavides MD, Asad Peters [facility], [facility], [facility], Audra Wilson, CNP, Ermelinda Shipley, CNS, Michel Gusman, CNP, Raimundo oNyola, CNP, Wes Hutchison, CNP, Dionicia Frankel, [facility], Krys Contreras, CNP, Tata Mcnamara PA-C, Yousuf Moe, [facility], Kemal Roach, CNP, Jonnathan Ness, CNP, Hernando Uriostegui, CNP, Artur Bullock, CNP, Sam Ling, [facility]   [address]    Progress Note    10/25/2020    8:57 AM    Name:   Sara Sanders  MRN:     [id number]     [place]:      [de-identified]   Room:   [address] [place] [address] Day:  3  Admit Date:  10/22/2020  6:03 PM    PCP:   Ivory Parsons DO  Code Status:  Full Code    Subjective:     C/C:   Chief Complaint   Patient presents with    Fever     Interval History Status: not changed. Still refusing meds most of the time    Patient seen during the COVID-19 pandemic. In attempt to limit exposure and preserve PPE-- resources saved for most critical of patients. Writer observed patient outside of room. No physical examination performed. Brief History:     Per my ETHAN:  Sara Sanders is a 80 y.o. Non-/non  female who presents with Fever   and is admitted to the hospital for the management of Urinary tract infection without hematuria.     Patient is unable to provide information due to mental status, so information was obtained from the Emergency Department note. Patient was brought to the ED via private EMS for further evaluation of fever.   He of hypertension, dementia, dyslipidemia, GERD, recurrent UTIs, and previous CABG. she resides at [facility], where an employee tested positive for Covid. The facility was concerned for Covid due to her fever, so she was sent to the ED for evaluation. She is not hypoxic or requiring oxygen. Her Covid screening was positive. She was noted to be in atrial fibrillation, which she has no history of prior to today. Her urinalysis was positive for large leukocytes, moderate bacteria, and trace hemoglobin. Blood cultures were obtained by the emergency department. Chest x-ray does not note any acute cardiopulmonary process, but does note a ring overlying the epigastric region. She is being admitted for further evaluation and management of urinary tract infection. \"    Review of Systems:     Poor historian    Medications: Allergies:     Allergies   Allergen Reactions    Latex Itching    Actonel [Risedronate Sodium] Other (See Comments)     abdo pain      Adhesive Tape Other (See Comments)     band-aid  band-aid    Aminophylline Itching     Other reaction(s): Unknown    Amlodipine     Celebrex [Celecoxib] Itching     Other reaction(s): Unknown  Other reaction(s): Unknown    Chlorthalidone     Ciprofloxacin      Pain muscles      Codeine Nausea Only     Other reaction(s): Unknown  headache    Colesevelam Hcl     Darvocet A500 [Propoxyphene N-Acetaminophen] Nausea Only     headache    Demerol Hcl [Meperidine] Itching     Other reaction(s): Unknown  Flash backs- and did not help pain  Other reaction(s): Unknown  Flash backs- and did not help pain    Lisinopril     Lovastatin     Macrobid [Nitrofurantoin Monohyd Macro] Hives    Metoprolol     Nitrofuran Derivatives     Nitrofurantoin Hives     Other reaction(s): Unknown    Propoxyphene Nausea Only     headache    Ranitidine     Ranitidine Hcl      pain    Simvastatin     Tramadol Hcl      Other reaction(s): Unknown    Tramadol Hcl      Other reaction(s): Unknown    Trimethadione     Trimethoprim     Ultram [Tramadol] Itching     Other reaction(s): Unknown    Vioxx [Rofecoxib]     Naproxen Rash and Itching       rash    Sulfa Antibiotics Rash and Itching     Other reaction(s): Unknown       Current Meds:   Scheduled Meds:    apixaban  2.5 mg Oral BID    dilTIAZem  30 mg Oral 4 times per day    sodium chloride flush  10 mL Intravenous 2 times per day    cefTRIAXone (ROCEPHIN) IV  1 g Intravenous Q24H    melatonin  9 mg Oral Nightly    QUEtiapine  100 mg Oral Daily    QUEtiapine  25 mg Oral 4x Daily    sodium chloride  1,000 mL Intravenous Once     Continuous Infusions:    sodium chloride 75 mL/hr at 10/24/20 0403     PRN Meds: sodium chloride flush, acetaminophen **OR** acetaminophen, polyethylene glycol, promethazine **OR** ondansetron    Data:     Past Medical History:   has a past medical history of Abnormal cardiovascular stress test, Acute confusion, Acute coronary syndrome ([address] Acute right-sided CHF (congestive heart failure) ([address] Angina pectoris (HCC), Arthritis, Atrial fibrillation ([address] CAD (coronary artery disease), Calculus of kidney, CHF (congestive heart failure) ([address] Chronic cystitis, Chronic renal failure, stage 3 (moderate), Dementia ([address] Dyslipidemia, Essential hypertension, GERD (gastroesophageal reflux disease), GERD (gastroesophageal reflux disease), Hx of mitral valve repair, Hypertension, Hypokalemia, Neurogenic bladder, Osteoporosis, Pulmonary hypertension due to mitral valve disease ([address] Renal cyst, left, Renal insufficiency, Right HF (heart failure) secondary to left HF (heart failure) ([address] S/P CABG x 2, Stress incontinence of urine, Unstable angina ([address] Urge incontinence of urine, Urinary incontinence, UTI (urinary tract infection), and Vascular dementia without behavioral disturbance ([address] Social History:   reports that she has never smoked.  She has never used smokeless tobacco. She reports that she does not drink alcohol or use drugs. Family History:   Family History   Problem Relation Age of Onset    Diabetes Mother     Cancer Father     Anemia Father        Vitals:  BP (!) 153/64   Pulse 86   Temp 97.8 °F (36.6 °C) (Axillary)   Resp 16   Ht 5' 4\" (1.626 m)   Wt 100 lb (45.4 kg)   SpO2 100%   BMI 17.16 kg/m²   Temp (24hrs), Av.7 °F (36.5 °C), Min:97.3 °F (36.3 °C), Max:97.9 °F (36.6 °C)    No results for input(s): POCGLU in the last 72 hours. I/O (24Hr): Intake/Output Summary (Last 24 hours) at 10/25/2020 0857  Last data filed at 10/24/2020 1722  Gross per 24 hour   Intake 300 ml   Output --   Net 300 ml       Labs:  Hematology:  Recent Labs     10/22/20  1845 10/23/20  0505 10/24/20  0555   WBC 4.5 4.1 3.7   RBC 4.32 4.33 4.74   HGB 13.0 12.9 14.1   HCT 40.4 40.6 43.5   MCV 93.5 93.8 91.8   MCH 30.1 29.8 29.7   MCHC 32.2 31.8 32.4   RDW 14.7* 14.6* 14.2    168 192   MPV 10.2 10.3 10.2   INR  --  1.1  --    DDIMER  --  0.70*  --      Chemistry:  Recent Labs     10/23/20  0505 10/24/20  0555 10/25/20  0544    137 138   K 4.0 3.8 3.7    100 103   CO2 28 27 26   GLUCOSE 91 98 94   BUN 8 10 11   CREATININE 0.52 0.62 0.72   ANIONGAP 7* 10 9   LABGLOM >60 >60 >60   GFRAA >60 >60 >60   CALCIUM 8.8 9.2 9.1     Recent Labs     10/23/20  0026 10/23/20  0505 10/24/20  0555   PROT 6.2* 6.0* 6.5   LABALBU 3.6 3.5 3.8   AST 15 14 16   ALT 11 8 10     --   --    ALKPHOS 77 74 78   BILITOT 0.36 0.34 0.39     ABG:  Lab Results   Component Value Date    FIO2 NOT REPORTED 2016     Lab Results   Component Value Date/Time    SPECIAL LAC 10ML 10/22/2020 07:45 PM     Lab Results   Component Value Date/Time    CULTURE NO GROWTH 2 DAYS 10/22/2020 07:45 PM       Radiology:  Ralph Mirna Abdomen (kub) (single Ap View)    Result Date: 10/23/2020  Ingested ring overlying the lower mediastinum superior to the diaphragm presumably in the distal esophagus.      Xr Chest Portable    Result Date: 10/22/2020  No acute cardiopulmonary process     Xr Chest 1 View    Result Date: 10/22/2020  Single lateral view again demonstrates the previously noted ring overlying the epigastric region and appears to be intrinsic, possibly in the stomach. Physical Examination:        Visual exam only:  Calmer today   non-labored respirations    Assessment:        Hospital Problems           Last Modified POA    * (Principal) Urinary tract infection without hematuria 10/23/2020 Yes    Drug allergy, multiple 10/23/2020 Yes    Essential hypertension 10/22/2020 Yes    Vascular dementia without behavioral disturbance ([address] 10/22/2020 Yes    Dyslipidemia 10/22/2020 Yes    History of recurrent UTI (urinary tract infection) 10/22/2020 Yes    GERD (gastroesophageal reflux disease) 10/22/2020 Yes    COVID-19 virus infection 10/22/2020 Yes    Swallowed foreign body 10/23/2020 Yes    Paroxysmal atrial fibrillation ([address] 10/23/2020 Yes          Plan:        1. Dc antibiotics: urine culture negative and she has received 3 days rocephin  2. Dc ivf  3. No need for any covid treatment at this time  4. GI has no plans at this time to remove ring from esophagus since it does not seem to be bothering her and she is covid+  5.  Dc planning to snf when available, medically ready for dc    Ricky Landis DO  10/25/2020  8:57 AM

## 2020-10-25 NOTE — PLAN OF CARE
Problem: Falls - Risk of:  Goal: Will remain free from falls  Description: Will remain free from falls  Outcome: Ongoing     Pt remains confused, pt has dementia, telesitter in place, bed alarm on at all times, frequent rounds made, other treatments continue

## 2020-10-25 NOTE — PROGRESS NOTES
[facility]   Section of Cardiology  Progress Note      Date:  10/25/2020  Patient: Simeon Meehan  Admission:  10/22/2020  6:03 PM  Admit DX: UTI (urinary tract infection) [N39.0]  Age:  80 y. o., 1/12/1929                           LOS: 3 days     Reason for evaluation:   atrial fibrillation  Covid 19 pneumonia. SUBJECTIVE:     The patient was seen and examined. Notes and labs reviewed. There were not complications over night. She converted to sinus rhythm overnight. OBJECTIVE:    BP (!) 154/64   Pulse 96   Temp 97.3 °F (36.3 °C) (Axillary)   Resp 16   Ht 5' 4\" (1.626 m)   Wt 100 lb (45.4 kg)   SpO2 100%   BMI 17.16 kg/m²     Intake/Output Summary (Last 24 hours) at 10/25/2020 1519  Last data filed at 10/24/2020 1722  Gross per 24 hour   Intake 300 ml   Output --   Net 300 ml       EXAM:   Unable to do due to corona virus pneumonia. Current Inpatient Medications:   apixaban  2.5 mg Oral BID    dilTIAZem  30 mg Oral 4 times per day    sodium chloride flush  10 mL Intravenous 2 times per day    melatonin  9 mg Oral Nightly    QUEtiapine  100 mg Oral Daily    QUEtiapine  25 mg Oral 4x Daily    sodium chloride  1,000 mL Intravenous Once       IV Infusions (if any):      Diagnostics:   Telemetry. Normal sinus rhythm  Labs:   CBC:   Recent Labs     10/23/20  0505 10/24/20  0555   WBC 4.1 3.7   HGB 12.9 14.1   HCT 40.6 43.5    192     BMP:   Recent Labs     10/24/20  0555 10/25/20  0544    138   K 3.8 3.7   CO2 27 26   BUN 10 11   CREATININE 0.62 0.72   LABGLOM >60 >60   GLUCOSE 98 94     BNP: No results for input(s): BNP in the last 72 hours. PT/INR:   Recent Labs     10/23/20  0505   PROTIME 13.5   INR 1.1     APTT:  Recent Labs     10/23/20  0505   APTT 42.6*     CARDIAC ENZYMES:No results for input(s): CKTOTAL, CKMB, CKMBINDEX, TROPONINI in the last 72 hours.   FASTING LIPID PANEL:  Lab Results   Component Value Date    HDL 43 06/17/2019    TRIG 140 06/17/2019 LIVER PROFILE:  Recent Labs     10/23/20  0505 10/24/20  0555   AST 14 16   ALT 8 10   LABALBU 3.5 3.8       ASSESSMENT:    Patient Active Problem List   Diagnosis    Renal insufficiency    Urinary incontinence    Osteoporosis    Abnormal cardiovascular stress test    Pulmonary hypertension due to mitral valve disease ([address]    Hx of mitral valve repair    S/P CABG x 2    Right HF (heart failure) secondary to left HF ([facility]) ([facility]    Acute right-sided CHF (congestive heart failure) ([facility])    Acute coronary syndrome ([address]    Drug allergy, multiple    Unstable angina ([address]    Essential hypertension    Vascular dementia without behavioral disturbance ([address]    Dyslipidemia    Urinary bladder disorder    History of recurrent UTI (urinary tract infection)    Angina pectoris ([facility])    SOB (shortness of breath) on exertion    Dizziness    GERD (gastroesophageal reflux disease)    Chronic renal failure, stage 3 (moderate)    Weakness    Hypokalemia    Acute confusion    Chronic cystitis    Hydronephrosis, right    Neurogenic bladder    Hypertension    Osteoporosis    Acquired cystic kidney disease    Calculus of kidney    Renal cyst, left    Stress incontinence of urine    Urge incontinence of urine    Recurrent UTI    Urinary retention with incomplete bladder emptying    COVID-19 virus infection    Urinary tract infection without hematuria    Swallowed foreign body    Paroxysmal atrial fibrillation (HCC)       PLAN:    1. Paroxysmal atrial fibrillation  2. Hypertension  3. CKD  Plan  Patient is in normal sinus rhythm. Discontinue diltiazem. Continue eliquis for stroke prophylaxis. Please see orders. Discussed with  nursing.     Staci Sandifer, MD

## 2020-10-25 NOTE — PROGRESS NOTES
Pt. Has refused medication. Medication was offered numerous attempts. Will continue to offer medication.

## 2020-10-25 NOTE — PROGRESS NOTES
Pt resting in bed without distress, pt remains confused, pt would not take am medications and refused to eat in the morning, pt did take 1200 pills crushed with pudding now and pt drank gingerale and water, will continue to monitor

## 2020-10-26 PROCEDURE — 2580000003 HC RX 258: Performed by: NURSE PRACTITIONER

## 2020-10-26 PROCEDURE — 99231 SBSQ HOSP IP/OBS SF/LOW 25: CPT | Performed by: INTERNAL MEDICINE

## 2020-10-26 PROCEDURE — 97166 OT EVAL MOD COMPLEX 45 MIN: CPT

## 2020-10-26 PROCEDURE — 6370000000 HC RX 637 (ALT 250 FOR IP): Performed by: INTERNAL MEDICINE

## 2020-10-26 PROCEDURE — 97535 SELF CARE MNGMENT TRAINING: CPT

## 2020-10-26 PROCEDURE — 1200000000 HC SEMI PRIVATE

## 2020-10-26 PROCEDURE — 2500000003 HC RX 250 WO HCPCS: Performed by: NURSE PRACTITIONER

## 2020-10-26 PROCEDURE — 97164 PT RE-EVAL EST PLAN CARE: CPT

## 2020-10-26 PROCEDURE — 97530 THERAPEUTIC ACTIVITIES: CPT

## 2020-10-26 RX ORDER — DILTIAZEM HYDROCHLORIDE 5 MG/ML
5 INJECTION INTRAVENOUS ONCE
Status: COMPLETED | OUTPATIENT
Start: 2020-10-26 | End: 2020-10-26

## 2020-10-26 RX ADMIN — Medication 10 ML: at 08:27

## 2020-10-26 RX ADMIN — Medication 10 ML: at 21:21

## 2020-10-26 RX ADMIN — DILTIAZEM HYDROCHLORIDE 5 MG: 5 INJECTION INTRAVENOUS at 21:21

## 2020-10-26 ASSESSMENT — PAIN SCALES - PAIN ASSESSMENT IN ADVANCED DEMENTIA (PAINAD)
TOTALSCORE: 0
FACIALEXPRESSION: 0
NEGVOCALIZATION: 0
CONSOLABILITY: 0
FACIALEXPRESSION: 0
FACIALEXPRESSION: 0
BREATHING: 0
NEGVOCALIZATION: 0
CONSOLABILITY: 0
CONSOLABILITY: 0
BREATHING: 0
BODYLANGUAGE: 0
NEGVOCALIZATION: 0
BREATHING: 0
BREATHING: 0
CONSOLABILITY: 0
BODYLANGUAGE: 0
BREATHING: 0
TOTALSCORE: 0
NEGVOCALIZATION: 0
BODYLANGUAGE: 1
CONSOLABILITY: 0
CONSOLABILITY: 0
NEGVOCALIZATION: 0
BODYLANGUAGE: 0
CONSOLABILITY: 0
BREATHING: 0
CONSOLABILITY: 0
CONSOLABILITY: 0
FACIALEXPRESSION: 0
FACIALEXPRESSION: 0
TOTALSCORE: 0
CONSOLABILITY: 0
TOTALSCORE: 0
FACIALEXPRESSION: 0
BODYLANGUAGE: 1
TOTALSCORE: 0
BODYLANGUAGE: 0
NEGVOCALIZATION: 0
NEGVOCALIZATION: 0
BREATHING: 0
BODYLANGUAGE: 0
BODYLANGUAGE: 1
BREATHING: 0
CONSOLABILITY: 0
NEGVOCALIZATION: 0
BODYLANGUAGE: 0
BODYLANGUAGE: 1
TOTALSCORE: 0
BREATHING: 0
BREATHING: 0
TOTALSCORE: 0
BODYLANGUAGE: 0
FACIALEXPRESSION: 0
BREATHING: 0

## 2020-10-26 NOTE — PLAN OF CARE
D/w son and daughter-in-law: explained another covid test not indicated as we have to assume her fever was covid so regardless of 2nd covid test she still needs quarantine    Rossana Landsi, DO

## 2020-10-26 NOTE — CARE COORDINATION
Social work: Phone call to [employer], they are not accepting COVID positive patients.  left for admissions/[facility] to inquire on admission.

## 2020-10-26 NOTE — PROGRESS NOTES
Occupational Therapy   Occupational Therapy Initial Assessment  Date: 10/26/2020   Patient Name: Lina Dsouza  MRN: [id number]     : 1929    Date of Service: 10/26/2020    Discharge Recommendations:  [address] RN reports patient is medically stable for therapy treatment this date. Chart reviewed prior to treatment and patient is agreeable for therapy. All lines intact and patient positioned comfortably at end of treatment. All patient needs addressed prior to ending therapy session. Assessment   Performance deficits / Impairments: Decreased functional mobility ; Decreased ADL status; Decreased strength;Decreased safe awareness;Decreased cognition;Decreased endurance;Decreased balance;Decreased posture  Prognosis: Fair  Decision Making: High Complexity  OT Education: OT Role;Plan of Care;Precautions; ADL Adaptive Strategies;Transfer Training;Energy Conservation;Equipment; Family Education;Orientation  REQUIRES OT FOLLOW UP: Yes  Activity Tolerance  Activity Tolerance: Treatment limited secondary to decreased cognition;Patient Tolerated treatment well  Safety Devices  Safety Devices in place: Yes  Type of devices: Call light within reach;Nurse notified; Left in bed;Bed alarm in place; Patient at risk for falls;Gait belt           Patient Diagnosis(es): The primary encounter diagnosis was Urinary tract infection without hematuria, site unspecified. Diagnoses of COVID-19 and Swallowed foreign body, initial encounter were also pertinent to this visit.      has a past medical history of Abnormal cardiovascular stress test, Acute confusion, Acute coronary syndrome (HCC), Acute right-sided CHF (congestive heart failure) ([address] Angina pectoris (HCC), Arthritis, Atrial fibrillation ([address] CAD (coronary artery disease), Calculus of kidney, CHF (congestive heart failure) ([address] Chronic cystitis, Chronic renal failure, stage 3 (moderate), Dementia ([address] Dyslipidemia, Essential hypertension, GERD (gastroesophageal reflux disease), GERD (gastroesophageal reflux disease), Hx of mitral valve repair, Hypertension, Hypokalemia, Neurogenic bladder, Osteoporosis, Pulmonary hypertension due to mitral valve disease ([facility] Renal cyst, left, Renal insufficiency, Right HF (heart failure) secondary to left HF (heart failure) ([facility] S/P CABG x 2, Stress incontinence of urine, Unstable angina ([facility] Urge incontinence of urine, Urinary incontinence, UTI (urinary tract infection), and Vascular dementia without behavioral disturbance ([facility] has a past surgical history that includes Hysterectomy; Dilation and curettage of uterus; Appendectomy; hernia repair; Eye surgery (Right); Coronary artery bypass graft (N/A, 2003); and Cardiac catheterization (4-). Restrictions  Restrictions/Precautions  Restrictions/Precautions: Up as Tolerated, Fall Risk, Isolation, General Precautions  Position Activity Restriction  Other position/activity restrictions: Droplet Plus Isolation for COVID-19, up with 2 assist, Heels off bed if unable to move LE's, turn/assist Q 2 hrs if unable to turn self    Subjective   General  Chart Reviewed: Yes  Patient assessed for rehabilitation services?: Yes  Additional Pertinent Hx: Dementia  Family / Caregiver Present: No  Patient Currently in Pain: Denies  Vital Signs  Patient Currently in Pain: Denies  Social/Functional History  Social/Functional History  Lives With: Alone([facility] unit.   Unit unable to take patient back.)  Type of Home: Assisted living(Memory care at [facility]  Home Layout: One level  Home Access: Level entry  Home Equipment: Rolling walker, [facility]  ADL Assistance: Needs assistance  Homemaking Assistance: Needs assistance  Homemaking Responsibilities: No  Ambulation Assistance: (only using W/C)  Transfer Assistance: Needs assistance  Active : No  Additional Comments: Pt unable to provide information due to dementia, info obtqained from case management notes. Pt has been resident of [address] [place] [address] since 9/2020, pt is w/c bound and able to pivot for transfers, requires assist to feed at times, and able to communicate needs to toilet       Objective   Vision: (unable to fully assess)  Hearing: Exceptions to [facility]  Hearing Exceptions: Hard of hearing/hearing concerns    Orientation  Overall Orientation Status: Impaired  Orientation Level: Oriented to person;Disoriented to time;Disoriented to situation;Disoriented to place  Observation/Palpation  Posture: Fair  Observation: Frail. 79 y/o. Pt is rigid somewhat, fearful of falling in sitting/standing. Balance  Sitting Balance: Moderate assistance(pt sat EOB X 15 min with mod A to keep wt forward over RAJINDER. Pt leaning posteriorly throughout. Pt is also fearful of falling which hinders balance.)  Standing Balance: Maximum assistance(x2 in [facility]. Pt having difficulty achieving full stand. Needing max A 2 to bring hips forward enough to bring seat flaps down on device. Pt is fearful and resistive somewhat d/t dementia)  Functional Mobility  Functional Mobility Comments: [facility] to move pt up higher in bed. ADL  Feeding: Moderate assistance(pt needing mod A for self feeding breakfast. Max A to load fork and min-mod A to bring to mouth. Pt has difficulty with initiation of task as well as needing cues to follow through)  Grooming:  Moderate assistance;Maximum assistance  UE Bathing: Maximum assistance  LE Bathing: Maximum assistance;Dependent/Total  UE Dressing: Maximum assistance  LE Dressing: Maximum assistance;Dependent/Total  Toileting: Dependent/Total  Additional Comments: pt is limited by cognition, dec. standing tolerance, dec. sitting balance  Tone RUE  RUE Tone: Normotonic  Tone LUE  LUE Tone: Normotonic     Bed mobility  Bridging: Maximum assistance  Rolling to Left: Maximum assistance;2 Person assistance  Supine to Sit: Maximum assistance;2 Person assistance  Scooting: Maximal assistance;2 Person assistance  Comment: max verbal instruction/tactile assist for UE hand placement on rail and proper rolling tech+support of LEs to come in/OOB with inc. time to complete  Transfers  Sit to stand: 2 Person assistance;Maximum assistance  Stand to sit: 2 Person assistance;Maximum assistance  Transfer Comments: in [facility]  Overall Cognitive Status: Exceptions  Arousal/Alertness: Inconsistent responses to stimuli  Following Commands: Inconsistently follows commands  Attention Span: Difficulty attending to directions  Safety Judgement: Decreased awareness of need for assistance;Decreased awareness of need for safety  Problem Solving: Decreased awareness of errors;Assistance required to identify errors made;Assistance required to correct errors made;Assistance required to implement solutions  Insights: Not aware of deficits  Initiation: Requires cues for all  Sequencing: Requires cues for all  Perception  Overall Perceptual Status: Impaired  Initiation: Hand over hand to initiate tasks  Motor Planning: Cues to use objects appropriately     Sensation  Overall Sensation Status: WFL        LUE PROM (degrees)  LUE PROM: WFL  LUE General PROM: Pt is resistive somewhat to ROM.  Does not follow directions well d/t dementia  RUE PROM (degrees)  RUE PROM: WFL  LUE Strength  LUE Strength Comment: Unable to follow directions for MMT                   Plan   Plan  Times per week: 4-5x/week, 1-2x/day  Current Treatment Recommendations: Strengthening, Functional Mobility Training, Balance Training, Endurance Training, Safety Education & Training, Self-Care / ADL, Equipment Evaluation, Education, & procurement, Patient/Caregiver Education & Training, Positioning, Cognitive Reorientation, Cognitive/Perceptual Training         Goals  Short term goals  Time Frame for Short term goals: by discharge, pt will  Short term goal 1: demo mod A x1 for ADL transfers with approp AD  Short term goal 2: demo min A for self feeding following set up with min cues for initiation/follow through on tasks  Short term goal 3: demo min A with bed mob with min cues for tech  Short term goal 4: demo SBA with sitting balance x 15 min at EOB for inc. ADL completion/prep for transfers  Short term goal 5: participate in AAROM ex for B UEs to inc/maintain strength for transfers/ADLs  Patient Goals   Patient goals : not stated       Therapy Time   Individual Concurrent Group Co-treatment   Time In 0934         Time Out 1010         Minutes 36               Patient would benefit from SNF for continued occupational therapy to increase independence with  ADL of bathing, dressing, toileting and grooming. Writer recommending SNF placement for for activity tolerance and strength which will increase independence with ADL's coordinated with bed mobility and chair transfers. Continued skilled OT services to address decreased safety awareness with ADL and IADL tasks and for education and increased independence with DME and AE for fall prevention and ec/ws techniques prior to d/c home. Co-treatment with PT warranted secondary to decreased safety and independence requiring 2 skilled therapy professionals to address individual discipline's goals. OT addressing preparation for ADL transfer, sitting balance for increased ADL performance, sitting/activity tolerance, functional reaching, environmental safety/scanning, fall prevention, functional mobility for ADL transfers, ability to sequence and follow directions and functional UE strength.   Sol Pretty, OT

## 2020-10-26 NOTE — CARE COORDINATION
Social work: Phone call to Ronald Mora and Silvia/son and DIL to provide update regarding snf placement. Await call back from [facility] regarding acceptance. Julia Randall gave another choice of [facility] referral faxed. Family also requesting traditional COVID test, as patient has not been exhibiting symptoms of covid. Writer relayed request to bedside RN.

## 2020-10-26 NOTE — CARE COORDINATION
Social work: phone call from [facility]/neeru weldon, they are not accepting COVID positive patients at this time. Await call backs from [facility], [facility] and [facility].

## 2020-10-26 NOTE — PROGRESS NOTES
[facility]  Office: Mario Jose Roberto, DO, Miraclifford Mancia, DO, Alessandro Meryl, DO, Lucina Meza Blood, DO, Sammy Prabhakar MD, Rupinder Diggs MD, Joseph Mckeon MD, Vianney Rooney MD, Jon Saucedo MD, Beryle Marseille, MD, Ciro Pallas, MD, Jam Blair MD, Ariel Carrera MD, Sivakumar Salmon, DO, Socorro Rice MD, Jacqueline Garner MD, Debbie Eng DO, Noah Hammond MD,  Precious Damon DO, Audrey Boone MD, Ana Benjamin MD, Destiny Fisher, [facility], [facility], [facility], Pipo Arteaga, CNP, Jean Pierre Sarmiento, CNS, Fan Paulino, CNP, Shanelle Wood, CNP, Macy Stiles, CNP, Jonathan Hopkins, CNP, Placido Steven, CNP, Alecia Paiz PA-C, Mortimer Guardian, [facility], Francois Diaz, CNP, Lavonne Frankel, CNP, Alexis Diaz, CNP, León Lees, CNP, Lucas Zacarias [facility]    Progress Note    10/26/2020    9:06 AM    Name:   Everardo Carmichael  MRN:     [id number]     [place]:      [de-identified]   Room:   [address] [place] [address] Day:  4  Admit Date:  10/22/2020  6:03 PM    PCP:   Nancy Gonzalez DO  Code Status:  Full Code    Subjective:     C/C:   Chief Complaint   Patient presents with    Fever     Interval History Status: not changed. Still refusing meds most of the time    Patient seen during the COVID-19 pandemic. In attempt to limit exposure and preserve PPE-- resources saved for most critical of patients. Writer observed patient outside of room. No physical examination performed. Brief History:     Per my ETHAN:  Everardo Carmichael is a 80 y.o. Non-/non  female who presents with Fever   and is admitted to the hospital for the management of Urinary tract infection without hematuria.     Patient is unable to provide information due to mental status, so information was obtained from the Emergency Department note. Patient was brought to the ED via private EMS for further evaluation of fever.   He of hypertension, dementia, dyslipidemia, GERD, recurrent UTIs, and previous CABG. she resides at [email or web address], where an employee tested positive for Covid. The facility was concerned for Covid due to her fever, so she was sent to the ED for evaluation. She is not hypoxic or requiring oxygen. Her Covid screening was positive. She was noted to be in atrial fibrillation, which she has no history of prior to today. Her urinalysis was positive for large leukocytes, moderate bacteria, and trace hemoglobin. Blood cultures were obtained by the emergency department. Chest x-ray does not note any acute cardiopulmonary process, but does note a ring overlying the epigastric region. She is being admitted for further evaluation and management of urinary tract infection. \"    Review of Systems:     Poor historian    Medications: Allergies:     Allergies   Allergen Reactions    Latex Itching    Actonel [Risedronate Sodium] Other (See Comments)     abdo pain      Adhesive Tape Other (See Comments)     band-aid  band-aid    Aminophylline Itching     Other reaction(s): Unknown    Amlodipine     Celebrex [Celecoxib] Itching     Other reaction(s): Unknown  Other reaction(s): Unknown    Chlorthalidone     Ciprofloxacin      Pain muscles      Codeine Nausea Only     Other reaction(s): Unknown  headache    Colesevelam Hcl     Darvocet A500 [Propoxyphene N-Acetaminophen] Nausea Only     headache    Demerol Hcl [Meperidine] Itching     Other reaction(s): Unknown  Flash backs- and did not help pain  Other reaction(s): Unknown  Flash backs- and did not help pain    Lisinopril     Lovastatin     Macrobid [Nitrofurantoin Monohyd Macro] Hives    Metoprolol     Nitrofuran Derivatives     Nitrofurantoin Hives     Other reaction(s): Unknown    Propoxyphene Nausea Only     headache    Ranitidine     Ranitidine Hcl      pain    Simvastatin     Tramadol Hcl      Other reaction(s): Unknown    Tramadol Hcl      Other reaction(s): Unknown    Trimethadione     Trimethoprim     Ultram [Tramadol] Itching     Other reaction(s): Unknown    Vioxx [Rofecoxib]     Naproxen Rash and Itching       rash    Sulfa Antibiotics Rash and Itching     Other reaction(s): Unknown       Current Meds:   Scheduled Meds:    apixaban  2.5 mg Oral BID    dilTIAZem  30 mg Oral 4 times per day    sodium chloride flush  10 mL Intravenous 2 times per day    melatonin  9 mg Oral Nightly    QUEtiapine  100 mg Oral Daily    QUEtiapine  25 mg Oral 4x Daily    sodium chloride  1,000 mL Intravenous Once     Continuous Infusions:     PRN Meds: sodium chloride flush, acetaminophen **OR** acetaminophen, polyethylene glycol, promethazine **OR** ondansetron    Data:     Past Medical History:   has a past medical history of Abnormal cardiovascular stress test, Acute confusion, Acute coronary syndrome ([facility]), Acute right-sided CHF (congestive heart failure) ([facility]), Angina pectoris ([facility]), Arthritis, Atrial fibrillation ([facility]), CAD (coronary artery disease), Calculus of kidney, CHF (congestive heart failure) ([address] Chronic cystitis, Chronic renal failure, stage 3 (moderate), Dementia ([address] Dyslipidemia, Essential hypertension, GERD (gastroesophageal reflux disease), GERD (gastroesophageal reflux disease), Hx of mitral valve repair, Hypertension, Hypokalemia, Neurogenic bladder, Osteoporosis, Pulmonary hypertension due to mitral valve disease ([address] Renal cyst, left, Renal insufficiency, Right HF (heart failure) secondary to left HF (heart failure) ([address] S/P CABG x 2, Stress incontinence of urine, Unstable angina ([facility]), Urge incontinence of urine, Urinary incontinence, UTI (urinary tract infection), and Vascular dementia without behavioral disturbance ([address] Social History:   reports that she has never smoked. She has never used smokeless tobacco. She reports that she does not drink alcohol or use drugs.      Family History:   Family History   Problem Relation Age of Onset  Diabetes Mother     Cancer Father     Anemia Father        Vitals:  BP (!) 114/52   Pulse 94   Temp 97.6 °F (36.4 °C) (Axillary)   Resp 17   Ht 5' 4\" (1.626 m)   Wt 85 lb 8 oz (38.8 kg)   SpO2 97%   BMI 14.68 kg/m²   Temp (24hrs), Av.8 °F (36.6 °C), Min:97.3 °F (36.3 °C), Max:98.4 °F (36.9 °C)    No results for input(s): POCGLU in the last 72 hours. I/O (24Hr): Intake/Output Summary (Last 24 hours) at 10/26/2020 09  Last data filed at 10/26/2020 0442  Gross per 24 hour   Intake 200 ml   Output --   Net 200 ml       Labs:  Hematology:  Recent Labs     10/24/20  0555   WBC 3.7   RBC 4.74   HGB 14.1   HCT 43.5   MCV 91.8   MCH 29.7   MCHC 32.4   RDW 14.2      MPV 10.2     Chemistry:  Recent Labs     10/24/20  0555 10/25/20  0544    138   K 3.8 3.7    103   CO2 27 26   GLUCOSE 98 94   BUN 10 11   CREATININE 0.62 0.72   ANIONGAP 10 9   LABGLOM >60 >60   GFRAA >60 >60   CALCIUM 9.2 9.1     Recent Labs     10/24/20  0555   PROT 6.5   LABALBU 3.8   AST 16   ALT 10   ALKPHOS 78   BILITOT 0.39     ABG:  Lab Results   Component Value Date    FIO2 NOT REPORTED 2016     Lab Results   Component Value Date/Time    SPECIAL LAC 10ML 10/22/2020 07:45 PM     Lab Results   Component Value Date/Time    CULTURE NO GROWTH 2 DAYS 10/22/2020 07:45 PM       Radiology:  Jewelene Feller Abdomen (kub) (single Ap View)    Result Date: 10/23/2020  Ingested ring overlying the lower mediastinum superior to the diaphragm presumably in the distal esophagus. Xr Chest Portable    Result Date: 10/22/2020  No acute cardiopulmonary process     Xr Chest 1 View    Result Date: 10/22/2020  Single lateral view again demonstrates the previously noted ring overlying the epigastric region and appears to be intrinsic, possibly in the stomach.        Physical Examination:        Visual exam only:  Calmer today   non-labored respirations    Assessment:        Hospital Problems           Last Modified POA    * (Principal) Urinary tract infection without hematuria 10/23/2020 Yes    Drug allergy, multiple 10/23/2020 Yes    Essential hypertension 10/22/2020 Yes    Vascular dementia without behavioral disturbance ([facility] 10/22/2020 Yes    Dyslipidemia 10/22/2020 Yes    History of recurrent UTI (urinary tract infection) 10/22/2020 Yes    GERD (gastroesophageal reflux disease) 10/22/2020 Yes    COVID-19 virus infection 10/22/2020 Yes    Swallowed foreign body 10/23/2020 Yes    Paroxysmal atrial fibrillation ([facility] 10/23/2020 Yes          Plan:        1. Received full course of antibiotics for suspected uti (though culture negative)-given negative culture, have to assume her fever was sec to covid  2. No need for any covid treatment at this time  3. GI has no plans at this time to remove ring from esophagus since it does not seem to be bothering her and she is covid+  4. Dc planning to snf when available, medically ready for dc  5.  Spoke to Dr Corina Pelaez requests we see her until discharge    Mohit Landis DO  10/26/2020  9:06 AM

## 2020-10-26 NOTE — PROGRESS NOTES
Physical Therapy    Facility/Department: [facility]  Re-Assessment    NAME: Chris Pandey  : 1929  MRN: [id number]    Date of Service: 10/26/2020    Discharge Recommendations:  Subacute/Skilled Nursing Facility        Assessment   Body structures, Functions, Activity limitations: Decreased functional mobility ; Decreased ROM; Decreased strength;Decreased safe awareness;Decreased cognition;Decreased balance;Decreased endurance;Decreased ADL status; Decreased posture  Assessment: Pt with max deficits of bed mobility, transfers, balance, cognition, safety awareness and endurance this session,  & required 2 assist for ALL functional mobility & needed Versie Constable for transfers. Pt HIGH risk for falls & recommend SNF at D/C to return to baseline function and a safe D/C back to Asst living. Pt requires continued PT to maximize independence with functional mobility, balance, safety awareness & activity tolerance. Specific instructions for Next Treatment: progress mobility as toleraated  Prognosis: Fair  Decision Making: High Complexity  Exam: ROM, MMT, functional mobility, activity tolerance, Balance, & [employer] AM-PAC 6 Clicks Basic Mobility  Clinical Presentation: evolving  PT Education: PT Role;Plan of Care;Transfer Training;Functional Mobility Training;Orientation  REQUIRES PT FOLLOW UP: Yes  Activity Tolerance  Activity Tolerance: Patient limited by fatigue;Patient limited by endurance; Patient limited by cognitive status       Patient Diagnosis(es): The primary encounter diagnosis was Urinary tract infection without hematuria, site unspecified. Diagnoses of COVID-19 and Swallowed foreign body, initial encounter were also pertinent to this visit.      has a past medical history of Abnormal cardiovascular stress test, Acute confusion, Acute coronary syndrome (HCC), Acute right-sided CHF (congestive heart failure) ([address] Angina pectoris ([address] Arthritis, Atrial fibrillation ([address] CAD (coronary artery disease), Calculus of kidney, CHF (congestive heart failure) ([address] Chronic cystitis, Chronic renal failure, stage 3 (moderate), Dementia ([address] Dyslipidemia, Essential hypertension, GERD (gastroesophageal reflux disease), GERD (gastroesophageal reflux disease), Hx of mitral valve repair, Hypertension, Hypokalemia, Neurogenic bladder, Osteoporosis, Pulmonary hypertension due to mitral valve disease ([address] Renal cyst, left, Renal insufficiency, Right HF (heart failure) secondary to left HF (heart failure) ([address] S/P CABG x 2, Stress incontinence of urine, Unstable angina ([address] Urge incontinence of urine, Urinary incontinence, UTI (urinary tract infection), and Vascular dementia without behavioral disturbance ([address] has a past surgical history that includes Hysterectomy; Dilation and curettage of uterus; Appendectomy; hernia repair; Eye surgery (Right); Coronary artery bypass graft (N/A, 2003); and Cardiac catheterization (4-). Restrictions  Restrictions/Precautions  Restrictions/Precautions: Up as Tolerated, Fall Risk, Isolation, General Precautions  Position Activity Restriction  Other position/activity restrictions: Droplet Plus Isolation for COVID-19, up with 2 assist, Heels off bed if unable to move LE's, turn/assist Q 2 hrs if unable to turn self  Vision/Hearing  Vision: (unable to fully assess)  Hearing: Exceptions to [facility]  Hearing Exceptions: Hard of hearing/hearing concerns     Subjective  General  Chart Reviewed: Yes  Patient assessed for rehabilitation services?: Yes  Response To Previous Treatment: Patient with no complaints from previous session.   Family / Caregiver Present: No  General Comment  Comments: RN okays PT  Subjective  Subjective: Pt agreeable to PT  Pain Screening  Patient Currently in Pain: Denies  Vital Signs  BP Location: Left Arm  Oxygen Therapy  O2 Device: None (Room air)       Orientation  Orientation  Overall Orientation Status: Impaired  Orientation Level: Normotonic     Bed mobility  Bridging: Maximum assistance  Rolling to Left: Maximum assistance;2 Person assistance  Supine to Sit: Maximum assistance;2 Person assistance  Scooting: Maximal assistance;2 Person assistance  Comment: Max verbal instruction/tactile assist for UE hand placement on rail and proper rolling technique + support of LE's to come in/OOB with increased time needed. Transfers  Sit to Stand: Maximum Assistance;2 Person Assistance(via Luis Holcombe)  Stand to sit: Maximum Assistance;2 Person Assistance(via Luis Holcombe)  Lateral Transfers: Dependent/Total(via Luis Holcombe)  Comment: Ed + tactile assist on correct use of upper body for safe sit/stand, required Ashwini Funes(placed Luis Holcombe, needed assist to place feet onto platform, stood into device with 2 max assist, mobilized to [facility] & sat from device with 2max assist)  Ambulation  Ambulation?: No     Balance  Sitting - Static: Fair;-  Sitting - Dynamic: Poor  Standing - Static: (Max of 2)  Standing - Dynamic: (dependent)     All lines intact, call light within reach, and patient positioned comfortably at end of treatment. All patient needs addressed prior to ending therapy session. Plan   Plan  Times per week: 1-2x/day. 5x/week. Specific instructions for Next Treatment: progress mobility as toleraated  Current Treatment Recommendations: Strengthening, Home Exercise Program, Neuromuscular Re-education, ROM, Cognitive/Perceptual Training, Safety Education & Training, Patient/Caregiver Education & Training, Endurance Training, Balance Training, Functional Mobility Training, Transfer Training, Gait Training, Positioning  Safety Devices  Type of devices:  All fall risk precautions in place, Call light within reach, Bed alarm in place, Left in bed    G-Code       OutComes Score                                                  AM-PAC Score             Goals  Short term goals  Time Frame for Short term goals: 12 visits  Short term goal 1: Pt able to complete bed mobility with CGA; Short term goal 2: Pt able to transfer bed to chair with Jean Claude with safest device; Short term goal 3: Pt to tolerate sitting EOB up to 30 minutes with UB/LB support to improve core stability & repositioning for pressure relief  Short term goal 4: Pt able to tolerate 30 min of activity to include 15-20 reps of ex & functional mobility  to facilitate better activity tolerance; Short term goal 5: Develop stretching/ maintenance program for next care giver to ensure ROM for safe hygiene. Patient Goals   Patient goals : Unable to verbalize       Therapy Time   Individual Concurrent Group Co-treatment   Time In 0933         Time Out 200         Minutes 29              Co-treatment with OT warranted secondary to decreased safety and independence requiring 2 skilled therapy professionals to address individual discipline's goals. PT addressing pre gait trunk strengthening, weight shifting prior to transfers, transfer training and postural control in sitting.       [address], PT

## 2020-10-26 NOTE — PLAN OF CARE
Problem: Airway Clearance - Ineffective  Goal: Achieve or maintain patent airway  Outcome: Ongoing     Problem: Gas Exchange - Impaired  Goal: Absence of hypoxia  Outcome: Ongoing  Goal: Promote optimal lung function  Outcome: Ongoing     Problem: Breathing Pattern - Ineffective  Goal: Ability to achieve and maintain a regular respiratory rate  Outcome: Ongoing     Problem: Body Temperature -  Risk of, Imbalanced  Goal: Ability to maintain a body temperature within defined limits  Outcome: Ongoing  Goal: Will regain or maintain usual level of consciousness  Outcome: Ongoing  Goal: Complications related to the disease process, condition or treatment will be avoided or minimized  Outcome: Ongoing     Problem: Isolation Precautions - Risk of Spread of Infection  Goal: Prevent transmission of infection  Outcome: Ongoing     Problem: Nutrition Deficits  Goal: Optimize nutrtional status  Outcome: Ongoing     Problem: Risk for Fluid Volume Deficit  Goal: Maintain normal heart rhythm  Outcome: Ongoing  Goal: Maintain absence of muscle cramping  Outcome: Ongoing  Goal: Maintain normal serum potassium, sodium, calcium, phosphorus, and pH  Outcome: Ongoing     Problem: Loneliness or Risk for Loneliness  Goal: Demonstrate positive use of time alone when socialization is not possible  Outcome: Ongoing     Problem: Fatigue  Goal: Verbalize increase energy and improved vitality  Outcome: Ongoing     Problem: Patient Education: Go to Patient Education Activity  Goal: Patient/Family Education  Outcome: Ongoing     Problem: Falls - Risk of:  Goal: Will remain free from falls  Description: Will remain free from falls  10/25/2020 2224 by Roxanne Ortiz RN  Outcome: Ongoing  10/25/2020 1453 by Michael Keene RN  Outcome: Ongoing  Goal: Absence of physical injury  Description: Absence of physical injury  Outcome: Ongoing   Pt is a fall risk this admission. Fall precautions posted and non-skid footwear on. Telemonitor in room.  Bed in lowest locked position, call light and items within reach, side rails up X3 per pt preference. RN will continue to monitor.    Problem: Skin Integrity:  Goal: Will show no infection signs and symptoms  Description: Will show no infection signs and symptoms  Outcome: Ongoing  Goal: Absence of new skin breakdown  Description: Absence of new skin breakdown  Outcome: Ongoing     Problem: Confusion - Acute:  Goal: Absence of continued neurological deterioration signs and symptoms  Description: Absence of continued neurological deterioration signs and symptoms  Outcome: Ongoing  Goal: Mental status will be restored to baseline  Description: Mental status will be restored to baseline  Outcome: Ongoing     Problem: Discharge Planning:  Goal: Ability to perform activities of daily living will improve  Description: Ability to perform activities of daily living will improve  Outcome: Ongoing  Goal: Participates in care planning  Description: Participates in care planning  Outcome: Ongoing     Problem: Injury - Risk of, Physical Injury:  Goal: Will remain free from falls  Description: Will remain free from falls  10/25/2020 2224 by Kay Silveira RN  Outcome: Ongoing  10/25/2020 1453 by Cooper Nielsen RN  Outcome: Ongoing  Goal: Absence of physical injury  Description: Absence of physical injury  Outcome: Ongoing     Problem: Mood - Altered:  Goal: Mood stable  Description: Mood stable  Outcome: Ongoing  Goal: Absence of abusive behavior  Description: Absence of abusive behavior  Outcome: Ongoing  Goal: Verbalizations of feeling emotionally comfortable while being cared for will increase  Description: Verbalizations of feeling emotionally comfortable while being cared for will increase  Outcome: Ongoing     Problem: Psychomotor Activity - Altered:  Goal: Absence of psychomotor disturbance signs and symptoms  Description: Absence of psychomotor disturbance signs and symptoms  Outcome: Ongoing     Problem: Sensory Perception -

## 2020-10-26 NOTE — PROGRESS NOTES
Pt. Is refusing oral medications. Pts heart rate is running 120-130s.  Oncall NP notified and is going to put orders in for IV cardizem

## 2020-10-26 NOTE — PROGRESS NOTES
hours.  FASTING LIPID PANEL:  Lab Results   Component Value Date    HDL 43 06/17/2019    TRIG 140 06/17/2019     LIVER PROFILE:  Recent Labs     10/24/20  0555   AST 16   ALT 10   LABALBU 3.8   ALKPHOS 78   BILITOT 0.39   PROT 6.5   ALBUMIN NOT REPORTED        ASSESSMENT:  1. Paroxysmal atrial fibrillation. 2. SARS2 COV infection with pneumonitis. 3.  Hypertension. 4.CKD. Patient Active Problem List   Diagnosis    Renal insufficiency    Urinary incontinence    Osteoporosis    Abnormal cardiovascular stress test    Pulmonary hypertension due to mitral valve disease ([address]    Hx of mitral valve repair    S/P CABG x 2    Right HF (heart failure) secondary to left HF (heart failure) ([address]    Acute right-sided [facility]est[facility]    Acute coronary syndrome ([address]    Drug allergy, multiple    Unstable angina ([address]    Essential hypertension    Vascular dementia without behavioral disturbance ([address]    Dyslipidemia    Urinary bladder disorder    History of recurrent UTI (urinary tract infection)    Angina pectoris (HCC)    SOB (shortness of breath) on exertion    Dizziness    GERD (gastroesophageal reflux disease)    Chronic renal failure, stage 3 (moderate)    Weakness    Hypokalemia    Acute confusion    Chronic cystitis    Hydronephrosis, right    Neurogenic bladder    Hypertension    Osteoporosis    Acquired cystic kidney disease    Calculus of kidney    Renal cyst, left    Stress incontinence of urine    Urge incontinence of urine    Recurrent UTI    Urinary retention with incomplete bladder emptying    COVID-19 virus infection    Urinary tract infection without hematuria    Swallowed foreign body    Paroxysmal atrial fibrillation (HCC)       PLAN:  1. Continue Eliquis for thromboprophylaxis. 2.  Continue diltiazem for ventricular rate control. 3.  Please call if we can be of further assistance. Discussed with  nursing.     Dewey Harrell MD

## 2020-10-26 NOTE — PLAN OF CARE
Problem: Airway Clearance - Ineffective  Goal: Achieve or maintain patent airway  10/26/2020 0842 by Saji Beard RN  Outcome: Ongoing  10/25/2020 2224 by Emy Garcia RN  Outcome: Ongoing     Problem: Gas Exchange - Impaired  Goal: Absence of hypoxia  10/26/2020 0842 by Saji Beard RN  Outcome: Ongoing  10/25/2020 2224 by Emy Garcia RN  Outcome: Ongoing  Goal: Promote optimal lung function  10/26/2020 0842 by Saji Beard RN  Outcome: Ongoing  10/25/2020 2224 by Emy Garcia RN  Outcome: Ongoing     Problem: Breathing Pattern - Ineffective  Goal: Ability to achieve and maintain a regular respiratory rate  10/26/2020 0842 by Saji Beard RN  Outcome: Ongoing  10/25/2020 2224 by Emy Garcia RN  Outcome: Ongoing     Problem:  Body Temperature -  Risk of, Imbalanced  Goal: Ability to maintain a body temperature within defined limits  10/26/2020 0842 by Saji Beard RN  Outcome: Ongoing  10/25/2020 2224 by Emy Garcia RN  Outcome: Ongoing  Goal: Will regain or maintain usual level of consciousness  10/26/2020 0842 by Saji Beard RN  Outcome: Ongoing  10/25/2020 2224 by Emy Garcia RN  Outcome: Ongoing  Goal: Complications related to the disease process, condition or treatment will be avoided or minimized  10/26/2020 0842 by Saji Beard RN  Outcome: Ongoing  10/25/2020 2224 by Emy Garcia RN  Outcome: Ongoing     Problem: Isolation Precautions - Risk of Spread of Infection  Goal: Prevent transmission of infection  10/26/2020 0842 by Saji Beard RN  Outcome: Ongoing  10/25/2020 2224 by Emy Garcia RN  Outcome: Ongoing     Problem: Nutrition Deficits  Goal: Optimize nutrtional status  10/26/2020 0842 by Saji Beard RN  Outcome: Ongoing  10/25/2020 2224 by Emy Garcia RN  Outcome: Ongoing     Problem: Risk for Fluid Volume Deficit  Goal: Maintain normal heart rhythm  10/26/2020 0842 by Saji Beard RN  Outcome: Ongoing  10/25/2020 2224 by Kathyanne Gottron, RN  Outcome: Ongoing  Goal: Maintain absence of muscle cramping  10/26/2020 0842 by Rosalba Talamantes RN  Outcome: Ongoing  10/25/2020 2224 by Kathyanne Gottron, RN  Outcome: Ongoing  Goal: Maintain normal serum potassium, sodium, calcium, phosphorus, and pH  10/26/2020 0842 by Rosalba Talamantes RN  Outcome: Ongoing  10/25/2020 2224 by Kathyanne Gottron, RN  Outcome: Ongoing     Problem: Loneliness or Risk for Loneliness  Goal: Demonstrate positive use of time alone when socialization is not possible  10/26/2020 0842 by Rosalba Talamantes RN  Outcome: Ongoing  10/25/2020 2224 by Kathyanne Gottron, RN  Outcome: Ongoing     Problem: Fatigue  Goal: Verbalize increase energy and improved vitality  10/26/2020 0842 by Rosalba Talamantes RN  Outcome: Ongoing  10/25/2020 2224 by Kathyanne Gottron, RN  Outcome: Ongoing     Problem: Patient Education: Go to Patient Education Activity  Goal: Patient/Family Education  10/26/2020 2533 by Rosalba Talamantes RN  Outcome: Ongoing  10/25/2020 2224 by Kathyanne Gottron, RN  Outcome: Ongoing     Problem: Falls - Risk of:  Goal: Will remain free from falls  Description: Will remain free from falls  10/26/2020 0842 by Rosalba Talamantes RN  Outcome: Ongoing  10/25/2020 2224 by Kathyanne Gottron, RN  Outcome: Ongoing  Goal: Absence of physical injury  Description: Absence of physical injury  10/26/2020 0842 by Rosalba Talamantes RN  Outcome: Ongoing  10/25/2020 2224 by Kathyanne Gottron, RN  Outcome: Ongoing     Problem: Skin Integrity:  Goal: Will show no infection signs and symptoms  Description: Will show no infection signs and symptoms  10/26/2020 0842 by Rosalba Talamantes RN  Outcome: Ongoing  10/25/2020 2224 by Kathyanne Gottron, RN  Outcome: Ongoing  Goal: Absence of new skin breakdown  Description: Absence of new skin breakdown  10/26/2020 0842 by Rosalba Talamantes RN  Outcome: Ongoing  10/25/2020 2224 by Kathyanne Gottron, RN  Outcome: Ongoing     Problem: Confusion - Acute:  Goal: Absence of continued neurological deterioration signs and symptoms  Description: Absence of continued neurological deterioration signs and symptoms  10/26/2020 0842 by Sonal Ulloa RN  Outcome: Ongoing  10/25/2020 2224 by Ekaterina Hagan RN  Outcome: Ongoing  Goal: Mental status will be restored to baseline  Description: Mental status will be restored to baseline  10/26/2020 0842 by Sonal Ulloa RN  Outcome: Ongoing  10/25/2020 2224 by Ekaterina Hagan RN  Outcome: Ongoing     Problem: Discharge Planning:  Goal: Ability to perform activities of daily living will improve  Description: Ability to perform activities of daily living will improve  10/26/2020 0842 by Sonal Ulloa RN  Outcome: Ongoing  10/25/2020 2224 by Ekaterina Hagan RN  Outcome: Ongoing  Goal: Participates in care planning  Description: Participates in care planning  10/26/2020 0842 by Sonal Ulloa RN  Outcome: Ongoing  10/25/2020 2224 by Ekaterina Hagan RN  Outcome: Ongoing     Problem: Injury - Risk of, Physical Injury:  Goal: Will remain free from falls  Description: Will remain free from falls  10/26/2020 0842 by Sonal Ulloa RN  Outcome: Ongoing  10/25/2020 2224 by Ekaterina Hagan RN  Outcome: Ongoing  Goal: Absence of physical injury  Description: Absence of physical injury  10/26/2020 0842 by Sonal Ulloa RN  Outcome: Ongoing  10/25/2020 2224 by Ekaterina Hagan RN  Outcome: Ongoing     Problem: Mood - Altered:  Goal: Mood stable  Description: Mood stable  10/26/2020 0842 by Sonal Ulloa RN  Outcome: Ongoing  10/25/2020 2224 by Ekaterina Hagan RN  Outcome: Ongoing  Goal: Absence of abusive behavior  Description: Absence of abusive behavior  10/26/2020 0842 by Sonal Ulloa RN  Outcome: Ongoing  10/25/2020 2224 by Ekaterina Hagan RN  Outcome: Ongoing  Goal: Verbalizations of feeling emotionally comfortable while being cared for will increase  Description: Verbalizations of feeling emotionally comfortable while being cared for will increase  10/26/2020 0842 by Sam Escobar RN  Outcome: Ongoing  10/25/2020 2224 by Lis Sandhu RN  Outcome: Ongoing     Problem: Psychomotor Activity - Altered:  Goal: Absence of psychomotor disturbance signs and symptoms  Description: Absence of psychomotor disturbance signs and symptoms  10/26/2020 0842 by Sam Escobar RN  Outcome: Ongoing  10/25/2020 2224 by Lis Sandhu RN  Outcome: Ongoing     Problem: Sensory Perception - Impaired:  Goal: Demonstrations of improved sensory functioning will increase  Description: Demonstrations of improved sensory functioning will increase  10/26/2020 0842 by Sam Escobar RN  Outcome: Ongoing  10/25/2020 2224 by Lis Sandhu RN  Outcome: Ongoing  Goal: Decrease in sensory misperception frequency  Description: Decrease in sensory misperception frequency  10/26/2020 0842 by Sam Escobar RN  Outcome: Ongoing  10/25/2020 2224 by Lis Sandhu RN  Outcome: Ongoing  Goal: Able to refrain from responding to false sensory perceptions  Description: Able to refrain from responding to false sensory perceptions  10/26/2020 0842 by Sam Escobar RN  Outcome: Ongoing  10/25/2020 2224 by Lis Sandhu RN  Outcome: Ongoing  Goal: Demonstrates accurate environmental perceptions  Description: Demonstrates accurate environmental perceptions  10/26/2020 0842 by Sam Escobar RN  Outcome: Ongoing  10/25/2020 2224 by Lis Sandhu RN  Outcome: Ongoing  Goal: Able to distinguish between reality-based and nonreality-based thinking  Description: Able to distinguish between reality-based and nonreality-based thinking  10/26/2020 0842 by Sam Escobar RN  Outcome: Ongoing  10/25/2020 2224 by Lis Sandhu RN  Outcome: Ongoing  Goal: Able to interrupt nonreality-based thinking  Description: Able to interrupt nonreality-based thinking  10/26/2020 0842 by Sam Escobar RN  Outcome: Ongoing  10/25/2020 2224 by Lis Sandhu RN  Outcome: Ongoing     Problem: Sleep Pattern Disturbance:  Goal: Appears well-rested  Description: Appears well-rested  10/26/2020 0842 by Radha Weber RN  Outcome: Ongoing  10/25/2020 2224 by Becky Warner RN  Outcome: Ongoing

## 2020-10-27 PROCEDURE — 6370000000 HC RX 637 (ALT 250 FOR IP): Performed by: INTERNAL MEDICINE

## 2020-10-27 PROCEDURE — 97535 SELF CARE MNGMENT TRAINING: CPT

## 2020-10-27 PROCEDURE — 1200000000 HC SEMI PRIVATE

## 2020-10-27 PROCEDURE — 99233 SBSQ HOSP IP/OBS HIGH 50: CPT | Performed by: INTERNAL MEDICINE

## 2020-10-27 PROCEDURE — 97530 THERAPEUTIC ACTIVITIES: CPT

## 2020-10-27 PROCEDURE — 2580000003 HC RX 258: Performed by: NURSE PRACTITIONER

## 2020-10-27 PROCEDURE — 2500000003 HC RX 250 WO HCPCS: Performed by: NURSE PRACTITIONER

## 2020-10-27 PROCEDURE — 94761 N-INVAS EAR/PLS OXIMETRY MLT: CPT

## 2020-10-27 RX ORDER — DILTIAZEM HYDROCHLORIDE 60 MG/1
60 TABLET, FILM COATED ORAL EVERY 6 HOURS SCHEDULED
Status: DISCONTINUED | OUTPATIENT
Start: 2020-10-27 | End: 2020-10-27

## 2020-10-27 RX ORDER — QUETIAPINE FUMARATE 25 MG/1
50 TABLET, FILM COATED ORAL 2 TIMES DAILY
Status: DISCONTINUED | OUTPATIENT
Start: 2020-10-27 | End: 2020-10-29 | Stop reason: HOSPADM

## 2020-10-27 RX ORDER — QUETIAPINE FUMARATE 25 MG/1
100 TABLET, FILM COATED ORAL DAILY
Status: DISCONTINUED | OUTPATIENT
Start: 2020-10-28 | End: 2020-10-29 | Stop reason: HOSPADM

## 2020-10-27 RX ORDER — DILTIAZEM HYDROCHLORIDE 180 MG/1
180 CAPSULE, COATED, EXTENDED RELEASE ORAL DAILY
Status: DISCONTINUED | OUTPATIENT
Start: 2020-10-28 | End: 2020-10-29 | Stop reason: HOSPADM

## 2020-10-27 RX ORDER — DILTIAZEM HYDROCHLORIDE 5 MG/ML
5 INJECTION INTRAVENOUS ONCE
Status: COMPLETED | OUTPATIENT
Start: 2020-10-27 | End: 2020-10-27

## 2020-10-27 RX ADMIN — DILTIAZEM HYDROCHLORIDE 60 MG: 60 TABLET, FILM COATED ORAL at 12:56

## 2020-10-27 RX ADMIN — DILTIAZEM HYDROCHLORIDE 5 MG: 5 INJECTION INTRAVENOUS at 05:55

## 2020-10-27 RX ADMIN — Medication 10 ML: at 09:49

## 2020-10-27 RX ADMIN — QUETIAPINE FUMARATE 25 MG: 25 TABLET ORAL at 09:41

## 2020-10-27 RX ADMIN — QUETIAPINE FUMARATE 25 MG: 25 TABLET ORAL at 12:56

## 2020-10-27 RX ADMIN — APIXABAN 2.5 MG: 2.5 TABLET, FILM COATED ORAL at 09:41

## 2020-10-27 RX ADMIN — Medication 10 ML: at 20:21

## 2020-10-27 ASSESSMENT — PAIN SCALES - PAIN ASSESSMENT IN ADVANCED DEMENTIA (PAINAD)
CONSOLABILITY: 0
BREATHING: 0
BREATHING: 0
NEGVOCALIZATION: 0
BREATHING: 0
BREATHING: 0
NEGVOCALIZATION: 0
FACIALEXPRESSION: 0
FACIALEXPRESSION: 0
BODYLANGUAGE: 0
NEGVOCALIZATION: 0
BODYLANGUAGE: 0
TOTALSCORE: 0
BODYLANGUAGE: 0
TOTALSCORE: 0
TOTALSCORE: 0
NEGVOCALIZATION: 0
NEGVOCALIZATION: 0
FACIALEXPRESSION: 0
BREATHING: 0
TOTALSCORE: 0
NEGVOCALIZATION: 0
FACIALEXPRESSION: 0
CONSOLABILITY: 0
BODYLANGUAGE: 0
BREATHING: 0
NEGVOCALIZATION: 0
BODYLANGUAGE: 0
NEGVOCALIZATION: 0
BODYLANGUAGE: 0
NEGVOCALIZATION: 0
FACIALEXPRESSION: 0
BREATHING: 0
CONSOLABILITY: 0
TOTALSCORE: 0
TOTALSCORE: 0
FACIALEXPRESSION: 0
CONSOLABILITY: 0
BODYLANGUAGE: 0
NEGVOCALIZATION: 0
BODYLANGUAGE: 0
CONSOLABILITY: 0
FACIALEXPRESSION: 0
BODYLANGUAGE: 0
BODYLANGUAGE: 0
CONSOLABILITY: 0
TOTALSCORE: 0
BREATHING: 0
FACIALEXPRESSION: 0
TOTALSCORE: 0
FACIALEXPRESSION: 0
BODYLANGUAGE: 0
TOTALSCORE: 0
NEGVOCALIZATION: 0
FACIALEXPRESSION: 0
TOTALSCORE: 0
TOTALSCORE: 0
CONSOLABILITY: 0
NEGVOCALIZATION: 0
BODYLANGUAGE: 0
CONSOLABILITY: 0
BODYLANGUAGE: 0
TOTALSCORE: 0
FACIALEXPRESSION: 0
BODYLANGUAGE: 0
NEGVOCALIZATION: 0
BREATHING: 0
CONSOLABILITY: 0
NEGVOCALIZATION: 0
FACIALEXPRESSION: 0
CONSOLABILITY: 0
BODYLANGUAGE: 0
NEGVOCALIZATION: 0
BREATHING: 0
FACIALEXPRESSION: 0
BREATHING: 0
NEGVOCALIZATION: 0
CONSOLABILITY: 0
TOTALSCORE: 0
BREATHING: 0
FACIALEXPRESSION: 0
FACIALEXPRESSION: 0
CONSOLABILITY: 0
TOTALSCORE: 0
CONSOLABILITY: 0
FACIALEXPRESSION: 0
TOTALSCORE: 0
FACIALEXPRESSION: 0
TOTALSCORE: 0
TOTALSCORE: 0
NEGVOCALIZATION: 0
BREATHING: 0
NEGVOCALIZATION: 0
BODYLANGUAGE: 0
BREATHING: 0
BODYLANGUAGE: 0
BREATHING: 0
BREATHING: 0
TOTALSCORE: 0
CONSOLABILITY: 0
BREATHING: 0
BODYLANGUAGE: 0
FACIALEXPRESSION: 0
CONSOLABILITY: 0
NEGVOCALIZATION: 0
BREATHING: 0
FACIALEXPRESSION: 0
CONSOLABILITY: 0
CONSOLABILITY: 0
BREATHING: 0
BODYLANGUAGE: 0
CONSOLABILITY: 0
TOTALSCORE: 0
FACIALEXPRESSION: 0
BREATHING: 0
BODYLANGUAGE: 0
NEGVOCALIZATION: 0
TOTALSCORE: 0

## 2020-10-27 NOTE — PROGRESS NOTES
[facility]  Office: [address], DO, Erich Emma, DO, Peribin De, DO, Vladimir Gerardokeila Blood, DO, Wilver Coulter MD, Yossi Xiong MD, Agnes Bugrer MD, Hanh Kelley MD, James Yan MD, Bret Paredes MD, Karena Stevens MD, Katlin Ariza MD, Ariel Calhoun MD, Davida Hall, DO, Sheila Garcia MD, Tiago Ray MD, Janny Walker DO, Mahesh Ji MD,  Bandar Vasquez DO, Eagle Riggins MD, Mathieu Hassan MD, Maylin Taylor, [facility], [facility], CNP, Emy Francisco, CNP, Filiberto Hui, CNS, Hina Bean, CNP, Danielle Shah, CNP, Keila Crews, CNP, Doirs Tang, CNP, Amarilys Hickman, CNP, Brittny Jenkins PA-C, Gomez Rebolledo, [facility], Trever Gonzales, CNP, Alaina Herrera, CNP, Gavin Garcia, CNP, Kimmie Ryan, CNP, Constantino Alicia, [facility]    Progress Note    10/27/2020    7:45 AM    Name:   Laurie Sawant  MRN:     [id number]     Guillaume Lauren:      [de-identified]   Room:   [facility]/1015-01   Day:  5  Admit Date:  10/22/2020  6:03 PM    PCP:   Anabel Hui DO  Code Status:  DNR-CC    Subjective:     C/C:   Chief Complaint   Patient presents with   Floydene Ada Fever     Interval History Status: not changed. Patient seen and examined this morning. Patient continues to intermittently not take her medications, although she has been somewhat more compliant today. Heart rate is much more controlled over the past 12 to 24 hours. She states that she feels poorly. However, she is unable to elaborate on what that entails. Blood pressure is adequate. No new fevers. Resting on room air. Remains confused. Brief History:     80 F presenting for evaluation of fever. Patient is unable to provide information due to mental status, so information was obtained from the Emergency Department note.  Patient was brought to the ED via private EMS for further evaluation of fever. She has a history of hypertension, dementia, dyslipidemia, GERD, recurrent UTIs, and previous CABG. she resides at [facility], where an employee tested positive for COVID-19. The facility was concerned for COVID due to her fever, so she was sent to the ED for evaluation. She is not hypoxic or requiring oxygen. Her COVID screening was positive.  She was noted to be in atrial fibrillation, which she has no history of prior to today. Caralyn Bars urinalysis was positive for large leukocytes, moderate bacteria, and trace hemoglobin.  Blood cultures were obtained by the emergency department.  Chest x-ray does not note any acute cardiopulmonary process, but does note a ring overlying the epigastric region. eNna Ann is being admitted for further evaluation and management of urinary tract infection. Review of Systems:     Unable to obtain as patient is a poor historian. Medications: Allergies:     Allergies   Allergen Reactions    Latex Itching    Actonel [Risedronate Sodium] Other (See Comments)     abdo pain      Adhesive Tape Other (See Comments)     band-aid  band-aid    Aminophylline Itching     Other reaction(s): Unknown    Amlodipine     Celebrex [Celecoxib] Itching     Other reaction(s): Unknown  Other reaction(s): Unknown    Chlorthalidone     Ciprofloxacin      Pain muscles      Codeine Nausea Only     Other reaction(s): Unknown  headache    Colesevelam Hcl     Darvocet A500 [Propoxyphene N-Acetaminophen] Nausea Only     headache    Demerol Hcl [Meperidine] Itching     Other reaction(s): Unknown  Flash backs- and did not help pain  Other reaction(s): Unknown  Flash backs- and did not help pain    Lisinopril     Lovastatin     Macrobid [Nitrofurantoin Monohyd Macro] Hives    Metoprolol     Nitrofuran Derivatives     Nitrofurantoin Hives     Other reaction(s): Unknown    Propoxyphene Nausea Only     headache    Ranitidine     Ranitidine Hcl      pain    Simvastatin     Tramadol Hcl      Other reaction(s): Unknown    Tramadol Hcl Other reaction(s): Unknown    Trimethadione     Trimethoprim     Ultram [Tramadol] Itching     Other reaction(s): Unknown    Vioxx [Rofecoxib]     Naproxen Rash and Itching       rash    Sulfa Antibiotics Rash and Itching     Other reaction(s): Unknown       Current Meds:   Scheduled Meds:    apixaban  2.5 mg Oral BID    dilTIAZem  30 mg Oral 4 times per day    sodium chloride flush  10 mL Intravenous 2 times per day    melatonin  9 mg Oral Nightly    QUEtiapine  100 mg Oral Daily    QUEtiapine  25 mg Oral 4x Daily    sodium chloride  1,000 mL Intravenous Once     Continuous Infusions:   PRN Meds: sodium chloride flush, acetaminophen **OR** acetaminophen, polyethylene glycol, promethazine **OR** ondansetron    Data:     Past Medical History:   has a past medical history of Abnormal cardiovascular stress test, Acute confusion, Acute coronary syndrome (HCC), Acute right-sided CHF (congestive heart failure) ([facility]), Angina pectoris ([facility]), Arthritis, Atrial fibrillation ([facility]), CAD (coronary artery disease), Calculus of kidney, CHF (congestive heart failure) ([address] Chronic cystitis, Chronic renal failure, stage 3 (moderate), Dementia ([address] Dyslipidemia, Essential hypertension, GERD (gastroesophageal reflux disease), GERD (gastroesophageal reflux disease), Hx of mitral valve repair, Hypertension, Hypokalemia, Neurogenic bladder, Osteoporosis, Pulmonary hypertension due to mitral valve disease ([address] Renal cyst, left, Renal insufficiency, Right HF (heart failure) secondary to left HF (heart failure) ([address] S/P CABG x 2, Stress incontinence of urine, Unstable angina ([facility]), Urge incontinence of urine, Urinary incontinence, UTI (urinary tract infection), and Vascular dementia without behavioral disturbance ([address] Social History:   reports that she has never smoked. She has never used smokeless tobacco. She reports that she does not drink alcohol or use drugs.      Family History:   Family History   Problem Relation Age of Onset    Diabetes Mother     Cancer Father     Anemia Father        Vitals:  BP (!) 155/90   Pulse 115   Temp 97.5 °F (36.4 °C) (Axillary)   Resp 16   Ht 5' 4\" (1.626 m)   Wt 85 lb 8 oz (38.8 kg)   SpO2 90%   BMI 14.68 kg/m²   Temp (24hrs), Av.7 °F (36.5 °C), Min:97.5 °F (36.4 °C), Max:98 °F (36.7 °C)    No results for input(s): POCGLU in the last 72 hours. I/O (24Hr): Intake/Output Summary (Last 24 hours) at 10/27/2020 0745  Last data filed at 10/27/2020 0053  Gross per 24 hour   Intake 120 ml   Output --   Net 120 ml       Labs:  Hematology:No results for input(s): WBC, RBC, HGB, HCT, MCV, MCH, MCHC, RDW, PLT, MPV, SEDRATE, CRP, INR, DDIMER, IX8DSELO, LABABSO in the last 72 hours. Invalid input(s): PT  Chemistry:  Recent Labs     10/25/20  0544      K 3.7      CO2 26   GLUCOSE 94   BUN 11   CREATININE 0.72   ANIONGAP 9   LABGLOM >60   GFRAA >60   CALCIUM 9.1   No results for input(s): PROT, LABALBU, LABA1C, Y4WUUFZ, S6ZGTVT, FT4, TSH, AST, ALT, LDH, GGT, ALKPHOS, LABGGT, BILITOT, BILIDIR, AMMONIA, AMYLASE, LIPASE, LACTATE, CHOL, HDL, LDLCHOLESTEROL, CHOLHDLRATIO, TRIG, VLDL, OMG30WO, PHENYTOIN, PHENYF, URICACID, POCGLU in the last 72 hours. ABG:  Lab Results   Component Value Date    FIO2 NOT REPORTED 2016     Lab Results   Component Value Date/Time    SPECIAL LAC 10ML 10/22/2020 07:45 PM     Lab Results   Component Value Date/Time    CULTURE NO GROWTH 4 DAYS 10/22/2020 07:45 PM       Radiology:  Justyna [place] Abdomen (kub) (single Ap View)    Result Date: 10/24/2020  Ring foreign body is unchanged likely within the distal esophagus. Xr Abdomen (kub) (single Ap View)    Result Date: 10/23/2020  Ingested ring overlying the lower mediastinum superior to the diaphragm presumably in the distal esophagus.      Xr Chest Portable    Result Date: 10/22/2020  No acute cardiopulmonary process     Xr Chest 1 View    Result Date: 10/22/2020  Single lateral view again demonstrates the previously noted ring overlying the epigastric region and appears to be intrinsic, possibly in the stomach. Physical Examination:        General appearance:  alert, cooperative and no distress, very elderly and very frail  female  Mental Status:  oriented to person, place and time and normal affect  Lungs:  clear to auscultation bilaterally, normal effort  Heart: Irregular rhythm, controlled rate, no murmur  Abdomen:  soft, nontender, nondistended, normal bowel sounds, no masses, hepatomegaly, splenomegaly  Extremities:  no edema, redness, tenderness in the calves  Skin: Multiple ecchymoses present on bilateral upper extremities    Assessment:        Hospital Problems           Last Modified POA    * (Principal) Urinary tract infection without hematuria 10/23/2020 Yes    Drug allergy, multiple 10/23/2020 Yes    Essential hypertension 10/22/2020 Yes    Vascular dementia without behavioral disturbance ([facility] 10/22/2020 Yes    Dyslipidemia 10/22/2020 Yes    History of recurrent UTI (urinary tract infection) 10/22/2020 Yes    GERD (gastroesophageal reflux disease) 10/22/2020 Yes    COVID-19 virus infection 10/22/2020 Yes    Swallowed foreign body 10/23/2020 Yes    Paroxysmal atrial fibrillation ([facility] 10/23/2020 Yes          Plan:        1. Labs and vitals reviewed  2. Cardiology following for A-fib  3. Increase dose of diltiazem to 60 mg TID; however, patient not compliant with 3 times a day dosing, changed to extended release Cardizem 180 mg daily in the morning  4. Continue Eliquis   5. Adjusted dose of seroquel to 100 mg qhs (12 am); consolidated 4 daytime doses to 2 daytime doses  6. Increase activity -  PT / OT  7. Check AM labs  8. DNR-CC  9. Dispo: Patient is a max assist.  Will need placement at SNF prior to returning to [facility]. However, her mentation remains poor and with that, prognosis is guarded.     33 Brandy Obregon DO  10/27/2020  7:45 AM

## 2020-10-27 NOTE — CARE COORDINATION
Discharge planning    Call from [facility] that patient may be able to return to [facility] and to call Cruz Sanchez at [phone] or [phone]    Call to the above number and message left. Await call back       Call back from Kate Crowell at [facility]. They can accept her but if looking at PT notes she was max assist and would like her to go to snf to get her back to her baseline. Updated SS.      SS has note that [facility] and await precert

## 2020-10-27 NOTE — PROGRESS NOTES
Cardizem 10 mg IV given to patient . Patient is still refusing PO medication. Numerous attempts have been made to patient.

## 2020-10-27 NOTE — PROGRESS NOTES
Pt. Is becoming tachycardiac on monitor 120s and blood pressure is elevated 155/90. Pt is asymptomatic. Writer reached out to NP on call Itzel Olmos. Waiting on response.

## 2020-10-27 NOTE — CARE COORDINATION
Social work: Phone call from [facility], they are able to accept and precert is pending at this time. HENS completed for [facility].

## 2020-10-27 NOTE — CARE COORDINATION
Social work: spoke with Bharat/alexandro and son informed them per Rigoberto/RN CM Debi johnson is recommending patient go to SNF for short time, as she is max assist at this time- family is in agreement with snf placement. precert pending for [facility] at this time.

## 2020-10-27 NOTE — CARE COORDINATION
Social work: Phone call from [facility] stating she spoke with Seferino Parra at [facility] and they will take patient back to [facility] facility; family preference is for patient to return to [facility]. Rigoberto/Francisco Javier CM informed of above.      # for Rima Barajas [phone] or [phone]

## 2020-10-27 NOTE — PROGRESS NOTES
DNR-CC document found in pts. Paper chart binder. On call NP notified and updated pts. Online Chart.

## 2020-10-27 NOTE — PROGRESS NOTES
Physical Therapy  Facility/Department: [facility] PROGRESSIVE CARE  Daily Treatment Note  NAME: Desirae Lindsay  : 1929  MRN: [id number]    Date of Service: 10/27/2020    Discharge Recommendations:  [address] Me[address]ias RN reports patient is medically stable for therapy treatment this date. Chart reviewed prior to treatment and patient is agreeable for therapy. All lines intact and patient positioned comfortably at end of treatment. All patient needs addressed prior to ending therapy session. Assessment   Body structures, Functions, Activity limitations: Decreased functional mobility ; Decreased ROM; Decreased strength;Decreased safe awareness;Decreased cognition;Decreased balance;Decreased endurance;Decreased ADL status; Decreased posture  Assessment: Pt with max deficits of bed mobility, transfers, balance, cognition, safety awareness and endurance this session,  & required 2 assist for ALL functional mobility & needed Boyce Pluck for transfers. Pt HIGH risk for falls & recommend SNF at D/C to return to baseline function and a safe D/C back to Asst living. Pt requires continued PT to maximize independence with functional mobility, balance, safety awareness & activity tolerance. Specific instructions for Next Treatment: progress mobility as toleraated  Prognosis: Fair  Exam: ROM, MMT, functional mobility, activity tolerance, Balance, & [employer] AM-PAC 6 Clicks Basic Mobility  Clinical Presentation: evolving  PT Education: Functional Mobility Training;PT Role;Pressure Relief  Patient Education: Poor carryover with education, not receptive  REQUIRES PT FOLLOW UP: Yes  Activity Tolerance  Activity Tolerance: Patient limited by fatigue;Patient limited by endurance; Patient limited by cognitive status     Patient Diagnosis(es): The primary encounter diagnosis was Urinary tract infection without hematuria, site unspecified.  Diagnoses of COVID-19 and Swallowed foreign body, initial encounter were also pertinent to this visit. has a past medical history of Abnormal cardiovascular stress test, Acute confusion, Acute coronary syndrome (HCC), Acute right-sided CHF (congestive heart failure) ([address] Angina pectoris (HCC), Arthritis, Atrial fibrillation ([address] CAD (coronary artery disease), Calculus of kidney, CHF (congestive heart failure) ([address] Chronic cystitis, Chronic renal failure, stage 3 (moderate), Dementia ([address] Dyslipidemia, Essential hypertension, GERD (gastroesophageal reflux disease), GERD (gastroesophageal reflux disease), Hx of mitral valve repair, Hypertension, Hypokalemia, Neurogenic bladder, Osteoporosis, Pulmonary hypertension due to mitral valve disease ([address] Renal cyst, left, Renal insufficiency, Right HF (heart failure) secondary to left HF (heart failure) ([address] S/P CABG x 2, Stress incontinence of urine, Unstable angina ([address] Urge incontinence of urine, Urinary incontinence, UTI (urinary tract infection), and Vascular dementia without behavioral disturbance ([address] has a past surgical history that includes Hysterectomy; Dilation and curettage of uterus; Appendectomy; hernia repair; Eye surgery (Right); Coronary artery bypass graft (N/A, 2003); and Cardiac catheterization (4-). Restrictions  Restrictions/Precautions  Restrictions/Precautions: Up as Tolerated, Fall Risk, Isolation, General Precautions  Position Activity Restriction  Other position/activity restrictions: Droplet Plus Isolation for COVID-19, up with 2 assist, Heels off bed if unable to move LE's, turn/assist Q 2 hrs if unable to turn self  Subjective   General  Chart Reviewed: Yes  Response To Previous Treatment: Patient with no complaints from previous session. Subjective  Subjective: Pt unable to give subjective this date.           Cognition   Cognition  Overall Cognitive Status: Exceptions  Arousal/Alertness: Inconsistent responses to stimuli  Following Commands: Inconsistently follows skilled therapy professionals to address individual discipline's goals. PT addressing pre gait trunk strengthening, weight shifting prior to transfers, transfer training and postural control in sitting.       Sarah Houser, PT

## 2020-10-27 NOTE — PROGRESS NOTES
Occupational Therapy  Facility/Department: [facility] PROGRESSIVE CARE  Daily Treatment Note  NAME: Kelsey Smith  : 1929  MRN: [id number]    Date of Service: 10/27/2020    Discharge Recommendations:  Subacute/Skilled Nursing Facility       Assessment   Performance deficits / Impairments: Decreased functional mobility ; Decreased ADL status; Decreased strength;Decreased safe awareness;Decreased cognition;Decreased endurance;Decreased balance;Decreased posture  Prognosis: Fair  OT Education: OT Role;Plan of Care;Precautions; ADL Adaptive Strategies;Transfer Training;Energy Conservation;Equipment; Family Education;Orientation  REQUIRES OT FOLLOW UP: Yes  Activity Tolerance  Activity Tolerance: Treatment limited secondary to decreased cognition  Safety Devices  Safety Devices in place: Yes  Type of devices: Call light within reach;Nurse notified; Left in bed;Bed alarm in place; Patient at risk for falls;Gait belt         Patient Diagnosis(es): The primary encounter diagnosis was Urinary tract infection without hematuria, site unspecified. Diagnoses of COVID-19 and Swallowed foreign body, initial encounter were also pertinent to this visit.       has a past medical history of Abnormal cardiovascular stress test, Acute confusion, Acute coronary syndrome (HCC), Acute right-sided CHF (congestive heart failure) ([address] Angina pectoris ([address] Arthritis, Atrial fibrillation ([address] CAD (coronary artery disease), Calculus of kidney, CHF (congestive heart failure) ([address] Chronic cystitis, Chronic renal failure, stage 3 (moderate), Dementia ([address] Dyslipidemia, Essential hypertension, GERD (gastroesophageal reflux disease), GERD (gastroesophageal reflux disease), Hx of mitral valve repair, Hypertension, Hypokalemia, Neurogenic bladder, Osteoporosis, Pulmonary hypertension due to mitral valve disease ([address] Renal cyst, left, Renal insufficiency, Right HF (heart failure) secondary to left HF (heart failure) ([address] S/P CABG x 2, Stress incontinence of urine, Unstable angina (HCC), Urge incontinence of urine, Urinary incontinence, UTI (urinary tract infection), and Vascular dementia without behavioral disturbance [address] [address] has a past surgical history that includes Hysterectomy; Dilation and curettage of uterus; Appendectomy; hernia repair; Eye surgery (Right); Coronary artery bypass graft (N/A, 2003); and Cardiac catheterization (4-). Restrictions  Restrictions/Precautions  Restrictions/Precautions: Up as Tolerated, Fall Risk, Isolation, General Precautions  Position Activity Restriction  Other position/activity restrictions: Droplet Plus Isolation for COVID-19, up with 2 assist, Heels off bed if unable to move LE's, turn/assist Q 2 hrs if unable to turn self  Subjective   General  Chart Reviewed: Yes  Patient assessed for rehabilitation services?: Yes  Additional Pertinent Hx: Dementia  Response to previous treatment: Patient with no complaints from previous session  Family / Caregiver Present: No      Orientation  Orientation  Overall Orientation Status: Impaired  Orientation Level: Oriented to person;Disoriented to time;Disoriented to situation;Disoriented to place  Objective    ADL  Grooming: Verbal cueing; Increased time to complete;Minimal assistance(to comb hair)  UE Dressing: Maximum assistance;Dependent/Total  Toileting: Dependent/Total(brief change supine in bed)  Additional Comments: pt is limited by cognition, dec. standing tolerance, dec. sitting balance        Balance  Sitting Balance: Minimal assistance(Min-CGA once sitting upright.)  Standing Balance: Maximum assistance(x2- patient unable to acheive full stand)    Bed mobility  Rolling to Left: Maximum assistance;2 Person assistance;Dependent/Total  Rolling to Right: Dependent/Total;Maximum assistance;2 Person assistance  Supine to Sit: Dependent/Total;Maximum assistance;2 Person assistance  Sit to Supine: Dependent/Total;Maximum assistance;2 Person assistance  Scooting: Dependent/Total;Maximal assistance;2 Person assistance  Comment: Patient is able to complete minimal bed mobility without assistance requiring increased time and effort to complete, but when directed to complete task, patient inconsistently follows commands and required MAX-DEP x2 assist    Transfers  Sit to stand: 2 Person assistance;Maximum assistance  Stand to sit: 2 Person assistance;Maximum assistance  Transfer Comments: patient unable to follow directions for sit<> [facility]. Patient required Max A x2 for sit>partial stand with blocking B feet to prevent patient's feet sliding out.       Cognition  Overall Cognitive Status: Exceptions  Arousal/Alertness: Inconsistent responses to stimuli  Following Commands: Inconsistently follows commands  Attention Span: Difficulty attending to directions  Memory: Decreased short term memory;Decreased long term memory;Decreased recall of precautions;Decreased recall of recent events;Decreased recall of biographical Information  Safety Judgement: Decreased awareness of need for assistance;Decreased awareness of need for safety  Problem Solving: Decreased awareness of errors;Assistance required to identify errors made;Assistance required to correct errors made;Assistance required to implement solutions  Insights: Not aware of deficits  Initiation: Requires cues for all  Sequencing: Requires cues for all      Plan   Plan  Times per week: 4-5x/week, 1-2x/day  Current Treatment Recommendations: Strengthening, Functional Mobility Training, Balance Training, Endurance Training, Safety Education & Training, Self-Care / ADL, Equipment Evaluation, Education, & procurement, Patient/Caregiver Education & Training, Positioning, Cognitive Reorientation, Cognitive/Perceptual Training    AM-PAC Score        AM-[facility] Inpatient Daily Activity Raw Score: 10 (10/27/20 1134)  AM-PAC Inpatient ADL T-Scale Score : 27.31 (10/27/20 1134)  ADL Inpatient CMS 0-100% Score: 74.7 (10/27/20 1134)  ADL Inpatient CMS G-Code Modifier : CL (10/27/20 1134)    Goals  Short term goals  Time Frame for Short term goals: by discharge, pt will  Short term goal 1: demo mod A x1 for ADL transfers with approp AD  Short term goal 2: demo min A for self feeding following set up with min cues for initiation/follow through on tasks  Short term goal 3: demo min A with bed mob with min cues for tech  Short term goal 4: demo SBA with sitting balance x 15 min at EOB for inc. ADL completion/prep for transfers  Short term goal 5: participate in AAROM ex for B UEs to inc/maintain strength for transfers/ADLs  Patient Goals   Patient goals : not stated       Therapy Time   Individual Concurrent Group Co-treatment   Time In       2676   Time Out       1127   Minutes       52     Co-treatment with PT warranted secondary to decreased safety and independence requiring 2 skilled therapy professionals to address individual discipline's goals. OT addressing preparation for ADL transfer, sitting balance for increased ADL performance, sitting/activity tolerance, functional reaching, environmental safety/scanning, fall prevention, ability to sequence and follow directions and functional UE strength. Upon writer exit, call light within reach, pt retired to bed. All lines intact and patient positioned comfortably. All patient needs addressed prior to ending therapy session. Chart reviewed prior to treatment and patient is agreeable for therapy. RN reports patient is medically stable for therapy treatment this date. Stimulating environment provided through opening blinds, turning on lights, turning on TV, turning up volume to appropriate level, raising HOB, and opening door.       AMANDO Almaguer

## 2020-10-28 LAB
ANION GAP SERPL CALCULATED.3IONS-SCNC: 7 MMOL/L (ref 9–17)
BUN BLDV-MCNC: 20 MG/DL (ref 8–23)
BUN/CREAT BLD: 33 (ref 9–20)
CALCIUM SERPL-MCNC: 9.5 MG/DL (ref 8.6–10.4)
CHLORIDE BLD-SCNC: 103 MMOL/L (ref 98–107)
CO2: 29 MMOL/L (ref 20–31)
CREAT SERPL-MCNC: 0.61 MG/DL (ref 0.5–0.9)
GFR AFRICAN AMERICAN: >60 ML/MIN
GFR NON-AFRICAN AMERICAN: >60 ML/MIN
GFR SERPL CREATININE-BSD FRML MDRD: ABNORMAL ML/MIN/{1.73_M2}
GFR SERPL CREATININE-BSD FRML MDRD: ABNORMAL ML/MIN/{1.73_M2}
GLUCOSE BLD-MCNC: 102 MG/DL (ref 70–99)
HCT VFR BLD CALC: 40.9 % (ref 36.3–47.1)
HEMOGLOBIN: 13.5 G/DL (ref 11.9–15.1)
MCH RBC QN AUTO: 30.1 PG (ref 25.2–33.5)
MCHC RBC AUTO-ENTMCNC: 33 G/DL (ref 28.4–34.8)
MCV RBC AUTO: 91.1 FL (ref 82.6–102.9)
NRBC AUTOMATED: 0 PER 100 WBC
PDW BLD-RTO: 14.1 % (ref 11.8–14.4)
PLATELET # BLD: 199 K/UL (ref 138–453)
PMV BLD AUTO: 10.5 FL (ref 8.1–13.5)
POTASSIUM SERPL-SCNC: 3.3 MMOL/L (ref 3.7–5.3)
POTASSIUM SERPL-SCNC: 4 MMOL/L (ref 3.7–5.3)
RBC # BLD: 4.49 M/UL (ref 3.95–5.11)
SODIUM BLD-SCNC: 139 MMOL/L (ref 135–144)
WBC # BLD: 3.7 K/UL (ref 3.5–11.3)

## 2020-10-28 PROCEDURE — 6360000002 HC RX W HCPCS: Performed by: NURSE PRACTITIONER

## 2020-10-28 PROCEDURE — 84132 ASSAY OF SERUM POTASSIUM: CPT

## 2020-10-28 PROCEDURE — 99232 SBSQ HOSP IP/OBS MODERATE 35: CPT | Performed by: INTERNAL MEDICINE

## 2020-10-28 PROCEDURE — 36415 COLL VENOUS BLD VENIPUNCTURE: CPT

## 2020-10-28 PROCEDURE — 6370000000 HC RX 637 (ALT 250 FOR IP): Performed by: INTERNAL MEDICINE

## 2020-10-28 PROCEDURE — 97535 SELF CARE MNGMENT TRAINING: CPT

## 2020-10-28 PROCEDURE — 80048 BASIC METABOLIC PNL TOTAL CA: CPT

## 2020-10-28 PROCEDURE — 97530 THERAPEUTIC ACTIVITIES: CPT

## 2020-10-28 PROCEDURE — 1200000000 HC SEMI PRIVATE

## 2020-10-28 PROCEDURE — 85027 COMPLETE CBC AUTOMATED: CPT

## 2020-10-28 PROCEDURE — 2580000003 HC RX 258: Performed by: NURSE PRACTITIONER

## 2020-10-28 RX ORDER — POTASSIUM CHLORIDE 7.45 MG/ML
10 INJECTION INTRAVENOUS PRN
Status: DISCONTINUED | OUTPATIENT
Start: 2020-10-28 | End: 2020-10-29 | Stop reason: HOSPADM

## 2020-10-28 RX ADMIN — POTASSIUM CHLORIDE 10 MEQ: 10 INJECTION, SOLUTION INTRAVENOUS at 12:11

## 2020-10-28 RX ADMIN — DILTIAZEM HYDROCHLORIDE 180 MG: 180 CAPSULE, COATED, EXTENDED RELEASE ORAL at 08:44

## 2020-10-28 RX ADMIN — POTASSIUM CHLORIDE 10 MEQ: 10 INJECTION, SOLUTION INTRAVENOUS at 11:48

## 2020-10-28 RX ADMIN — APIXABAN 2.5 MG: 2.5 TABLET, FILM COATED ORAL at 08:44

## 2020-10-28 RX ADMIN — Medication 10 ML: at 08:44

## 2020-10-28 RX ADMIN — QUETIAPINE FUMARATE 50 MG: 25 TABLET ORAL at 08:44

## 2020-10-28 RX ADMIN — POTASSIUM CHLORIDE 10 MEQ: 10 INJECTION, SOLUTION INTRAVENOUS at 10:00

## 2020-10-28 RX ADMIN — POTASSIUM CHLORIDE 10 MEQ: 10 INJECTION, SOLUTION INTRAVENOUS at 08:44

## 2020-10-28 RX ADMIN — Medication 10 ML: at 21:26

## 2020-10-28 ASSESSMENT — PAIN SCALES - PAIN ASSESSMENT IN ADVANCED DEMENTIA (PAINAD)
FACIALEXPRESSION: 0
BODYLANGUAGE: 0
TOTALSCORE: 0
CONSOLABILITY: 0
BREATHING: 0
FACIALEXPRESSION: 0
NEGVOCALIZATION: 0
BODYLANGUAGE: 0
BODYLANGUAGE: 0
BREATHING: 0
BODYLANGUAGE: 0
BODYLANGUAGE: 0
CONSOLABILITY: 0
CONSOLABILITY: 0
TOTALSCORE: 0
FACIALEXPRESSION: 0
TOTALSCORE: 0
TOTALSCORE: 0
BREATHING: 0
NEGVOCALIZATION: 0
CONSOLABILITY: 0
TOTALSCORE: 0
BREATHING: 0
CONSOLABILITY: 0
TOTALSCORE: 0
CONSOLABILITY: 0
BODYLANGUAGE: 0
BREATHING: 0
TOTALSCORE: 0
FACIALEXPRESSION: 0
FACIALEXPRESSION: 0
TOTALSCORE: 0
FACIALEXPRESSION: 0
FACIALEXPRESSION: 0
TOTALSCORE: 0
BODYLANGUAGE: 0
BREATHING: 0
FACIALEXPRESSION: 0
NEGVOCALIZATION: 0
CONSOLABILITY: 0
NEGVOCALIZATION: 0
BREATHING: 0
TOTALSCORE: 0
TOTALSCORE: 0
NEGVOCALIZATION: 0
TOTALSCORE: 0
CONSOLABILITY: 0
BODYLANGUAGE: 0
FACIALEXPRESSION: 0
TOTALSCORE: 0
FACIALEXPRESSION: 0
NEGVOCALIZATION: 0
NEGVOCALIZATION: 0
FACIALEXPRESSION: 0
TOTALSCORE: 0
NEGVOCALIZATION: 0
TOTALSCORE: 0
TOTALSCORE: 0
NEGVOCALIZATION: 0
BODYLANGUAGE: 0
CONSOLABILITY: 0
FACIALEXPRESSION: 0
BODYLANGUAGE: 0
BREATHING: 0
FACIALEXPRESSION: 0
BODYLANGUAGE: 0
BREATHING: 0
FACIALEXPRESSION: 0
FACIALEXPRESSION: 0
NEGVOCALIZATION: 0
BODYLANGUAGE: 0
TOTALSCORE: 0
BREATHING: 0
BODYLANGUAGE: 0
CONSOLABILITY: 0
CONSOLABILITY: 0
TOTALSCORE: 0
BREATHING: 0
FACIALEXPRESSION: 0
NEGVOCALIZATION: 0
CONSOLABILITY: 0
NEGVOCALIZATION: 0
FACIALEXPRESSION: 0
BREATHING: 0
CONSOLABILITY: 0
BODYLANGUAGE: 0
NEGVOCALIZATION: 0
BREATHING: 0
BREATHING: 0
FACIALEXPRESSION: 0
TOTALSCORE: 0
BODYLANGUAGE: 0
FACIALEXPRESSION: 0
FACIALEXPRESSION: 0
TOTALSCORE: 0
NEGVOCALIZATION: 0
BODYLANGUAGE: 0
NEGVOCALIZATION: 0
CONSOLABILITY: 0
BREATHING: 0
BODYLANGUAGE: 0
NEGVOCALIZATION: 0
BODYLANGUAGE: 0
FACIALEXPRESSION: 0
CONSOLABILITY: 0
BREATHING: 0
BREATHING: 0
NEGVOCALIZATION: 0
BREATHING: 0
FACIALEXPRESSION: 0
CONSOLABILITY: 0
NEGVOCALIZATION: 0
BODYLANGUAGE: 0
BODYLANGUAGE: 0
TOTALSCORE: 0
BREATHING: 0
TOTALSCORE: 0
BREATHING: 0
CONSOLABILITY: 0
NEGVOCALIZATION: 0
CONSOLABILITY: 0
BREATHING: 0
FACIALEXPRESSION: 0
BREATHING: 0
BREATHING: 0
NEGVOCALIZATION: 0
FACIALEXPRESSION: 0
CONSOLABILITY: 0
BREATHING: 0
NEGVOCALIZATION: 0
CONSOLABILITY: 0
NEGVOCALIZATION: 0
BODYLANGUAGE: 0
BODYLANGUAGE: 0
CONSOLABILITY: 0

## 2020-10-28 NOTE — PROGRESS NOTES
Pt. Morning lab of potassium came back as 3.3. Pt. Does not have any potassium replacement ordered. NP on call notified Mejia Michael. Writer waiting on response.

## 2020-10-28 NOTE — PLAN OF CARE
Problem: Falls - Risk of:  Goal: Will remain free from falls  Description: Will remain free from falls  Outcome: Ongoing   Pt fall risk, fall band present, falling star, safety alarm activated and in use as needed. Hourly rounding performed. Pt encouraged to use call light. See Jackie Singletary fall risk assessment.

## 2020-10-28 NOTE — PROGRESS NOTES
Occupational Therapy  Facility/Department: [facility] PROGRESSIVE CARE  Daily Treatment Note  NAME: Maricarmen Livingston  : 1929  MRN: [id number]    Date of Service: 10/28/2020    Discharge Recommendations:  Subacute/Skilled Nursing Facility       Assessment   Performance deficits / Impairments: Decreased functional mobility ; Decreased ADL status; Decreased strength;Decreased safe awareness;Decreased cognition;Decreased endurance;Decreased balance;Decreased posture  Prognosis: Fair  OT Education: OT Role;Plan of Care;Transfer Training;Energy Conservation;Orientation  REQUIRES OT FOLLOW UP: Yes  Activity Tolerance  Activity Tolerance: Treatment limited secondary to decreased cognition  Safety Devices  Safety Devices in place: Yes  Type of devices: Call light within reach;Nurse notified; Left in bed;Bed alarm in place; Patient at risk for falls;Gait belt         Patient Diagnosis(es): The primary encounter diagnosis was Urinary tract infection without hematuria, site unspecified. Diagnoses of COVID-19 and Swallowed foreign body, initial encounter were also pertinent to this visit.       has a past medical history of Abnormal cardiovascular stress test, Acute confusion, Acute coronary syndrome (HCC), Acute right-sided CHF (congestive heart failure) ([address] Angina pectoris ([address] Arthritis, Atrial fibrillation ([address] CAD (coronary artery disease), Calculus of kidney, CHF (congestive heart failure) ([address] Chronic cystitis, Chronic renal failure, stage 3 (moderate), Dementia ([address] Dyslipidemia, Essential hypertension, GERD (gastroesophageal reflux disease), GERD (gastroesophageal reflux disease), Hx of mitral valve repair, Hypertension, Hypokalemia, Neurogenic bladder, Osteoporosis, Pulmonary hypertension due to mitral valve disease ([address] Renal cyst, left, Renal insufficiency, Right HF (heart failure) secondary to left HF (heart failure) ([address] S/P CABG x 2, Stress incontinence of urine, Unstable angina ([address] Urge incontinence of urine, Urinary incontinence, UTI (urinary tract infection), and Vascular dementia without behavioral disturbance ([facility] [address] has a past surgical history that includes Hysterectomy; Dilation and curettage of uterus; Appendectomy; hernia repair; Eye surgery (Right); Coronary artery bypass graft (N/A, 2003); and Cardiac catheterization (4-). Restrictions  Restrictions/Precautions  Restrictions/Precautions: Up as Tolerated, Fall Risk, Isolation, General Precautions  Required Braces or Orthoses?: No  Position Activity Restriction  Other position/activity restrictions: Droplet Plus Isolation for COVID-19, up with 2 assist, Heels off bed if unable to move LE's, turn/assist Q 2 hrs if unable to turn self  Subjective   General  Chart Reviewed: Yes  Patient assessed for rehabilitation services?: Yes  Additional Pertinent Hx: Dementia  Response to previous treatment: Patient with no complaints from previous session  Family / Caregiver Present: No      Orientation  Orientation  Overall Orientation Status: Impaired  Orientation Level: Oriented to person;Disoriented to time;Disoriented to situation;Disoriented to place  Objective        Balance  Sitting Balance: Contact guard assistance  Standing Balance: Maximum assistance(x2, unable to acheive full stand)    Bed mobility  Rolling to Left: Maximum assistance;2 Person assistance;Dependent/Total  Rolling to Right: Dependent/Total;Maximum assistance;2 Person assistance  Supine to Sit: Dependent/Total;Maximum assistance;2 Person assistance  Sit to Supine: Dependent/Total;Maximum assistance;2 Person assistance  Scooting: Dependent/Total;Maximal assistance;2 Person assistance    Transfers  Sit to stand: 2 Person assistance;Maximum assistance  Stand to sit: 2 Person assistance;Maximum assistance  Transfer Comments: patient unable to follow directions for sit<> yang stedy.  Unable to achieve full stand      Cognition  Overall Cognitive Status: Exceptions  Arousal/Alertness: Inconsistent responses to stimuli  Following Commands: Inconsistently follows commands  Attention Span: Unable to maintain attention; Difficulty attending to directions  Memory: Decreased short term memory;Decreased long term memory;Decreased recall of precautions;Decreased recall of recent events;Decreased recall of biographical Information  Safety Judgement: Decreased awareness of need for assistance;Decreased awareness of need for safety  Problem Solving: Decreased awareness of errors;Assistance required to identify errors made;Assistance required to correct errors made;Assistance required to implement solutions  Insights: Not aware of deficits  Initiation: Requires cues for all  Sequencing: Requires cues for all      Plan   Plan  Times per week: 4-5x/week, 1-2x/day  Current Treatment Recommendations: Strengthening, Functional Mobility Training, Balance Training, Endurance Training, Safety Education & Training, Self-Care / ADL, Equipment Evaluation, Education, & procurement, Patient/Caregiver Education & Training, Positioning, Cognitive Reorientation, Cognitive/Perceptual Training  AM-PAC Score        AM-[facility] Inpatient Daily Activity Raw Score: 10 (10/28/20 1345)  AM-PAC Inpatient ADL T-Scale Score : 27.31 (10/28/20 1345)  ADL Inpatient CMS 0-100% Score: 74.7 (10/28/20 1345)  ADL Inpatient CMS G-Code Modifier : CL (10/28/20 1345)    Goals  Short term goals  Time Frame for Short term goals: by discharge, pt will  Short term goal 1: demo mod A x1 for ADL transfers with approp AD  Short term goal 2: demo min A for self feeding following set up with min cues for initiation/follow through on tasks  Short term goal 3: demo min A with bed mob with min cues for tech  Short term goal 4: demo SBA with sitting balance x 15 min at EOB for inc. ADL completion/prep for transfers  Short term goal 5: participate in AAROM ex for B UEs to inc/maintain strength for transfers/ADLs  Patient Goals Patient goals : not stated       Therapy Time   Individual Concurrent Group Co-treatment   Time In       7752   Time Out       36   Minutes       35     Co-treatment with PT warranted secondary to decreased safety and independence requiring 2 skilled therapy professionals to address individual discipline's goals. OT addressing preparation for ADL transfer, sitting balance for increased ADL performance, sitting/activity tolerance, functional reaching, ability to sequence and follow directions and functional UE strength. Upon writer exit, call light within reach, pt retired to bed. All lines intact and patient positioned comfortably. All patient needs addressed prior to ending therapy session. Chart reviewed prior to treatment and patient is agreeable for therapy. RN reports patient is medically stable for therapy treatment this date.       VAZQUEZ Shaffer/GARRY

## 2020-10-28 NOTE — PROGRESS NOTES
Physical Therapy  Facility/Department: [facility] PROGRESSIVE CARE  Daily Treatment Note  NAME: Simeon Meehan  : 1929  MRN: [id number]    Date of Service: 10/28/2020    Discharge Recommendations:  Subacute/Skilled Nursing Facility        Assessment   Body structures, Functions, Activity limitations: Decreased functional mobility ; Decreased ROM; Decreased strength;Decreased safe awareness;Decreased cognition;Decreased balance;Decreased endurance;Decreased ADL status; Decreased posture  Assessment: Pt with max deficits of bed mobility, transfers, balance, cognition, safety awareness and endurance this session,  & required 2 assist for ALL functional mobility & needed Louvenia Rios for transfers. Pt HIGH risk for falls & recommend SNF at D/C to return to baseline function and a safe D/C back to Asst living. Pt requires continued PT to maximize independence with functional mobility, balance, safety awareness & activity tolerance. Specific instructions for Next Treatment: progress mobility as toleraated  Prognosis: Fair  PT Education: Functional Mobility Training;PT Role;Pressure Relief  Patient Education: Poor carryover with education, not receptive  REQUIRES PT FOLLOW UP: Yes  Activity Tolerance  Activity Tolerance: Patient limited by fatigue;Patient limited by endurance; Patient limited by cognitive status     Patient Diagnosis(es): The primary encounter diagnosis was Urinary tract infection without hematuria, site unspecified. Diagnoses of COVID-19 and Swallowed foreign body, initial encounter were also pertinent to this visit.      has a past medical history of Abnormal cardiovascular stress test, Acute confusion, Acute coronary syndrome (HCC), Acute right-sided CHF (congestive heart failure) ([address] Angina pectoris (HCC), Arthritis, Atrial fibrillation ([address] CAD (coronary artery disease), Calculus of kidney, CHF (congestive heart failure) ([address] Chronic cystitis, Chronic renal failure, stage 3 (moderate), Dementia ([address] Dyslipidemia, Essential hypertension, GERD (gastroesophageal reflux disease), GERD (gastroesophageal reflux disease), Hx of mitral valve repair, Hypertension, Hypokalemia, Neurogenic bladder, Osteoporosis, Pulmonary hypertension due to mitral valve disease ([address] Renal cyst, left, Renal insufficiency, Right HF (heart failure) secondary to left HF (heart failure) ([address] S/P CABG x 2, Stress incontinence of urine, Unstable angina ([address] Urge incontinence of urine, Urinary incontinence, UTI (urinary tract infection), and Vascular dementia without behavioral disturbance ([address] has a past surgical history that includes Hysterectomy; Dilation and curettage of uterus; Appendectomy; hernia repair; Eye surgery (Right); Coronary artery bypass graft (N/A, 2003); and Cardiac catheterization (4-). Restrictions  Restrictions/Precautions  Restrictions/Precautions: Up as Tolerated, Fall Risk, Isolation, General Precautions  Required Braces or Orthoses?: No  Position Activity Restriction  Other position/activity restrictions: Droplet Plus Isolation for COVID-19, up with 2 assist, Heels off bed if unable to move LE's, turn/assist Q 2 hrs if unable to turn self  Subjective   General  Chart Reviewed: Yes  Response To Previous Treatment: Patient with no complaints from previous session. Family / Caregiver Present: No  Subjective  Subjective: Pt unable to give subjective this date. General Comment  Comments: RN okays PT          Orientation  Orientation  Overall Orientation Status: Impaired  Cognition   Cognition  Overall Cognitive Status: Exceptions  Arousal/Alertness: Inconsistent responses to stimuli  Following Commands: Inconsistently follows commands  Attention Span: Unable to maintain attention; Difficulty attending to directions  Memory: Decreased short term memory;Decreased long term memory;Decreased recall of precautions;Decreased recall of recent events;Decreased recall of biographical Information  Safety Judgement: Decreased awareness of need for assistance;Decreased awareness of need for safety  Problem Solving: Decreased awareness of errors;Assistance required to identify errors made;Assistance required to correct errors made;Assistance required to implement solutions  Insights: Not aware of deficits  Initiation: Requires cues for all  Sequencing: Requires cues for all  Objective   Bed mobility  Rolling to Left: Maximum assistance;2 Person assistance;Dependent/Total  Rolling to Right: Dependent/Total;Maximum assistance;2 Person assistance  Supine to Sit: Dependent/Total;Maximum assistance;2 Person assistance  Sit to Supine: Dependent/Total;Maximum assistance;2 Person assistance  Scooting: Dependent/Total;Maximal assistance;2 Person assistance  Comment: Patient is able to complete minimal bedmob without assist, requried increased time and effort to assist with constant cues, directions and hand over hand assistance. Patient inconsistently following commands and requried Max-Dep x 2 A. Transfers  Sit to Stand: Maximum Assistance;2 Person Assistance  Stand to sit: Maximum Assistance;2 Person Assistance  Comment: Attempted use of yang stedy for sit<>stand although pt demo'd posterior lean and resistance against staff, only semi-standing. Unable to get pt fully erect to get safely into yang stedy. Ambulation  Ambulation?: No(Not approprite)     Balance  Posture: Fair  Sitting - Static: Fair;-  Sitting - Dynamic: Poor  Standing - Static: Poor;-  Comments: Unable to perform dynamic sitting balance due to cognitive status. Goals  Short term goals  Time Frame for Short term goals: 12 visits  Short term goal 1: Pt able to complete bed mobility with CGA; Short term goal 2: Pt able to transfer bed to chair with Jean Claude with safest device;   Short term goal 3: Pt to tolerate sitting EOB up to 30 minutes with UB/LB support to improve core stability & repositioning for pressure relief  Short term goal 4: Pt able to tolerate 30 min of activity to include 15-20 reps of ex & functional mobility  to facilitate better activity tolerance; Short term goal 5: Develop stretching/ maintenance program for next care giver to ensure ROM for safe hygiene. Patient Goals   Patient goals : Unable to verbalize    Plan    Plan  Times per week: 1-2x/day. 5x/week. Specific instructions for Next Treatment: progress mobility as toleraated  Current Treatment Recommendations: Strengthening, Home Exercise Program, Neuromuscular Re-education, ROM, Cognitive/Perceptual Training, Safety Education & Training, Patient/Caregiver Education & Training, Endurance Training, Balance Training, Functional Mobility Training, Transfer Training, Gait Training, Positioning  Safety Devices  Type of devices: All fall risk precautions in place, Call light within reach, Bed alarm in place, Left in bed, Gait belt, Nurse notified  Restraints  Initially in place: No     Therapy Time   Individual Concurrent Group Co-treatment   Time In       1049   Time Out       1122   Minutes       35        Co-treatment with OT warranted secondary to decreased safety and independence requiring 2 skilled therapy professionals to address individual discipline's goals. PT addressing pre gait trunk strengthening, weight shifting prior to transfers, transfer training and postural control in sitting.     Jose L Zamudio, PTA

## 2020-10-29 VITALS
OXYGEN SATURATION: 100 % | BODY MASS INDEX: 14.29 KG/M2 | TEMPERATURE: 97.2 F | HEART RATE: 94 BPM | RESPIRATION RATE: 16 BRPM | SYSTOLIC BLOOD PRESSURE: 141 MMHG | HEIGHT: 64 IN | WEIGHT: 83.7 LBS | DIASTOLIC BLOOD PRESSURE: 60 MMHG

## 2020-10-29 PROBLEM — N39.0 URINARY TRACT INFECTION WITHOUT HEMATURIA: Status: RESOLVED | Noted: 2020-10-23 | Resolved: 2020-10-29

## 2020-10-29 LAB
CULTURE: NORMAL
CULTURE: NORMAL
Lab: NORMAL
Lab: NORMAL
SPECIMEN DESCRIPTION: NORMAL
SPECIMEN DESCRIPTION: NORMAL

## 2020-10-29 PROCEDURE — 99222 1ST HOSP IP/OBS MODERATE 55: CPT | Performed by: INTERNAL MEDICINE

## 2020-10-29 PROCEDURE — 6370000000 HC RX 637 (ALT 250 FOR IP): Performed by: INTERNAL MEDICINE

## 2020-10-29 PROCEDURE — 2580000003 HC RX 258: Performed by: NURSE PRACTITIONER

## 2020-10-29 PROCEDURE — 99239 HOSP IP/OBS DSCHRG MGMT >30: CPT | Performed by: INTERNAL MEDICINE

## 2020-10-29 RX ORDER — DILTIAZEM HYDROCHLORIDE 180 MG/1
180 CAPSULE, COATED, EXTENDED RELEASE ORAL DAILY
Qty: 30 CAPSULE | Refills: 0 | DISCHARGE
Start: 2020-10-30

## 2020-10-29 RX ADMIN — Medication 10 ML: at 09:37

## 2020-10-29 ASSESSMENT — PAIN SCALES - PAIN ASSESSMENT IN ADVANCED DEMENTIA (PAINAD)
CONSOLABILITY: 0
CONSOLABILITY: 0
BREATHING: 0
BREATHING: 0
NEGVOCALIZATION: 0
FACIALEXPRESSION: 0
FACIALEXPRESSION: 0
TOTALSCORE: 0
TOTALSCORE: 0
FACIALEXPRESSION: 0
BREATHING: 0
CONSOLABILITY: 0
NEGVOCALIZATION: 0
BODYLANGUAGE: 0
BODYLANGUAGE: 0
NEGVOCALIZATION: 0
BREATHING: 0
FACIALEXPRESSION: 0
BODYLANGUAGE: 0
TOTALSCORE: 0
CONSOLABILITY: 0
BODYLANGUAGE: 0
BODYLANGUAGE: 0
NEGVOCALIZATION: 0
CONSOLABILITY: 0
TOTALSCORE: 0
BODYLANGUAGE: 0
TOTALSCORE: 0
TOTALSCORE: 0
BREATHING: 0
TOTALSCORE: 0
CONSOLABILITY: 0
FACIALEXPRESSION: 0
CONSOLABILITY: 0
NEGVOCALIZATION: 0
FACIALEXPRESSION: 0
FACIALEXPRESSION: 0
CONSOLABILITY: 0
FACIALEXPRESSION: 0
BREATHING: 0
NEGVOCALIZATION: 0
CONSOLABILITY: 0
FACIALEXPRESSION: 0
CONSOLABILITY: 0
NEGVOCALIZATION: 0
BREATHING: 0
CONSOLABILITY: 0
BREATHING: 0
BODYLANGUAGE: 0
NEGVOCALIZATION: 0
CONSOLABILITY: 0
BREATHING: 0
BODYLANGUAGE: 0
BODYLANGUAGE: 0
TOTALSCORE: 0
CONSOLABILITY: 0
NEGVOCALIZATION: 0
TOTALSCORE: 0
BODYLANGUAGE: 0
TOTALSCORE: 0
TOTALSCORE: 0
NEGVOCALIZATION: 0
BODYLANGUAGE: 0
CONSOLABILITY: 0
BREATHING: 0
BODYLANGUAGE: 0
FACIALEXPRESSION: 0
NEGVOCALIZATION: 0
NEGVOCALIZATION: 0
FACIALEXPRESSION: 0
FACIALEXPRESSION: 0
NEGVOCALIZATION: 0
FACIALEXPRESSION: 0
BREATHING: 0
TOTALSCORE: 0
CONSOLABILITY: 0
BODYLANGUAGE: 0
BREATHING: 0
FACIALEXPRESSION: 0
TOTALSCORE: 0
TOTALSCORE: 0
FACIALEXPRESSION: 0
NEGVOCALIZATION: 0
BREATHING: 0
BODYLANGUAGE: 0
TOTALSCORE: 0
BREATHING: 0
BREATHING: 0
NEGVOCALIZATION: 0
BODYLANGUAGE: 0

## 2020-10-29 NOTE — PROGRESS NOTES
[facility]  Office: [address], DO, Marlo Miranda, DO, Matty Jimy, DO, Samuel Landis, DO, Selwyn Yarbrough MD, Tito Barahona MD, Mynor Ragland MD, Teja Bailey MD, Quoc Collins MD, Sanna Jaramillo MD, Eder Romero MD, Norris Francis MD, Ariel Vega MD, Mignon Gold DO, Patricia Shipley MD, Jeaneth Ren MD, Angelika Gutiérrez DO, Divya Myers MD,  Franky Lopez DO, Varinder Flanagan MD, Gale Chambers MD, Gustavo Tran, CNP, [facility], CNP, Sixto Negrete, CNP, Serg Garcia, CNS, Darlin Dang, CNP, Mirlande Lopez, CNP, Rachel Hampton, CNP, Medhat Duffy, CNP, Arthur Orosco, CNP, GENEVIEVE ShettyC, Jaida Cavanaugh, [facility], Rosemary Camacho, CNP, Nury Noguera, CNP, Karan Yanes, CNP, Cassidy Abarca, CNP, Mariela Diaz, CNP         [address]    Progress Note    10/29/2020    11:56 AM    Name:   Nano Jenkins  MRN:     [id number]     [place]:      [de-identified]   Room:   [address] [place] [address] Day:  7  Admit Date:  10/22/2020  6:03 PM    PCP:   Nevin Calles DO  Code Status:  DNR-CC    Subjective:     C/C:   Chief Complaint   Patient presents with    Fever     Interval History Status: not changed. Patient seen and examined this morning. No acute events overnight. Remains pleasantly confused. Not taking medications. Brief History:     80 F presenting for evaluation of fever. Patient is unable to provide information due to mental status, so information was obtained from the Emergency Department note. Patient was brought to the ED via private EMS for further evaluation of fever. She has a history of hypertension, dementia, dyslipidemia, GERD, recurrent UTIs, and previous CABG. she resides at [facility], where an employee tested positive for COVID-19. The facility was concerned for COVID due to her fever, so she was sent to the ED for evaluation. She is not hypoxic or requiring oxygen.  Her COVID screening was positive.  She was noted to be in atrial fibrillation, which she has no history of prior to today. [facility] urinalysis was positive for large leukocytes, moderate bacteria, and trace hemoglobin.  Blood cultures were obtained by the emergency department.  Chest x-ray does not note any acute cardiopulmonary process, but does note a ring overlying the epigastric region. Sandra Olivier is being admitted for further evaluation and management of urinary tract infection. - Very confused at baseline. Review of Systems:     Unable to obtain as patient is a poor historian. Medications: Allergies:     Allergies   Allergen Reactions    Latex Itching    Actonel [Risedronate Sodium] Other (See Comments)     abdo pain      Adhesive Tape Other (See Comments)     band-aid  band-aid    Aminophylline Itching     Other reaction(s): Unknown    Amlodipine     Celebrex [Celecoxib] Itching     Other reaction(s): Unknown  Other reaction(s): Unknown    Chlorthalidone     Ciprofloxacin      Pain muscles      Codeine Nausea Only     Other reaction(s): Unknown  headache    Colesevelam Hcl     Darvocet A500 [Propoxyphene N-Acetaminophen] Nausea Only     headache    Demerol Hcl [Meperidine] Itching     Other reaction(s): Unknown  Flash backs- and did not help pain  Other reaction(s): Unknown  Flash backs- and did not help pain    Lisinopril     Lovastatin     Macrobid [Nitrofurantoin Monohyd Macro] Hives    Metoprolol     Nitrofuran Derivatives     Nitrofurantoin Hives     Other reaction(s): Unknown    Propoxyphene Nausea Only     headache    Ranitidine     Ranitidine Hcl      pain    Simvastatin     Tramadol Hcl      Other reaction(s): Unknown    Tramadol Hcl      Other reaction(s): Unknown    Trimethadione     Trimethoprim     Ultram [Tramadol] Itching     Other reaction(s): Unknown    Vioxx [Rofecoxib]     Naproxen Rash and Itching       rash    Sulfa Antibiotics Rash and Itching     Other reaction(s): Unknown       Current Meds:   Scheduled Meds:    dilTIAZem  180 mg Oral Daily    QUEtiapine  100 mg Oral Daily    QUEtiapine  50 mg Oral BID    apixaban  2.5 mg Oral BID    sodium chloride flush  10 mL Intravenous 2 times per day    melatonin  9 mg Oral Nightly     Continuous Infusions:   PRN Meds: potassium chloride, sodium chloride flush, acetaminophen **OR** acetaminophen, polyethylene glycol, promethazine **OR** ondansetron    Data:     Past Medical History:   has a past medical history of Abnormal cardiovascular stress test, Acute confusion, Acute coronary syndrome ([address] Acute right-sided CHF (congestive heart failure) ([address] Angina pectoris (HCC), Arthritis, Atrial fibrillation ([address] CAD (coronary artery disease), Calculus of kidney, CHF (congestive heart failure) ([address] Chronic cystitis, Chronic renal failure, stage 3 (moderate), Dementia ([address] Dyslipidemia, Essential hypertension, GERD (gastroesophageal reflux disease), GERD (gastroesophageal reflux disease), Hx of mitral valve repair, Hypertension, Hypokalemia, Neurogenic bladder, Osteoporosis, Pulmonary hypertension due to mitral valve disease ([address] Renal cyst, left, Renal insufficiency, Right HF (heart failure) secondary to left HF (heart failure) ([address] S/P CABG x 2, Stress incontinence of urine, Unstable angina ([address] Urge incontinence of urine, Urinary incontinence, UTI (urinary tract infection), and Vascular dementia without behavioral disturbance ([address] Social History:   reports that she has never smoked. She has never used smokeless tobacco. She reports that she does not drink alcohol or use drugs.      Family History:   Family History   Problem Relation Age of Onset    Diabetes Mother     Cancer Father     Anemia Father        Vitals:  BP (!) 135/56   Pulse 90   Temp 97.4 °F (36.3 °C) (Oral)   Resp 16   Ht 5' 4\" (1.626 m)   Wt 83 lb 11.2 oz (38 kg)   SpO2 100%   BMI 14.37 kg/m²   Temp (24hrs), Av.5 °F (35.8 °C), Min:94.1 °F (34.5 °C), Max:97.4 °F (36.3 °C)    No results for input(s): POCGLU in the last 72 hours. I/O (24Hr): Intake/Output Summary (Last 24 hours) at 10/29/2020 1156  Last data filed at 10/28/2020 1838  Gross per 24 hour   Intake 375 ml   Output --   Net 375 ml       Labs:  Hematology:  Recent Labs     10/28/20  0512   WBC 3.7   RBC 4.49   HGB 13.5   HCT 40.9   MCV 91.1   MCH 30.1   MCHC 33.0   RDW 14.1      MPV 10.5     Chemistry:  Recent Labs     10/28/20  0512 10/28/20  2059     --    K 3.3* 4.0     --    CO2 29  --    GLUCOSE 102*  --    BUN 20  --    CREATININE 0.61  --    ANIONGAP 7*  --    LABGLOM >60  --    GFRAA >60  --    CALCIUM 9.5  --    No results for input(s): PROT, LABALBU, LABA1C, F1ULHCK, U6ZFDOB, FT4, TSH, AST, ALT, LDH, GGT, ALKPHOS, LABGGT, BILITOT, BILIDIR, AMMONIA, AMYLASE, LIPASE, LACTATE, CHOL, HDL, LDLCHOLESTEROL, CHOLHDLRATIO, TRIG, VLDL, YIZ61JC, PHENYTOIN, PHENYF, URICACID, POCGLU in the last 72 hours. ABG:  Lab Results   Component Value Date    FIO2 NOT REPORTED 04/03/2016     Lab Results   Component Value Date/Time    SPECIAL LAC 10ML 10/22/2020 07:45 PM     Lab Results   Component Value Date/Time    CULTURE NO GROWTH 6 DAYS 10/22/2020 07:45 PM       Radiology:  [place] Abdomen (kub) (single Ap View)    Result Date: 10/24/2020  Ring foreign body is unchanged likely within the distal esophagus. Xr Abdomen (kub) (single Ap View)    Result Date: 10/23/2020  Ingested ring overlying the lower mediastinum superior to the diaphragm presumably in the distal esophagus. Xr Chest Portable    Result Date: 10/22/2020  No acute cardiopulmonary process     Xr Chest 1 View    Result Date: 10/22/2020  Single lateral view again demonstrates the previously noted ring overlying the epigastric region and appears to be intrinsic, possibly in the stomach. Physical Examination:        An effort to conserve PPE during global pandemic, physical exam was deferred today.   Discussed

## 2020-10-29 NOTE — DISCHARGE INSTR - COC
Continuity of Care Form    Patient Name: Trent Guzman   :  1929  MRN:  [id number]    Admit date:  10/22/2020  Discharge date:  10/29/20    Code Status Order: [facility]   Advance Directives:   [place] Directive Type of Healthcare Directive Copy in [address] Agent's Name Healthcare Agent's Phone Number    10/25/20 [phone]  Yes, patient has an advance directive for healthcare treatment  --  --  --  --  --            Admitting Physician:  Duc Landis DO  PCP: Ezio Dalal DO    Discharging Nurse: [facility] Unit/Room#: 9180/4406-23  Discharging Unit Phone Number: [phone]    Emergency Contact:   Extended Emergency Contact Information  Primary Emergency Contact: Douglas Brown  Home Phone: [phone]  Relation: Child  Secondary Emergency Contact: Nam Genao  Home Phone: [phone]  Relation: Child  Preferred language: English   needed?  No    Past Surgical History:  Past Surgical History:   Procedure Laterality Date    APPENDECTOMY      CARDIAC CATHETERIZATION  2016    CORONARY ARTERY BYPASS GRAFT N/A 2003    x2 with mitro valve repair    DILATION AND CURETTAGE OF UTERUS      EYE SURGERY Right     tear duct    HERNIA REPAIR      HYSTERECTOMY         Immunization History:   Immunization History   Administered Date(s) Administered    Influenza, High Dose (Fluzone 65 yrs and older) 2018, 2019    Influenza, Intradermal, Preservative free 10/25/2013    Influenza, Quadv, IM, PF (6 mo and older Fluzone, Flulaval, Fluarix, and 3 yrs and older Afluria) 10/04/2017    Influenza, Triv, 3 Years and older, IM, PF (Afluria 5yrs and older) 10/19/2016    Pneumococcal Conjugate 13-valent (Danryyt18) 10/04/2017    Pneumococcal Polysaccharide ([id number]) 2015    Zoster Recombinant (Shingrix) 2018, 2019       Active Problems:  Patient Active Problem List   Diagnosis Code    Renal insufficiency N28.9    Urinary incontinence R32    Osteoporosis M81.0    Abnormal cardiovascular stress test R94.39    Pulmonary hypertension due to mitral valve disease ([facility]) I27.22, I05.9    Hx of mitral valve repair Z98.890    S/P CABG x 2 Z95.1    Right HF (heart failure) secondary to left HF (heart failure) ([facility]) I50.814    Acute right-sided CHF (congestive heart failure) ([facility]) I50.811    Acute coronary syndrome ([facility]) I24.9    Drug allergy, multiple Z88.9    Unstable angina ([facility]) I20.0    Essential hypertension I10    Vascular dementia without behavioral disturbance ([facility] F01.50    Dyslipidemia E78.5    Urinary bladder disorder N32.9    History of recurrent UTI (urinary tract infection) Z87.440    Angina pectoris ([facility]) I20.9    SOB (shortness of breath) on exertion R06.02    Dizziness R42    GERD (gastroesophageal reflux disease) K21.9    Chronic renal failure, stage 3 (moderate) N18.30    Weakness R53.1    Hypokalemia E87.6    Acute confusion R41.0    Chronic cystitis N30.20    Hydronephrosis, right N13.30    Neurogenic bladder N31.9    Hypertension I10    Osteoporosis M81.0    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 9XXA    Paroxysmal atrial fibrillation ([facility]) I48.0       Isolation/Infection:   Isolation            Droplet Plus          Patient Infection Status       Infection Onset Added Last Indicated Last Indicated By Review Planned Expiration Resolved Resolved By    COVID-19 10/22/20 10/22/20 10/22/20 COVID-19 10/29/20 11/05/20      Resolved    COVID-19 Rule Out 10/22/20 10/22/20 10/22/20 COVID-19 (Ordered) 10/22/20 Rule-Out Test Resulted            Nurse Assessment:  Last Vital Signs: BP (!) 135/56   Pulse 90   Temp 97.4 °F (36.3 °C) (Oral)   Resp 16   Ht 5' 4\" (1.626 m)   Wt 83 lb 11.2 oz (38 kg)   SpO2 100%   BMI 14.37 kg/m²     Last documented pain score (0-10 scale): Pain Level: 0  Last Weight:   Wt Readings from Last 1 Encounters:   10/28/20 83 lb 11.2 oz (38 kg)     Mental Status:  disoriented    IV Access:  - None    Nursing Mobility/ADLs:  Walking   Dependent  Transfer  Dependent  Bathing  Dependent  Dressing  Dependent  Toileting  Dependent  Feeding  Dependent  Med Admin  Dependent  Med Delivery   whole, crushed and prefers mixed with applesauce (just depends on her mood)    Wound Care Documentation and Therapy:        Elimination:  Continence:   · Bowel: No  · Bladder: No  Urinary Catheter: None   Colostomy/Ileostomy/Ileal Conduit: No       Date of Last BM: 10/27/20    Intake/Output Summary (Last 24 hours) at 10/29/2020 1144  Last data filed at 10/28/2020 1838  Gross per 24 hour   Intake 375 ml   Output --   Net 375 ml     I/O last 3 completed shifts: In: 375 [P.O.:375]  Out: -     Safety Concerns:     History of Falls (last 30 days)    Impairments/Disabilities:      Speech, dementia,     Nutrition Therapy:  Current Nutrition Therapy:   - Oral Diet:  General    Routes of Feeding: Oral  Liquids: No Restrictions  Daily Fluid Restriction: no  Last Modified Barium Swallow with Video (Video Swallowing Test): not done    Treatments at the Time of Hospital Discharge:   Respiratory Treatments: none  Oxygen Therapy:  is not on home oxygen therapy. Ventilator:    - No ventilator support    Rehab Therapies: Physical Therapy and Occupational Therapy  Weight Bearing Status/Restrictions: No weight bearing restirctions  Other Medical Equipment (for information only, NOT a DME order):  walker  Other Treatments:   1.  Skilled nurse assessment    Patient's personal belongings (please select all that are sent with patient):  None    RN SIGNATURE:  Electronically signed by Virgen Preciado RN on 10/29/20 at 11:47 AM EDT    CASE MANAGEMENT/SOCIAL WORK SECTION    Inpatient Status Date: ***    Readmission Risk Assessment Score:  Readmission Risk              Risk of Unplanned Readmission:        14           Discharging to Facility/ Agency   Name: Name: [facility]   Address: [address] [place] [address]. Jose crook, [address]  Phone:  [phone]  · Fax: [phone]  · Address:  · Phone:  · Fax:    Dialysis Facility (if applicable)   · Name:  · Address:  · Dialysis Schedule:  · Phone:  · Fax:    / signature: Electronically signed by LIZY Fisher on 10/29/20 at 1:35 PM EDT    PHYSICIAN SECTION    Prognosis: Fair    Condition at Discharge: Stable    Rehab Potential (if transferring to Rehab): Guarded    Recommended Labs or Other Treatments After Discharge: Weekly BMP    Physician Certification: I certify the above information and transfer of Cheryal Epley  is necessary for the continuing treatment of the diagnosis listed and that she requires [facility] for greater 30 days.      Update Admission H&P: No change in H&P    PHYSICIAN SIGNATURE:  Electronically signed by Serafin Alarcon DO on 10/29/20 at 12:02 PM EDT

## 2020-10-29 NOTE — CARE COORDINATION
Social Work-Patient will be dc today to [facility] by [employer]. Son notified and is agreeable. Discussed IMM letter. He is agreeable with dc. JOHN completed. Nurse to call report to [phone].  Mirian Lacy

## 2020-10-29 NOTE — PLAN OF CARE
Problem: Breathing Pattern - Ineffective  Goal: Ability to achieve and maintain a regular respiratory rate  Outcome: Ongoing     Problem: Body Temperature -  Risk of, Imbalanced  Goal: Ability to maintain a body temperature within defined limits  Outcome: Ongoing     Problem: Body Temperature -  Risk of, Imbalanced  Goal: Complications related to the disease process, condition or treatment will be avoided or minimized  Outcome: Ongoing     Problem: Isolation Precautions - Risk of Spread of Infection  Goal: Prevent transmission of infection  Outcome: Ongoing  Note: Droplet plus isolation, negative pressure room. Providers wearing proper PPE upon entry to room. Will continue POC.

## 2020-10-29 NOTE — DISCHARGE INSTR - DIET
Good nutrition is important when healing from an illness, injury, or surgery. Follow any nutrition recommendations given to you during your hospital stay. If you were given an oral nutrition supplement while in the hospital, continue to take this supplement at home. You can take it with meals, in-between meals, and/or before bedtime. These supplements can be purchased at most local grocery stores, pharmacies, and chain [employer]-stores. If you have any questions about your diet or nutrition, call the hospital and ask for the dietitian.     General diet; supplements with each meal

## 2020-10-29 NOTE — PROGRESS NOTES
Rn tried to administer patient's night medications, however patient refused all of them and RN was only able to flush the patient's line. RN crushed the pills and they are in applesauce so RN will continue to try to encourage patient to comply with med administration.

## 2020-10-29 NOTE — DISCHARGE SUMMARY
[facility]  Office: [phone]  Jihan Payton DO, Pramod Carmona DO, Inez Joyner DO, Barbara Landis DO, Diana Alamo MD, Kristen Summers MD, Timothy Jacobson MD, Shani Bruner MD, Arturo Avalos MD, Fanny Up MD, Hanna Brunson MD, Tino Baez MD, Ariel Francis MD, Cr Workman DO, Clarissa Hager MD, Jorge Ladd MD, Media Sensor, DO, Lori Amaya MD,  Cody Sanchez DO, Aden Wesley MD, Isidro Gonsalves MD, Paola Chery [facility], [facility], CNP, Cain Peterson, CNP, Haven Deng, CNS, Melvin Nur, CNP, Vanesa Pereira, CNP, Don Saenz, CNP, Merlinda Goring, CNP, Mayda Mckeon, CNP, Cem Lindsay PA-C, Matilde Guevara, [facility], Kee Valle, CNP, Sade Acevedo, CNP, Ebenezer Machado, CNP, Wendy Banda, CNP, Yun Edmondson, [facility]    Discharge Summary     Patient ID: Yassine Johnson  :  1929   MRN: [id number]     ACCOUNT:  [de-identified]   Patient's PCP: Molly Francois DO  Admit Date: 10/22/2020   Discharge Date: 10/29/2020     Length of Stay: 7  Code Status:  DNR-CC  Admitting Physician: Vickey Landis DO  Discharge Physician: Ed Luna DO     Active Discharge Diagnoses:     Hospital Problem Lists:  Principal Problem (Resolved):    Urinary tract infection without hematuria  Active Problems:    Drug allergy, multiple    Essential hypertension    Vascular dementia without behavioral disturbance (HCC)    Dyslipidemia    History of recurrent UTI (urinary tract infection)    GERD (gastroesophageal reflux disease)    COVID-19 virus infection    Swallowed foreign body    Paroxysmal atrial fibrillation [facility])      Admission Condition:  poor     Discharged Condition: fair    Hospital Stay:     Hospital Course:  Yassine Johnson is a 80 y.o. female with quite significant underlying dementia who presented to [facility] for evaluation of altered mental status.   Patient has longstanding dementia and lives in a dementia unit. She also has a history of hypertension, dyslipidemia, GERD, recurrent UTIs, previous CABG. She currently resides at [address] where employee tested positive for COVID-19. Patient developed a fever and she was sent to the emergency department for evaluation. She did not require supplemental oxygen. She was found to be positive for COVID-19. She is also discovered to be in atrial fibrillation, which was new. Cardiology was consulted. Patient was started on rate control medications which were adjusted. She often refused her medications. She was started on Eliquis and long-acting Cardizem. Heart rate became well controlled. She remained confused throughout her hospital stay. Family was kept up-to-date. Ultimately, she is appropriate to skilled nursing facility prior to returning to [facility] as she is quite debilitated from COVID-19 virus. Of note, the patient is a DNR CC.     Significant therapeutic interventions:   - Rate control with Cardizem  - Eliquis initiation for [facility]    Significant Diagnostic Studies:   Labs / Micro:  CBC:   Lab Results   Component Value Date    WBC 3.7 10/28/2020    RBC 4.49 10/28/2020    HGB 13.5 10/28/2020    HCT 40.9 10/28/2020    MCV 91.1 10/28/2020    MCH 30.1 10/28/2020    MCHC 33.0 10/28/2020    RDW 14.1 10/28/2020     10/28/2020     CMP:    Lab Results   Component Value Date    GLUCOSE 102 10/28/2020    GLUCOSE 108 03/02/2012     10/28/2020    K 4.0 10/28/2020     10/28/2020    CO2 29 10/28/2020    BUN 20 10/28/2020    CREATININE 0.61 10/28/2020    ANIONGAP 7 10/28/2020    ALKPHOS 78 10/24/2020    ALT 10 10/24/2020    AST 16 10/24/2020    BILITOT 0.39 10/24/2020    LABALBU 3.8 10/24/2020    LABALBU 4.6 03/02/2012    ALBUMIN NOT REPORTED 10/24/2020    LABGLOM >60 10/28/2020    GFRAA >60 10/28/2020    GFR      10/28/2020    GFR NOT REPORTED 10/28/2020    PROT 6.5 10/24/2020    CALCIUM 9.5 10/28/2020       Radiology:  Xr Abdomen (kub) (single Ap View)    Result Date: 10/24/2020  Ring foreign body is unchanged likely within the distal esophagus. Xr Abdomen (kub) (single Ap View)    Result Date: 10/23/2020  Ingested ring overlying the lower mediastinum superior to the diaphragm presumably in the distal esophagus. Xr Chest Portable    Result Date: 10/22/2020  No acute cardiopulmonary process     Xr Chest 1 View    Result Date: 10/22/2020  Single lateral view again demonstrates the previously noted ring overlying the epigastric region and appears to be intrinsic, possibly in the stomach. Consultations:    Consults:     Final Specialist Recommendations/Findings:   IP CONSULT TO CARDIOLOGY  IP CONSULT TO GI      The patient was seen and examined on day of discharge and this discharge summary is in conjunction with any daily progress note from day of discharge. Discharge plan:     Disposition: [facility]    Physician Follow Up:     Migel Booker MD  [address] [facility], [address]  [phone]    Schedule an appointment as soon as possible for a visit in 1 week  As needed    DO [facility]   [address]    Schedule an appointment as soon as possible for a visit in 2 weeks         Requiring Further Evaluation/Follow Up POST HOSPITALIZATION/Incidental Findings: None    Diet: regular diet    Activity: As tolerated with assistance    Instructions to Patient: None    Discharge Medications:      Medication List      START taking these medications    apixaban 2.5 MG Tabs tablet  Commonly known as:  ELIQUIS  Take 1 tablet by mouth 2 times daily     dilTIAZem 180 MG extended release capsule  Commonly known as:  CARDIZEM CD  Take 1 capsule by mouth daily  Start taking on:  October 30, 2020        CONTINUE taking these medications    acetaminophen 160 MG/5ML solution  Commonly known as:  TYLENOL     HYDROcodone-acetaminophen 5-325 MG per

## 2020-10-29 NOTE — PROGRESS NOTES
RN tried to motivate patient to take her meds and she continues to be non-compliant. She becomes aggressive when RN tries to initiate VS, and brief changes. She mumbles and is not communicating clearly or appropriately. RN will continue to encourage and try to motivate compliance.

## 2020-11-03 NOTE — CONSULTS
PRE CONSULT ROUNDING NOTE  HPI  80year old female with pmh of dementia afib GERD recurrent UTI's dyslipidemia, CHF  who presented to the ED for fever. She was exposed to a person who was covid positive. She is being treated for a UTI and she is covid 19 positive. Our service was consulted for a foreign body in the distal esophagus seen on CXR. Pt was not able to give any history in the ED due to her dementia. Per the RN she remains confused but has not had abdominal pain nausea emesis melena or hematemesis. The CXR showed no acute findings other than the foreign body. KUB showed ingested ring likely in the distal esophagus. Labs are unremarkable.      Endoscopy unknown  Family reports unknown  Social no smoking no etoh or illicit drugs   /18   Pulse 94   Temp 98.2 °F (36.8 °C) (Axillary)   Resp 18   Wt 86 lb 11.2 oz (39.3 kg)   SpO2 90%   BMI 14.88 kg/m²      ROS unable to be obtained due to dementia   meds labs imaging and past medical records were reviewed    Exam MD notes reviewed and care was d/w primary RN  . Caprice Somers Due to the current efforts to prevent transmission of COVID-19 and also the need to preserve PPE for other caregivers, a face-to-face encounter with the patient was not performed. That being said, all relevant records and diagnostic tests were reviewed, including laboratory results and imaging. Please reference any relevant documentation elsewhere. Care will be coordinated with the primary service. Assessment  Foreign body (ring) in distal esophagus  covid 19 infection  UTI  dementia    Plan  D/w dr Johnnie Murphy, she does not have any abdominal pain or nausea will defer egd at this time, due to her covid 19 infection- she likely would need to be intubated for the procedure and at increased risk for respiratory complications due to the covid 19. Will recheck kub in am and reassess   antibiotics for uti per primary service  covid 19 mgt per primary/ID   Formal gi consult to follow  . Caprice Craig Mary Nolan, APRN - CNP        Attending Physician Statement  I have discussed the care of Rosa Riggs and   I have examined the patient myselft independently, and taken ros and hpi , including pertinent history and exam findings,  with the author of this note . I have reviewed the key elements of all parts of the encounter with the nurse practitioner/resident. I agree with the assessment, plan and orders as documented by the above health care provider         Patient positive COVID-19  We were planning to do EGD and remove the ring  After discussion with anesthesia  Specially with the patient ate breakfast  And Covid positivity needs to be intubated  Felt to be very risky to intubate this patient with fever and COVID-19 at this time  The risk from that was felt to be higher  Than just watching the ring goes down as it is not a sharp object  We can follow this patient with KUBs  If she passed the 2 weeks and the ring still in the stomach then we can plan EGD and remove it  Case signed to Dr Deepa Garcia his covering tonight  Electronically signed by Jakub Maher MD              ED to Hosp-Admission (Discharged) on 10/22/2020          Revision History          Detailed Report         Note shared with patient   Plan of Care Info     Author  Note Status  Last Update User    Jakub Maher MD  Addendum  Jakub Maher MD    Last Update Date/Time: 10/23/2020 11:36 PM    Chart Review Routing History     No routing history on file.

## 2022-01-01 NOTE — PROGRESS NOTES
Medicare Annual Wellness Visit - Subsequent    Name: Audrey Singh Date: 10/4/2017   MRN: [id number] Sex: Female   Age: 80 y.o. Ethnicity: Non-/Non    : 1929 Race: Loretta Tabares is here for   Chief Complaint   Patient presents with    Hypertension        Screenings for behavioral, psychosocial and functional/safety risks, and cognitive dysfunction are all negative except as indicated below. These results, as well as other patient data from the [address] [employer] [address] [facility], are documented in Flowsheets linked to this Encounter.     Allergies   Allergen Reactions    Latex Itching    Actonel [Risedronate Sodium] Other (See Comments)     abdo pain      Adhesive Tape Other (See Comments)     band-aid  band-aid    Aminophylline Itching     Other reaction(s): Unknown    Amlodipine     Celebrex [Celecoxib] Itching     Other reaction(s): Unknown  Other reaction(s): Unknown    Chlorthalidone     Ciprofloxacin      Pain muscles      Codeine Nausea Only     Other reaction(s): Unknown  headache    Colesevelam Hcl     Darvocet A500 [Propoxyphene N-Acetaminophen] Nausea Only     headache    Demerol Hcl [Meperidine] Itching     Other reaction(s): Unknown  Flash backs- and did not help pain  Other reaction(s): Unknown  Flash backs- and did not help pain    Lisinopril     Lovastatin     Macrobid [Nitrofurantoin Monohyd Macro] Hives    Metoprolol     Nitrofuran Derivatives     Nitrofurantoin Hives     Other reaction(s): Unknown    Propoxyphene Nausea Only     headache    Ranitidine     Ranitidine Hcl      pain    Simvastatin     Tramadol Hcl      Other reaction(s): Unknown    Tramadol Hcl      Other reaction(s): Unknown    Trimethadione     Trimethoprim     Ultram [Tramadol] Itching     Other reaction(s): Unknown    Vioxx [Rofecoxib]     Naproxen Rash and Itching       rash    Sulfa Antibiotics Rash and Itching     Other reaction(s): Unknown Prior to Visit Medications    Medication Sig Taking?  Authorizing Provider   chlorthalidone (HYGROTON) 25 MG tablet 1/2 a tab twice weekly on Thursday and Sunday mornings Yes Florinda Ferrell MD   hydrALAZINE (APRESOLINE) 25 MG tablet TAKE 1 TABLET BY MOUTH EVERY 8 HOURS Yes Florinda Ferrell MD   darifenacin (ENABLEX) 7.5 MG extended release tablet TAKE 1 TABLET BY MOUTH EVERY DAY Yes Historical Provider, MD   donepezil (ARICEPT) 10 MG tablet TAKE 1 TABLET BY MOUTH NIGHTLY Yes Florinda Ferrell MD   memantine (NAMENDA) 10 MG tablet Take 1 tablet by mouth 2 times daily Yes Florinda Ferrell MD   Lactobacillus (ACIDOPHILUS) CAPS capsule Take 2 capsules by mouth daily Yes Florinda Ferrell MD   vitamin D3 (CHOLECALCIFEROL) 400 UNITS TABS tablet Take 400 Units by mouth daily Yes Historical Provider, MD   nitroGLYCERIN (NITROSTAT) 0.4 MG SL tablet Place 1 tablet under the tongue as needed for Chest pain Yes Yoshi Valdez MD   ezetimibe (ZETIA) 10 MG tablet Take 1 tablet by mouth daily Yes Yoshi Valdez MD   Multiple Vitamins-Minerals (CENTRUM SILVER) TABS Take 1 tablet by mouth daily Yes Yoshi Valdez MD   omeprazole (PRILOSEC) 20 MG capsule Take 1 capsule by mouth 2 times daily  Patient taking differently: Take 20 mg by mouth Daily  Yes Yoshi Valdez MD   aspirin 81 MG tablet Take 1 tablet by mouth daily Yes Yoshi Valdez MD   calcium carbonate 600 MG TABS tablet Take 1 tablet by mouth daily  Yes Historical Provider, MD   latanoprost (XALATAN) 0.005 % ophthalmic solution Place 1 drop into both eyes nightly  Yes Historical Provider, MD   ranolazine (RANEXA) 500 MG extended release tablet Take 500 mg by mouth daily  Historical Provider, MD       Past Medical History:   Diagnosis Date    Arthritis     CAD (coronary artery disease)     CHF (congestive heart failure) (HCC)     Dementia     Dyslipidemia     GERD (gastroesophageal reflux disease)     Hypertension     Osteoporosis     Renal tenderness  Neurologic: reflexes normal and symmetric, no cranial nerve deficit, gait, coordination and speech normal  Gait slow- walks without cane, occasionally uses cane. The following problems were reviewed today and where indicated follow up appointments were made and/or referrals ordered. Risk Factor Screenings with Interventions     Fall Risk:     Fall Risk Interventions:    · Home safety tips provided    Depression:     Depression Interventions:  · None indicated    Anxiety:     Anxiety Interventions:  · None indicated    Cognitive:     Cognitive Impairment Interventions:  · Medication adjusted- stable on current meds    Substance Abuse:  Social History     Social History Main Topics    Smoking status: Never Smoker    Smokeless tobacco: Never Used    Alcohol use No      Comment: very little    Drug use: No    Sexual activity: Not Currently        Substance Abuse Interventions:  · None indicated    Health Risk Assessment:        General Health Risk Interventions:  · None indicated       Body mass index is 29.78 kg/(m^2). Health Habits/Nutrition Interventions:  · Inadequate physical activity:  patient agrees to increase physical activity as follows: increased walking around home and yard as much as she can       Hearing/Vision Interventions:  · None indicated     Hearing - wears aids sometimes but not always, last seen hearing aid specialist 5-6 weeks ago.had it checked before the warranty ran out. Appt for eye exam coming up in a month.   Safety Interventions:  · Home safety tips provided       ADL Interventions:  ·  helps with dressing up but most other ADL- able to do by self    Personalized Preventive Plan   Current Health Maintenance Status  Immunization History   Administered Date(s) Administered    Influenza, Intradermal, Preservative free 10/25/2013    Influenza, Trivalent Vaccine 3 Years and above, IM, PF 10/19/2016    Pneumococcal Polysaccharide ([id number]) 04/27/2015 Health Maintenance   Topic Date Due    Flu vaccine (1) 09/01/2017    DTaP/Tdap/Td vaccine (1 - Tdap) 11/30/2017 (Originally 1/12/1948)    Pneumococcal low/med risk (2 of 2 - PCV13) 11/30/2017 (Originally 4/27/2016)    Zostavax vaccine  Addressed       Recommendations for Preventive Services Due: see orders.   Recommended screening schedule for the next 5-10 years is provided to the patient in written form: see Patient Instructions/AVS. 52

## 2023-03-02 NOTE — ADDENDUM NOTE
Addended by: Oamr Amador on: 12/12/2018 02:29 PM     Modules accepted: Orders Nurses Note -- 4 Eyes      3/2/2023   4:34 PM      Skin assessed during: Q Shift Change      [x] No Pressure Injuries Present    []Prevention Measures Documented      [x] Yes- Altered Skin Integrity Present or Discovered   [] LDA Added if Not in Epic (Describe Wound)   [x] New Altered Skin Integrity was Present on Admit and Documented in LDA   [] Wound Image Taken    Wound Care Consulted? No    Attending Nurse:  ABBIE SOSA RN     Second RN/Staff Member:  MEI Gutierrez LPN